# Patient Record
Sex: FEMALE | Race: WHITE | NOT HISPANIC OR LATINO | Employment: UNEMPLOYED | URBAN - METROPOLITAN AREA
[De-identification: names, ages, dates, MRNs, and addresses within clinical notes are randomized per-mention and may not be internally consistent; named-entity substitution may affect disease eponyms.]

---

## 2020-12-22 ENCOUNTER — APPOINTMENT (OUTPATIENT)
Dept: GENERAL RADIOLOGY | Facility: HOSPITAL | Age: 45
End: 2020-12-22

## 2020-12-22 ENCOUNTER — HOSPITAL ENCOUNTER (INPATIENT)
Facility: HOSPITAL | Age: 45
LOS: 7 days | Discharge: HOME | End: 2020-12-29
Attending: EMERGENCY MEDICINE | Admitting: HOSPITALIST

## 2020-12-22 DIAGNOSIS — F19.10 IV DRUG ABUSE (HCC): ICD-10-CM

## 2020-12-22 DIAGNOSIS — A41.9 SEVERE SEPSIS (HCC): Primary | ICD-10-CM

## 2020-12-22 DIAGNOSIS — R65.20 SEVERE SEPSIS (HCC): Primary | ICD-10-CM

## 2020-12-22 DIAGNOSIS — N39.0 URINARY TRACT INFECTION WITHOUT HEMATURIA, SITE UNSPECIFIED: ICD-10-CM

## 2020-12-22 DIAGNOSIS — E87.1 HYPONATREMIA: ICD-10-CM

## 2020-12-22 DIAGNOSIS — M00.9 SEPTIC ARTHRITIS OF AC JOINT (HCC): ICD-10-CM

## 2020-12-22 DIAGNOSIS — R73.9 HYPERGLYCEMIA: ICD-10-CM

## 2020-12-22 PROBLEM — F32.A DEPRESSION: Status: ACTIVE | Noted: 2020-12-22

## 2020-12-22 PROBLEM — M54.12 CERVICAL RADICULOPATHY: Status: ACTIVE | Noted: 2020-12-22

## 2020-12-22 PROBLEM — E11.9 TYPE 2 DIABETES MELLITUS WITHOUT COMPLICATIONS (HCC): Chronic | Status: ACTIVE | Noted: 2020-12-22

## 2020-12-22 PROBLEM — F41.9 ANXIETY: Status: ACTIVE | Noted: 2020-12-22

## 2020-12-22 PROBLEM — I10 HYPERTENSION: Status: ACTIVE | Noted: 2020-12-22

## 2020-12-22 PROBLEM — E11.9 TYPE 2 DIABETES MELLITUS WITHOUT COMPLICATIONS (HCC): Status: ACTIVE | Noted: 2020-12-22

## 2020-12-22 LAB
ALBUMIN SERPL-MCNC: 3.4 G/DL (ref 3.5–5.2)
ALBUMIN/GLOB SERPL: 0.8 G/DL
ALP SERPL-CCNC: 118 U/L (ref 39–117)
ALT SERPL W P-5'-P-CCNC: 21 U/L (ref 1–33)
AMPHET+METHAMPHET UR QL: POSITIVE
ANION GAP SERPL CALCULATED.3IONS-SCNC: 12 MMOL/L (ref 5–15)
AST SERPL-CCNC: 18 U/L (ref 1–32)
BACTERIA UR QL AUTO: ABNORMAL /HPF
BARBITURATES UR QL SCN: NEGATIVE
BASOPHILS # BLD AUTO: 0 10*3/MM3 (ref 0–0.2)
BASOPHILS NFR BLD AUTO: 0.2 % (ref 0–1.5)
BENZODIAZ UR QL SCN: NEGATIVE
BILIRUB SERPL-MCNC: 0.9 MG/DL (ref 0–1.2)
BILIRUB UR QL STRIP: NEGATIVE
BUN SERPL-MCNC: 17 MG/DL (ref 6–20)
BUN/CREAT SERPL: 18.7 (ref 7–25)
CALCIUM SPEC-SCNC: 9.1 MG/DL (ref 8.6–10.5)
CANNABINOIDS SERPL QL: NEGATIVE
CHLORIDE SERPL-SCNC: 89 MMOL/L (ref 98–107)
CLARITY UR: CLEAR
CO2 SERPL-SCNC: 23 MMOL/L (ref 22–29)
COCAINE UR QL: NEGATIVE
COLOR UR: YELLOW
CREAT SERPL-MCNC: 0.91 MG/DL (ref 0.57–1)
CRP SERPL-MCNC: 21.24 MG/DL (ref 0–0.5)
D-LACTATE SERPL-SCNC: 2.4 MMOL/L (ref 0.5–2)
D-LACTATE SERPL-SCNC: 2.5 MMOL/L (ref 0.5–2)
DEPRECATED RDW RBC AUTO: 55.6 FL (ref 37–54)
EOSINOPHIL # BLD AUTO: 0 10*3/MM3 (ref 0–0.4)
EOSINOPHIL NFR BLD AUTO: 0 % (ref 0.3–6.2)
ERYTHROCYTE [DISTWIDTH] IN BLOOD BY AUTOMATED COUNT: 17 % (ref 12.3–15.4)
ERYTHROCYTE [SEDIMENTATION RATE] IN BLOOD: 59 MM/HR (ref 0–20)
GFR SERPL CREATININE-BSD FRML MDRD: 67 ML/MIN/1.73
GLOBULIN UR ELPH-MCNC: 4.1 GM/DL
GLUCOSE SERPL-MCNC: 314 MG/DL (ref 65–99)
GLUCOSE UR STRIP-MCNC: ABNORMAL MG/DL
HCT VFR BLD AUTO: 47.5 % (ref 34–46.6)
HGB BLD-MCNC: 15.9 G/DL (ref 12–15.9)
HGB UR QL STRIP.AUTO: ABNORMAL
HOLD SPECIMEN: NORMAL
HOLD SPECIMEN: NORMAL
HYALINE CASTS UR QL AUTO: ABNORMAL /LPF
KETONES UR QL STRIP: NEGATIVE
LACTATE HOLD SPECIMEN: NORMAL
LEUKOCYTE ESTERASE UR QL STRIP.AUTO: NEGATIVE
LYMPHOCYTES # BLD AUTO: 1 10*3/MM3 (ref 0.7–3.1)
LYMPHOCYTES NFR BLD AUTO: 7.9 % (ref 19.6–45.3)
MCH RBC QN AUTO: 31.5 PG (ref 26.6–33)
MCHC RBC AUTO-ENTMCNC: 33.4 G/DL (ref 31.5–35.7)
MCV RBC AUTO: 94.3 FL (ref 79–97)
METHADONE UR QL SCN: NEGATIVE
MONOCYTES # BLD AUTO: 0.4 10*3/MM3 (ref 0.1–0.9)
MONOCYTES NFR BLD AUTO: 3.4 % (ref 5–12)
NEUTROPHILS NFR BLD AUTO: 11 10*3/MM3 (ref 1.7–7)
NEUTROPHILS NFR BLD AUTO: 88.5 % (ref 42.7–76)
NITRITE UR QL STRIP: NEGATIVE
NRBC BLD AUTO-RTO: 0.1 /100 WBC (ref 0–0.2)
OPIATES UR QL: POSITIVE
OXYCODONE UR QL SCN: NEGATIVE
PH UR STRIP.AUTO: 6 [PH] (ref 5–8)
PLATELET # BLD AUTO: 187 10*3/MM3 (ref 140–450)
PMV BLD AUTO: 7.5 FL (ref 6–12)
POTASSIUM SERPL-SCNC: 3.9 MMOL/L (ref 3.5–5.2)
PROCALCITONIN SERPL-MCNC: 2.81 NG/ML (ref 0–0.25)
PROT SERPL-MCNC: 7.5 G/DL (ref 6–8.5)
PROT UR QL STRIP: ABNORMAL
RBC # BLD AUTO: 5.03 10*6/MM3 (ref 3.77–5.28)
RBC # UR: ABNORMAL /HPF
REF LAB TEST METHOD: ABNORMAL
SARS-COV-2 RNA PNL SPEC NAA+PROBE: NORMAL
SODIUM SERPL-SCNC: 124 MMOL/L (ref 136–145)
SP GR UR STRIP: 1.03 (ref 1–1.03)
SQUAMOUS #/AREA URNS HPF: ABNORMAL /HPF
UROBILINOGEN UR QL STRIP: ABNORMAL
WBC # BLD AUTO: 12.4 10*3/MM3 (ref 3.4–10.8)
WBC UR QL AUTO: ABNORMAL /HPF
WHOLE BLOOD HOLD SPECIMEN: NORMAL
WHOLE BLOOD HOLD SPECIMEN: NORMAL

## 2020-12-22 PROCEDURE — 87147 CULTURE TYPE IMMUNOLOGIC: CPT | Performed by: EMERGENCY MEDICINE

## 2020-12-22 PROCEDURE — 80307 DRUG TEST PRSMV CHEM ANLYZR: CPT | Performed by: EMERGENCY MEDICINE

## 2020-12-22 PROCEDURE — 83036 HEMOGLOBIN GLYCOSYLATED A1C: CPT | Performed by: EMERGENCY MEDICINE

## 2020-12-22 PROCEDURE — 87086 URINE CULTURE/COLONY COUNT: CPT | Performed by: EMERGENCY MEDICINE

## 2020-12-22 PROCEDURE — 93005 ELECTROCARDIOGRAM TRACING: CPT | Performed by: EMERGENCY MEDICINE

## 2020-12-22 PROCEDURE — 83605 ASSAY OF LACTIC ACID: CPT

## 2020-12-22 PROCEDURE — 71045 X-RAY EXAM CHEST 1 VIEW: CPT

## 2020-12-22 PROCEDURE — 80053 COMPREHEN METABOLIC PANEL: CPT | Performed by: EMERGENCY MEDICINE

## 2020-12-22 PROCEDURE — 25010000002 CEFTRIAXONE PER 250 MG: Performed by: EMERGENCY MEDICINE

## 2020-12-22 PROCEDURE — 25010000002 KETOROLAC TROMETHAMINE PER 15 MG: Performed by: EMERGENCY MEDICINE

## 2020-12-22 PROCEDURE — 87150 DNA/RNA AMPLIFIED PROBE: CPT | Performed by: EMERGENCY MEDICINE

## 2020-12-22 PROCEDURE — 84145 PROCALCITONIN (PCT): CPT | Performed by: EMERGENCY MEDICINE

## 2020-12-22 PROCEDURE — 85651 RBC SED RATE NONAUTOMATED: CPT | Performed by: EMERGENCY MEDICINE

## 2020-12-22 PROCEDURE — 87040 BLOOD CULTURE FOR BACTERIA: CPT | Performed by: EMERGENCY MEDICINE

## 2020-12-22 PROCEDURE — 87186 SC STD MICRODIL/AGAR DIL: CPT | Performed by: EMERGENCY MEDICINE

## 2020-12-22 PROCEDURE — 99222 1ST HOSP IP/OBS MODERATE 55: CPT | Performed by: STUDENT IN AN ORGANIZED HEALTH CARE EDUCATION/TRAINING PROGRAM

## 2020-12-22 PROCEDURE — 99284 EMERGENCY DEPT VISIT MOD MDM: CPT

## 2020-12-22 PROCEDURE — 25010000002 ENOXAPARIN PER 10 MG: Performed by: STUDENT IN AN ORGANIZED HEALTH CARE EDUCATION/TRAINING PROGRAM

## 2020-12-22 PROCEDURE — 73030 X-RAY EXAM OF SHOULDER: CPT

## 2020-12-22 PROCEDURE — 86140 C-REACTIVE PROTEIN: CPT | Performed by: EMERGENCY MEDICINE

## 2020-12-22 PROCEDURE — 25010000002 VANCOMYCIN 10 G RECONSTITUTED SOLUTION: Performed by: EMERGENCY MEDICINE

## 2020-12-22 PROCEDURE — 85025 COMPLETE CBC W/AUTO DIFF WBC: CPT | Performed by: EMERGENCY MEDICINE

## 2020-12-22 PROCEDURE — 81001 URINALYSIS AUTO W/SCOPE: CPT | Performed by: EMERGENCY MEDICINE

## 2020-12-22 PROCEDURE — 87635 SARS-COV-2 COVID-19 AMP PRB: CPT | Performed by: EMERGENCY MEDICINE

## 2020-12-22 RX ORDER — SODIUM CHLORIDE 0.9 % (FLUSH) 0.9 %
10 SYRINGE (ML) INJECTION EVERY 12 HOURS SCHEDULED
Status: DISCONTINUED | OUTPATIENT
Start: 2020-12-22 | End: 2020-12-29 | Stop reason: HOSPADM

## 2020-12-22 RX ORDER — POTASSIUM CHLORIDE 1.5 G/1.77G
40 POWDER, FOR SOLUTION ORAL AS NEEDED
Status: DISCONTINUED | OUTPATIENT
Start: 2020-12-22 | End: 2020-12-29 | Stop reason: HOSPADM

## 2020-12-22 RX ORDER — NICOTINE POLACRILEX 4 MG
15 LOZENGE BUCCAL
Status: DISCONTINUED | OUTPATIENT
Start: 2020-12-22 | End: 2020-12-29 | Stop reason: HOSPADM

## 2020-12-22 RX ORDER — NITROGLYCERIN 0.4 MG/1
0.4 TABLET SUBLINGUAL
Status: DISCONTINUED | OUTPATIENT
Start: 2020-12-22 | End: 2020-12-29 | Stop reason: HOSPADM

## 2020-12-22 RX ORDER — SODIUM CHLORIDE, SODIUM LACTATE, POTASSIUM CHLORIDE, CALCIUM CHLORIDE 600; 310; 30; 20 MG/100ML; MG/100ML; MG/100ML; MG/100ML
125 INJECTION, SOLUTION INTRAVENOUS CONTINUOUS
Status: DISCONTINUED | OUTPATIENT
Start: 2020-12-22 | End: 2020-12-22

## 2020-12-22 RX ORDER — INSULIN LISPRO 100 [IU]/ML
0-9 INJECTION, SOLUTION INTRAVENOUS; SUBCUTANEOUS AS NEEDED
Status: DISCONTINUED | OUTPATIENT
Start: 2020-12-22 | End: 2020-12-29 | Stop reason: HOSPADM

## 2020-12-22 RX ORDER — ACETAMINOPHEN 325 MG/1
650 TABLET ORAL EVERY 4 HOURS PRN
Status: DISCONTINUED | OUTPATIENT
Start: 2020-12-22 | End: 2020-12-29 | Stop reason: HOSPADM

## 2020-12-22 RX ORDER — VANCOMYCIN 1.75 GRAM/500 ML IN 0.9 % SODIUM CHLORIDE INTRAVENOUS
20 ONCE
Status: COMPLETED | OUTPATIENT
Start: 2020-12-22 | End: 2020-12-22

## 2020-12-22 RX ORDER — INSULIN LISPRO 100 [IU]/ML
0-9 INJECTION, SOLUTION INTRAVENOUS; SUBCUTANEOUS
Status: DISCONTINUED | OUTPATIENT
Start: 2020-12-23 | End: 2020-12-29 | Stop reason: HOSPADM

## 2020-12-22 RX ORDER — ONDANSETRON 2 MG/ML
4 INJECTION INTRAMUSCULAR; INTRAVENOUS EVERY 6 HOURS PRN
Status: DISCONTINUED | OUTPATIENT
Start: 2020-12-22 | End: 2020-12-29 | Stop reason: HOSPADM

## 2020-12-22 RX ORDER — KETOROLAC TROMETHAMINE 15 MG/ML
15 INJECTION, SOLUTION INTRAMUSCULAR; INTRAVENOUS ONCE
Status: COMPLETED | OUTPATIENT
Start: 2020-12-22 | End: 2020-12-22

## 2020-12-22 RX ORDER — ACETAMINOPHEN 160 MG/5ML
650 SOLUTION ORAL EVERY 4 HOURS PRN
Status: DISCONTINUED | OUTPATIENT
Start: 2020-12-22 | End: 2020-12-29 | Stop reason: HOSPADM

## 2020-12-22 RX ORDER — POTASSIUM CHLORIDE 7.45 MG/ML
10 INJECTION INTRAVENOUS
Status: DISCONTINUED | OUTPATIENT
Start: 2020-12-22 | End: 2020-12-29 | Stop reason: HOSPADM

## 2020-12-22 RX ORDER — ACETAMINOPHEN 650 MG/1
650 SUPPOSITORY RECTAL EVERY 4 HOURS PRN
Status: DISCONTINUED | OUTPATIENT
Start: 2020-12-22 | End: 2020-12-29 | Stop reason: HOSPADM

## 2020-12-22 RX ORDER — POTASSIUM CHLORIDE 20 MEQ/1
40 TABLET, EXTENDED RELEASE ORAL AS NEEDED
Status: DISCONTINUED | OUTPATIENT
Start: 2020-12-22 | End: 2020-12-29 | Stop reason: HOSPADM

## 2020-12-22 RX ORDER — MAGNESIUM SULFATE HEPTAHYDRATE 40 MG/ML
2 INJECTION, SOLUTION INTRAVENOUS AS NEEDED
Status: DISCONTINUED | OUTPATIENT
Start: 2020-12-22 | End: 2020-12-29 | Stop reason: HOSPADM

## 2020-12-22 RX ORDER — SODIUM CHLORIDE 0.9 % (FLUSH) 0.9 %
10 SYRINGE (ML) INJECTION AS NEEDED
Status: DISCONTINUED | OUTPATIENT
Start: 2020-12-22 | End: 2020-12-29 | Stop reason: HOSPADM

## 2020-12-22 RX ORDER — DEXTROSE MONOHYDRATE 25 G/50ML
25 INJECTION, SOLUTION INTRAVENOUS
Status: DISCONTINUED | OUTPATIENT
Start: 2020-12-22 | End: 2020-12-29 | Stop reason: HOSPADM

## 2020-12-22 RX ORDER — MAGNESIUM SULFATE 1 G/100ML
1 INJECTION INTRAVENOUS AS NEEDED
Status: DISCONTINUED | OUTPATIENT
Start: 2020-12-22 | End: 2020-12-29 | Stop reason: HOSPADM

## 2020-12-22 RX ORDER — ONDANSETRON 4 MG/1
4 TABLET, FILM COATED ORAL EVERY 6 HOURS PRN
Status: DISCONTINUED | OUTPATIENT
Start: 2020-12-22 | End: 2020-12-29 | Stop reason: HOSPADM

## 2020-12-22 RX ORDER — SODIUM CHLORIDE 9 MG/ML
75 INJECTION, SOLUTION INTRAVENOUS CONTINUOUS
Status: DISCONTINUED | OUTPATIENT
Start: 2020-12-22 | End: 2020-12-27

## 2020-12-22 RX ORDER — CHOLECALCIFEROL (VITAMIN D3) 125 MCG
5 CAPSULE ORAL NIGHTLY PRN
Status: DISCONTINUED | OUTPATIENT
Start: 2020-12-22 | End: 2020-12-29 | Stop reason: HOSPADM

## 2020-12-22 RX ADMIN — VANCOMYCIN HYDROCHLORIDE 1750 MG: 10 INJECTION, POWDER, LYOPHILIZED, FOR SOLUTION INTRAVENOUS at 20:27

## 2020-12-22 RX ADMIN — KETOROLAC TROMETHAMINE 15 MG: 15 INJECTION, SOLUTION INTRAMUSCULAR; INTRAVENOUS at 19:34

## 2020-12-22 RX ADMIN — SODIUM CHLORIDE, SODIUM LACTATE, POTASSIUM CHLORIDE, AND CALCIUM CHLORIDE 1000 ML: .6; .31; .03; .02 INJECTION, SOLUTION INTRAVENOUS at 20:20

## 2020-12-22 RX ADMIN — SODIUM CHLORIDE 100 ML/HR: 9 INJECTION, SOLUTION INTRAVENOUS at 20:26

## 2020-12-22 RX ADMIN — ACETAMINOPHEN 650 MG: 325 TABLET, FILM COATED ORAL at 23:27

## 2020-12-22 RX ADMIN — Medication 10 ML: at 23:26

## 2020-12-22 RX ADMIN — CEFTRIAXONE SODIUM 1 G: 10 INJECTION, POWDER, FOR SOLUTION INTRAVENOUS at 19:45

## 2020-12-22 RX ADMIN — SODIUM CHLORIDE, POTASSIUM CHLORIDE, SODIUM LACTATE AND CALCIUM CHLORIDE 125 ML/HR: 600; 310; 30; 20 INJECTION, SOLUTION INTRAVENOUS at 19:34

## 2020-12-22 RX ADMIN — ENOXAPARIN SODIUM 40 MG: 40 INJECTION SUBCUTANEOUS at 23:27

## 2020-12-23 ENCOUNTER — APPOINTMENT (OUTPATIENT)
Dept: CARDIOLOGY | Facility: HOSPITAL | Age: 45
End: 2020-12-23

## 2020-12-23 LAB
ALBUMIN SERPL-MCNC: 2.9 G/DL (ref 3.5–5.2)
ALBUMIN/GLOB SERPL: 0.9 G/DL
ALP SERPL-CCNC: 100 U/L (ref 39–117)
ALT SERPL W P-5'-P-CCNC: 18 U/L (ref 1–33)
ANION GAP SERPL CALCULATED.3IONS-SCNC: 10 MMOL/L (ref 5–15)
AST SERPL-CCNC: 23 U/L (ref 1–32)
B PARAPERT DNA SPEC QL NAA+PROBE: NOT DETECTED
B PERT DNA SPEC QL NAA+PROBE: NOT DETECTED
BACTERIA BLD CULT: ABNORMAL
BASOPHILS # BLD AUTO: 0 10*3/MM3 (ref 0–0.2)
BASOPHILS NFR BLD AUTO: 0.3 % (ref 0–1.5)
BH CV ECHO MEAS - ACS: 1.8 CM
BH CV ECHO MEAS - AO MAX PG (FULL): 1.2 MMHG
BH CV ECHO MEAS - AO MAX PG: 4.1 MMHG
BH CV ECHO MEAS - AO MEAN PG (FULL): 1 MMHG
BH CV ECHO MEAS - AO MEAN PG: 3.1 MMHG
BH CV ECHO MEAS - AO ROOT AREA (BSA CORRECTED): 1.3
BH CV ECHO MEAS - AO ROOT AREA: 4.9 CM^2
BH CV ECHO MEAS - AO ROOT DIAM: 2.5 CM
BH CV ECHO MEAS - AO V2 MAX: 101.5 CM/SEC
BH CV ECHO MEAS - AO V2 MEAN: 86.3 CM/SEC
BH CV ECHO MEAS - AO V2 VTI: 16.4 CM
BH CV ECHO MEAS - AORTIC HR: 122.2 BPM
BH CV ECHO MEAS - AORTIC R-R: 0.49 SEC
BH CV ECHO MEAS - ASC AORTA: 2.6 CM
BH CV ECHO MEAS - AVA(I,A): 3.5 CM^2
BH CV ECHO MEAS - AVA(I,D): 3.5 CM^2
BH CV ECHO MEAS - AVA(V,A): 3.4 CM^2
BH CV ECHO MEAS - AVA(V,D): 3.4 CM^2
BH CV ECHO MEAS - BSA(HAYCOCK): 2 M^2
BH CV ECHO MEAS - BSA: 1.9 M^2
BH CV ECHO MEAS - BZI_BMI: 33.8 KILOGRAMS/M^2
BH CV ECHO MEAS - BZI_METRIC_HEIGHT: 162.6 CM
BH CV ECHO MEAS - BZI_METRIC_WEIGHT: 89.4 KG
BH CV ECHO MEAS - CI(AO): 5 L/MIN/M^2
BH CV ECHO MEAS - CI(LVOT): 3.6 L/MIN/M^2
BH CV ECHO MEAS - CO(AO): 9.8 L/MIN
BH CV ECHO MEAS - CO(LVOT): 7 L/MIN
BH CV ECHO MEAS - EDV(CUBED): 22.8 ML
BH CV ECHO MEAS - EDV(MOD-SP2): 35.5 ML
BH CV ECHO MEAS - EDV(MOD-SP4): 24.7 ML
BH CV ECHO MEAS - EDV(TEICH): 30.5 ML
BH CV ECHO MEAS - EF(CUBED): 51.1 %
BH CV ECHO MEAS - EF(MOD-BP): 63 %
BH CV ECHO MEAS - EF(MOD-SP2): 65.5 %
BH CV ECHO MEAS - EF(MOD-SP4): 62 %
BH CV ECHO MEAS - EF(TEICH): 44.8 %
BH CV ECHO MEAS - ESV(CUBED): 11.1 ML
BH CV ECHO MEAS - ESV(MOD-SP2): 12.2 ML
BH CV ECHO MEAS - ESV(MOD-SP4): 9.4 ML
BH CV ECHO MEAS - ESV(TEICH): 16.8 ML
BH CV ECHO MEAS - FS: 21.2 %
BH CV ECHO MEAS - IVS/LVPW: 1.1
BH CV ECHO MEAS - IVSD: 1.3 CM
BH CV ECHO MEAS - LA DIMENSION(2D): 2.9 CM
BH CV ECHO MEAS - LV DIASTOLIC VOL/BSA (35-75): 12.7 ML/M^2
BH CV ECHO MEAS - LV MASS(C)D: 103.9 GRAMS
BH CV ECHO MEAS - LV MASS(C)DI: 53.5 GRAMS/M^2
BH CV ECHO MEAS - LV MAX PG: 2.9 MMHG
BH CV ECHO MEAS - LV MEAN PG: 2.1 MMHG
BH CV ECHO MEAS - LV SYSTOLIC VOL/BSA (12-30): 4.8 ML/M^2
BH CV ECHO MEAS - LV V1 MAX: 85.8 CM/SEC
BH CV ECHO MEAS - LV V1 MEAN: 70.3 CM/SEC
BH CV ECHO MEAS - LV V1 VTI: 14.5 CM
BH CV ECHO MEAS - LVIDD: 2.8 CM
BH CV ECHO MEAS - LVIDS: 2.2 CM
BH CV ECHO MEAS - LVOT AREA: 4 CM^2
BH CV ECHO MEAS - LVOT DIAM: 2.3 CM
BH CV ECHO MEAS - LVPWD: 1.2 CM
BH CV ECHO MEAS - MR MAX PG: 63.9 MMHG
BH CV ECHO MEAS - MR MAX VEL: 399.6 CM/SEC
BH CV ECHO MEAS - MV A MAX VEL: 78.5 CM/SEC
BH CV ECHO MEAS - MV DEC SLOPE: 310.5 CM/SEC^2
BH CV ECHO MEAS - MV DEC TIME: 0.24 SEC
BH CV ECHO MEAS - MV E MAX VEL: 73.8 CM/SEC
BH CV ECHO MEAS - MV E/A: 0.94
BH CV ECHO MEAS - MV MAX PG: 2.6 MMHG
BH CV ECHO MEAS - MV MEAN PG: 1.4 MMHG
BH CV ECHO MEAS - MV V2 MAX: 80.4 CM/SEC
BH CV ECHO MEAS - MV V2 MEAN: 57.5 CM/SEC
BH CV ECHO MEAS - MV V2 VTI: 14 CM
BH CV ECHO MEAS - MVA(VTI): 4.1 CM^2
BH CV ECHO MEAS - PA ACC TIME: 0.09 SEC
BH CV ECHO MEAS - PA MAX PG (FULL): 2.7 MMHG
BH CV ECHO MEAS - PA MAX PG: 3.9 MMHG
BH CV ECHO MEAS - PA MEAN PG (FULL): 1.7 MMHG
BH CV ECHO MEAS - PA MEAN PG: 2.4 MMHG
BH CV ECHO MEAS - PA PR(ACCEL): 37.9 MMHG
BH CV ECHO MEAS - PA V2 MAX: 98.5 CM/SEC
BH CV ECHO MEAS - PA V2 MEAN: 76.3 CM/SEC
BH CV ECHO MEAS - PA V2 VTI: 13.4 CM
BH CV ECHO MEAS - PI END-D VEL: 139.1 CM/SEC
BH CV ECHO MEAS - PI MAX PG: 20 MMHG
BH CV ECHO MEAS - PI MAX VEL: 223.7 CM/SEC
BH CV ECHO MEAS - PVA(I,A): 5.7 CM^2
BH CV ECHO MEAS - PVA(I,D): 5.7 CM^2
BH CV ECHO MEAS - PVA(V,A): 4.7 CM^2
BH CV ECHO MEAS - PVA(V,D): 4.7 CM^2
BH CV ECHO MEAS - QP/QS: 1.3
BH CV ECHO MEAS - RAP SYSTOLE: 3 MMHG
BH CV ECHO MEAS - RV MAX PG: 1.2 MMHG
BH CV ECHO MEAS - RV MEAN PG: 0.74 MMHG
BH CV ECHO MEAS - RV V1 MAX: 54.3 CM/SEC
BH CV ECHO MEAS - RV V1 MEAN: 41.2 CM/SEC
BH CV ECHO MEAS - RV V1 VTI: 8.9 CM
BH CV ECHO MEAS - RVDD: 3.6 CM
BH CV ECHO MEAS - RVOT AREA: 8.5 CM^2
BH CV ECHO MEAS - RVOT DIAM: 3.3 CM
BH CV ECHO MEAS - RVSP: 49.1 MMHG
BH CV ECHO MEAS - SI(AO): 41.2 ML/M^2
BH CV ECHO MEAS - SI(CUBED): 6 ML/M^2
BH CV ECHO MEAS - SI(LVOT): 29.7 ML/M^2
BH CV ECHO MEAS - SI(MOD-SP2): 12 ML/M^2
BH CV ECHO MEAS - SI(MOD-SP4): 7.9 ML/M^2
BH CV ECHO MEAS - SI(TEICH): 7 ML/M^2
BH CV ECHO MEAS - SV(AO): 80 ML
BH CV ECHO MEAS - SV(CUBED): 11.6 ML
BH CV ECHO MEAS - SV(LVOT): 57.6 ML
BH CV ECHO MEAS - SV(MOD-SP2): 23.2 ML
BH CV ECHO MEAS - SV(MOD-SP4): 15.3 ML
BH CV ECHO MEAS - SV(RVOT): 75.8 ML
BH CV ECHO MEAS - SV(TEICH): 13.6 ML
BH CV ECHO MEAS - TR MAX VEL: 339 CM/SEC
BILIRUB SERPL-MCNC: 0.5 MG/DL (ref 0–1.2)
BOTTLE TYPE: ABNORMAL
BUN SERPL-MCNC: 17 MG/DL (ref 6–20)
BUN/CREAT SERPL: 21.3 (ref 7–25)
C PNEUM DNA NPH QL NAA+NON-PROBE: NOT DETECTED
CALCIUM SPEC-SCNC: 8.2 MG/DL (ref 8.6–10.5)
CHLORIDE SERPL-SCNC: 96 MMOL/L (ref 98–107)
CO2 SERPL-SCNC: 24 MMOL/L (ref 22–29)
CREAT SERPL-MCNC: 0.8 MG/DL (ref 0.57–1)
DEPRECATED RDW RBC AUTO: 55.6 FL (ref 37–54)
EOSINOPHIL # BLD AUTO: 0 10*3/MM3 (ref 0–0.4)
EOSINOPHIL NFR BLD AUTO: 0.1 % (ref 0.3–6.2)
ERYTHROCYTE [DISTWIDTH] IN BLOOD BY AUTOMATED COUNT: 16.8 % (ref 12.3–15.4)
FLUAV SUBTYP SPEC NAA+PROBE: NOT DETECTED
FLUBV RNA ISLT QL NAA+PROBE: NOT DETECTED
GFR SERPL CREATININE-BSD FRML MDRD: 78 ML/MIN/1.73
GLOBULIN UR ELPH-MCNC: 3.3 GM/DL
GLUCOSE BLDC GLUCOMTR-MCNC: 222 MG/DL (ref 70–105)
GLUCOSE BLDC GLUCOMTR-MCNC: 254 MG/DL (ref 70–105)
GLUCOSE BLDC GLUCOMTR-MCNC: 282 MG/DL (ref 70–105)
GLUCOSE BLDC GLUCOMTR-MCNC: 375 MG/DL (ref 70–105)
GLUCOSE SERPL-MCNC: 255 MG/DL (ref 65–99)
HADV DNA SPEC NAA+PROBE: NOT DETECTED
HAV IGM SERPL QL IA: NORMAL
HBA1C MFR BLD: 6.4 % (ref 3.5–5.6)
HBV CORE IGM SERPL QL IA: NORMAL
HBV SURFACE AG SERPL QL IA: NORMAL
HCOV 229E RNA SPEC QL NAA+PROBE: NOT DETECTED
HCOV HKU1 RNA SPEC QL NAA+PROBE: NOT DETECTED
HCOV NL63 RNA SPEC QL NAA+PROBE: NOT DETECTED
HCOV OC43 RNA SPEC QL NAA+PROBE: NOT DETECTED
HCT VFR BLD AUTO: 43.1 % (ref 34–46.6)
HCV AB SER DONR QL: NORMAL
HGB BLD-MCNC: 14.1 G/DL (ref 12–15.9)
HIV1+2 AB SER QL: NORMAL
HMPV RNA NPH QL NAA+NON-PROBE: NOT DETECTED
HPIV1 RNA SPEC QL NAA+PROBE: NOT DETECTED
HPIV2 RNA SPEC QL NAA+PROBE: NOT DETECTED
HPIV3 RNA NPH QL NAA+PROBE: NOT DETECTED
HPIV4 P GENE NPH QL NAA+PROBE: NOT DETECTED
LV EF 2D ECHO EST: 65 %
LYMPHOCYTES # BLD AUTO: 2 10*3/MM3 (ref 0.7–3.1)
LYMPHOCYTES NFR BLD AUTO: 19.5 % (ref 19.6–45.3)
M PNEUMO IGG SER IA-ACNC: NOT DETECTED
MAGNESIUM SERPL-MCNC: 2.1 MG/DL (ref 1.6–2.6)
MCH RBC QN AUTO: 31 PG (ref 26.6–33)
MCHC RBC AUTO-ENTMCNC: 32.7 G/DL (ref 31.5–35.7)
MCV RBC AUTO: 94.7 FL (ref 79–97)
MONOCYTES # BLD AUTO: 1 10*3/MM3 (ref 0.1–0.9)
MONOCYTES NFR BLD AUTO: 10.2 % (ref 5–12)
NEUTROPHILS NFR BLD AUTO: 69.9 % (ref 42.7–76)
NEUTROPHILS NFR BLD AUTO: 7.1 10*3/MM3 (ref 1.7–7)
NRBC BLD AUTO-RTO: 0.1 /100 WBC (ref 0–0.2)
PLATELET # BLD AUTO: 148 10*3/MM3 (ref 140–450)
PMV BLD AUTO: 7.1 FL (ref 6–12)
POTASSIUM SERPL-SCNC: 4.1 MMOL/L (ref 3.5–5.2)
PROT SERPL-MCNC: 6.2 G/DL (ref 6–8.5)
QT INTERVAL: 309 MS
RBC # BLD AUTO: 4.55 10*6/MM3 (ref 3.77–5.28)
RHINOVIRUS RNA SPEC NAA+PROBE: NOT DETECTED
RSV RNA NPH QL NAA+NON-PROBE: NOT DETECTED
SODIUM SERPL-SCNC: 130 MMOL/L (ref 136–145)
TSH SERPL DL<=0.05 MIU/L-ACNC: 3.73 UIU/ML (ref 0.27–4.2)
WBC # BLD AUTO: 10.2 10*3/MM3 (ref 3.4–10.8)

## 2020-12-23 PROCEDURE — 63710000001 INSULIN LISPRO (HUMAN) PER 5 UNITS: Performed by: STUDENT IN AN ORGANIZED HEALTH CARE EDUCATION/TRAINING PROGRAM

## 2020-12-23 PROCEDURE — 99222 1ST HOSP IP/OBS MODERATE 55: CPT | Performed by: INTERNAL MEDICINE

## 2020-12-23 PROCEDURE — 99232 SBSQ HOSP IP/OBS MODERATE 35: CPT | Performed by: HOSPITALIST

## 2020-12-23 PROCEDURE — 85025 COMPLETE CBC W/AUTO DIFF WBC: CPT | Performed by: STUDENT IN AN ORGANIZED HEALTH CARE EDUCATION/TRAINING PROGRAM

## 2020-12-23 PROCEDURE — 87040 BLOOD CULTURE FOR BACTERIA: CPT | Performed by: INTERNAL MEDICINE

## 2020-12-23 PROCEDURE — 25010000002 VANCOMYCIN 10 G RECONSTITUTED SOLUTION: Performed by: STUDENT IN AN ORGANIZED HEALTH CARE EDUCATION/TRAINING PROGRAM

## 2020-12-23 PROCEDURE — 83735 ASSAY OF MAGNESIUM: CPT | Performed by: STUDENT IN AN ORGANIZED HEALTH CARE EDUCATION/TRAINING PROGRAM

## 2020-12-23 PROCEDURE — 0100U HC BIOFIRE FILMARRAY RESP PANEL 2: CPT | Performed by: INTERNAL MEDICINE

## 2020-12-23 PROCEDURE — 93306 TTE W/DOPPLER COMPLETE: CPT | Performed by: INTERNAL MEDICINE

## 2020-12-23 PROCEDURE — G0432 EIA HIV-1/HIV-2 SCREEN: HCPCS | Performed by: INTERNAL MEDICINE

## 2020-12-23 PROCEDURE — 93306 TTE W/DOPPLER COMPLETE: CPT

## 2020-12-23 PROCEDURE — 80053 COMPREHEN METABOLIC PANEL: CPT | Performed by: STUDENT IN AN ORGANIZED HEALTH CARE EDUCATION/TRAINING PROGRAM

## 2020-12-23 PROCEDURE — 84443 ASSAY THYROID STIM HORMONE: CPT | Performed by: STUDENT IN AN ORGANIZED HEALTH CARE EDUCATION/TRAINING PROGRAM

## 2020-12-23 PROCEDURE — 82962 GLUCOSE BLOOD TEST: CPT

## 2020-12-23 PROCEDURE — 25010000002 CEFTRIAXONE PER 250 MG: Performed by: INTERNAL MEDICINE

## 2020-12-23 PROCEDURE — 25010000002 ENOXAPARIN PER 10 MG: Performed by: STUDENT IN AN ORGANIZED HEALTH CARE EDUCATION/TRAINING PROGRAM

## 2020-12-23 PROCEDURE — 80074 ACUTE HEPATITIS PANEL: CPT | Performed by: INTERNAL MEDICINE

## 2020-12-23 RX ORDER — OXYCODONE HYDROCHLORIDE 5 MG/1
5 TABLET ORAL EVERY 4 HOURS PRN
Status: DISCONTINUED | OUTPATIENT
Start: 2020-12-23 | End: 2020-12-29 | Stop reason: HOSPADM

## 2020-12-23 RX ADMIN — SODIUM CHLORIDE 1250 MG: 9 INJECTION, SOLUTION INTRAVENOUS at 20:17

## 2020-12-23 RX ADMIN — KETAMINE HYDROCHLORIDE 27 MG: 50 INJECTION, SOLUTION INTRAMUSCULAR; INTRAVENOUS at 23:19

## 2020-12-23 RX ADMIN — CEFTRIAXONE SODIUM 2 G: 2 INJECTION, POWDER, FOR SOLUTION INTRAMUSCULAR; INTRAVENOUS at 17:08

## 2020-12-23 RX ADMIN — OXYCODONE 5 MG: 5 TABLET ORAL at 17:09

## 2020-12-23 RX ADMIN — Medication 10 ML: at 09:25

## 2020-12-23 RX ADMIN — SODIUM CHLORIDE 1250 MG: 9 INJECTION, SOLUTION INTRAVENOUS at 09:25

## 2020-12-23 RX ADMIN — INSULIN LISPRO 6 UNITS: 100 INJECTION, SOLUTION INTRAVENOUS; SUBCUTANEOUS at 17:12

## 2020-12-23 RX ADMIN — ENOXAPARIN SODIUM 40 MG: 40 INJECTION SUBCUTANEOUS at 17:08

## 2020-12-23 RX ADMIN — INSULIN LISPRO 5 UNITS: 100 INJECTION, SOLUTION INTRAVENOUS; SUBCUTANEOUS at 09:25

## 2020-12-23 RX ADMIN — Medication 10 ML: at 20:17

## 2020-12-23 RX ADMIN — KETAMINE HYDROCHLORIDE 27 MG: 50 INJECTION, SOLUTION INTRAMUSCULAR; INTRAVENOUS at 17:29

## 2020-12-23 RX ADMIN — INSULIN LISPRO 7 UNITS: 100 INJECTION, SOLUTION INTRAVENOUS; SUBCUTANEOUS at 11:21

## 2020-12-23 RX ADMIN — ACETAMINOPHEN 650 MG: 325 TABLET, FILM COATED ORAL at 09:26

## 2020-12-24 ENCOUNTER — ANESTHESIA EVENT (OUTPATIENT)
Dept: PERIOP | Facility: HOSPITAL | Age: 45
End: 2020-12-24

## 2020-12-24 ENCOUNTER — APPOINTMENT (OUTPATIENT)
Dept: INTERVENTIONAL RADIOLOGY/VASCULAR | Facility: HOSPITAL | Age: 45
End: 2020-12-24

## 2020-12-24 ENCOUNTER — ANESTHESIA (OUTPATIENT)
Dept: PERIOP | Facility: HOSPITAL | Age: 45
End: 2020-12-24

## 2020-12-24 ENCOUNTER — APPOINTMENT (OUTPATIENT)
Dept: MRI IMAGING | Facility: HOSPITAL | Age: 45
End: 2020-12-24

## 2020-12-24 PROBLEM — M00.9: Status: ACTIVE | Noted: 2020-12-22

## 2020-12-24 LAB
ALBUMIN SERPL-MCNC: 2.7 G/DL (ref 3.5–5.2)
ALBUMIN/GLOB SERPL: 0.9 G/DL
ALP SERPL-CCNC: 117 U/L (ref 39–117)
ALT SERPL W P-5'-P-CCNC: 53 U/L (ref 1–33)
ANION GAP SERPL CALCULATED.3IONS-SCNC: 7 MMOL/L (ref 5–15)
AST SERPL-CCNC: 83 U/L (ref 1–32)
B-HCG UR QL: NEGATIVE
BACTERIA SPEC AEROBE CULT: ABNORMAL
BASOPHILS # BLD AUTO: 0 10*3/MM3 (ref 0–0.2)
BASOPHILS NFR BLD AUTO: 0.5 % (ref 0–1.5)
BILIRUB SERPL-MCNC: 0.6 MG/DL (ref 0–1.2)
BUN SERPL-MCNC: 14 MG/DL (ref 6–20)
BUN/CREAT SERPL: 19.7 (ref 7–25)
CALCIUM SPEC-SCNC: 8.2 MG/DL (ref 8.6–10.5)
CHLORIDE SERPL-SCNC: 94 MMOL/L (ref 98–107)
CK SERPL-CCNC: 106 U/L (ref 20–180)
CO2 SERPL-SCNC: 22 MMOL/L (ref 22–29)
CREAT SERPL-MCNC: 0.71 MG/DL (ref 0.57–1)
CRYSTALS FLD MICRO: NORMAL
DEPRECATED RDW RBC AUTO: 56 FL (ref 37–54)
EOSINOPHIL # BLD AUTO: 0 10*3/MM3 (ref 0–0.4)
EOSINOPHIL NFR BLD AUTO: 0 % (ref 0.3–6.2)
ERYTHROCYTE [DISTWIDTH] IN BLOOD BY AUTOMATED COUNT: 16.9 % (ref 12.3–15.4)
ERYTHROCYTE [SEDIMENTATION RATE] IN BLOOD: 40 MM/HR (ref 0–20)
GFR SERPL CREATININE-BSD FRML MDRD: 89 ML/MIN/1.73
GLOBULIN UR ELPH-MCNC: 3.1 GM/DL
GLUCOSE BLDC GLUCOMTR-MCNC: 199 MG/DL (ref 70–105)
GLUCOSE BLDC GLUCOMTR-MCNC: 217 MG/DL (ref 70–105)
GLUCOSE BLDC GLUCOMTR-MCNC: 240 MG/DL (ref 70–105)
GLUCOSE BLDC GLUCOMTR-MCNC: 274 MG/DL (ref 70–105)
GLUCOSE SERPL-MCNC: 200 MG/DL (ref 65–99)
HCT VFR BLD AUTO: 36.3 % (ref 34–46.6)
HGB BLD-MCNC: 12 G/DL (ref 12–15.9)
LYMPHOCYTES # BLD AUTO: 1.8 10*3/MM3 (ref 0.7–3.1)
LYMPHOCYTES NFR BLD AUTO: 21.8 % (ref 19.6–45.3)
MAGNESIUM SERPL-MCNC: 2 MG/DL (ref 1.6–2.6)
MCH RBC QN AUTO: 31.2 PG (ref 26.6–33)
MCHC RBC AUTO-ENTMCNC: 33 G/DL (ref 31.5–35.7)
MCV RBC AUTO: 94.5 FL (ref 79–97)
MONOCYTES # BLD AUTO: 0.6 10*3/MM3 (ref 0.1–0.9)
MONOCYTES NFR BLD AUTO: 7 % (ref 5–12)
NEUTROPHILS NFR BLD AUTO: 5.9 10*3/MM3 (ref 1.7–7)
NEUTROPHILS NFR BLD AUTO: 70.7 % (ref 42.7–76)
NRBC BLD AUTO-RTO: 0.2 /100 WBC (ref 0–0.2)
OSMOLALITY UR: 651 MOSM/KG (ref 300–800)
PLATELET # BLD AUTO: 137 10*3/MM3 (ref 140–450)
PMV BLD AUTO: 8.5 FL (ref 6–12)
POTASSIUM SERPL-SCNC: 4 MMOL/L (ref 3.5–5.2)
PROT SERPL-MCNC: 5.8 G/DL (ref 6–8.5)
RBC # BLD AUTO: 3.84 10*6/MM3 (ref 3.77–5.28)
SODIUM SERPL-SCNC: 123 MMOL/L (ref 136–145)
SODIUM UR-SCNC: <20 MMOL/L
VANCOMYCIN PEAK SERPL-MCNC: 16.1 MCG/ML (ref 20–40)
VANCOMYCIN TROUGH SERPL-MCNC: 7.5 MCG/ML (ref 5–20)
WBC # BLD AUTO: 8.4 10*3/MM3 (ref 3.4–10.8)

## 2020-12-24 PROCEDURE — 25010000002 PROPOFOL 10 MG/ML EMULSION: Performed by: ANESTHESIOLOGY

## 2020-12-24 PROCEDURE — 81025 URINE PREGNANCY TEST: CPT | Performed by: INTERNAL MEDICINE

## 2020-12-24 PROCEDURE — 63710000001 INSULIN LISPRO (HUMAN) PER 5 UNITS: Performed by: STUDENT IN AN ORGANIZED HEALTH CARE EDUCATION/TRAINING PROGRAM

## 2020-12-24 PROCEDURE — 73223 MRI JOINT UPR EXTR W/O&W/DYE: CPT

## 2020-12-24 PROCEDURE — 83935 ASSAY OF URINE OSMOLALITY: CPT | Performed by: HOSPITALIST

## 2020-12-24 PROCEDURE — 87147 CULTURE TYPE IMMUNOLOGIC: CPT | Performed by: STUDENT IN AN ORGANIZED HEALTH CARE EDUCATION/TRAINING PROGRAM

## 2020-12-24 PROCEDURE — 87070 CULTURE OTHR SPECIMN AEROBIC: CPT | Performed by: STUDENT IN AN ORGANIZED HEALTH CARE EDUCATION/TRAINING PROGRAM

## 2020-12-24 PROCEDURE — 99232 SBSQ HOSP IP/OBS MODERATE 35: CPT | Performed by: HOSPITALIST

## 2020-12-24 PROCEDURE — 25010000002 METOCLOPRAMIDE PER 10 MG: Performed by: ANESTHESIOLOGY

## 2020-12-24 PROCEDURE — 25010000002 GADOTERIDOL PER 1 ML: Performed by: HOSPITALIST

## 2020-12-24 PROCEDURE — A9579 GAD-BASE MR CONTRAST NOS,1ML: HCPCS | Performed by: HOSPITALIST

## 2020-12-24 PROCEDURE — 80202 ASSAY OF VANCOMYCIN: CPT | Performed by: STUDENT IN AN ORGANIZED HEALTH CARE EDUCATION/TRAINING PROGRAM

## 2020-12-24 PROCEDURE — 0R9K30Z DRAINAGE OF LEFT SHOULDER JOINT WITH DRAINAGE DEVICE, PERCUTANEOUS APPROACH: ICD-10-PCS | Performed by: STUDENT IN AN ORGANIZED HEALTH CARE EDUCATION/TRAINING PROGRAM

## 2020-12-24 PROCEDURE — 25010000002 ONDANSETRON PER 1 MG: Performed by: ANESTHESIOLOGY

## 2020-12-24 PROCEDURE — 25010000002 HYDROMORPHONE PER 4 MG: Performed by: ANESTHESIOLOGY

## 2020-12-24 PROCEDURE — 85025 COMPLETE CBC W/AUTO DIFF WBC: CPT | Performed by: STUDENT IN AN ORGANIZED HEALTH CARE EDUCATION/TRAINING PROGRAM

## 2020-12-24 PROCEDURE — 25010000002 MIDAZOLAM PER 1 MG: Performed by: ANESTHESIOLOGY

## 2020-12-24 PROCEDURE — 99233 SBSQ HOSP IP/OBS HIGH 50: CPT | Performed by: THORACIC SURGERY (CARDIOTHORACIC VASCULAR SURGERY)

## 2020-12-24 PROCEDURE — 89060 EXAM SYNOVIAL FLUID CRYSTALS: CPT | Performed by: STUDENT IN AN ORGANIZED HEALTH CARE EDUCATION/TRAINING PROGRAM

## 2020-12-24 PROCEDURE — 80053 COMPREHEN METABOLIC PANEL: CPT | Performed by: STUDENT IN AN ORGANIZED HEALTH CARE EDUCATION/TRAINING PROGRAM

## 2020-12-24 PROCEDURE — 85651 RBC SED RATE NONAUTOMATED: CPT | Performed by: INTERNAL MEDICINE

## 2020-12-24 PROCEDURE — 25010000002 VANCOMYCIN 10 G RECONSTITUTED SOLUTION: Performed by: HOSPITALIST

## 2020-12-24 PROCEDURE — 82550 ASSAY OF CK (CPK): CPT | Performed by: HOSPITALIST

## 2020-12-24 PROCEDURE — 25010000002 FENTANYL CITRATE (PF) 100 MCG/2ML SOLUTION: Performed by: ANESTHESIOLOGY

## 2020-12-24 PROCEDURE — 87176 TISSUE HOMOGENIZATION CULTR: CPT | Performed by: STUDENT IN AN ORGANIZED HEALTH CARE EDUCATION/TRAINING PROGRAM

## 2020-12-24 PROCEDURE — 87186 SC STD MICRODIL/AGAR DIL: CPT | Performed by: STUDENT IN AN ORGANIZED HEALTH CARE EDUCATION/TRAINING PROGRAM

## 2020-12-24 PROCEDURE — 84300 ASSAY OF URINE SODIUM: CPT | Performed by: HOSPITALIST

## 2020-12-24 PROCEDURE — 0KB60ZZ EXCISION OF LEFT SHOULDER MUSCLE, OPEN APPROACH: ICD-10-PCS | Performed by: STUDENT IN AN ORGANIZED HEALTH CARE EDUCATION/TRAINING PROGRAM

## 2020-12-24 PROCEDURE — 87075 CULTR BACTERIA EXCEPT BLOOD: CPT | Performed by: HOSPITALIST

## 2020-12-24 PROCEDURE — 83735 ASSAY OF MAGNESIUM: CPT | Performed by: STUDENT IN AN ORGANIZED HEALTH CARE EDUCATION/TRAINING PROGRAM

## 2020-12-24 PROCEDURE — 82962 GLUCOSE BLOOD TEST: CPT

## 2020-12-24 PROCEDURE — 25010000003 LIDOCAINE 1 % SOLUTION: Performed by: RADIOLOGY

## 2020-12-24 PROCEDURE — 0R9K3ZX DRAINAGE OF LEFT SHOULDER JOINT, PERCUTANEOUS APPROACH, DIAGNOSTIC: ICD-10-PCS | Performed by: RADIOLOGY

## 2020-12-24 PROCEDURE — 99233 SBSQ HOSP IP/OBS HIGH 50: CPT | Performed by: INTERNAL MEDICINE

## 2020-12-24 PROCEDURE — 87205 SMEAR GRAM STAIN: CPT | Performed by: STUDENT IN AN ORGANIZED HEALTH CARE EDUCATION/TRAINING PROGRAM

## 2020-12-24 PROCEDURE — 77002 NEEDLE LOCALIZATION BY XRAY: CPT

## 2020-12-24 RX ORDER — LIDOCAINE HYDROCHLORIDE 10 MG/ML
INJECTION, SOLUTION INFILTRATION; PERINEURAL
Status: COMPLETED | OUTPATIENT
Start: 2020-12-24 | End: 2020-12-24

## 2020-12-24 RX ORDER — PROPOFOL 10 MG/ML
VIAL (ML) INTRAVENOUS AS NEEDED
Status: DISCONTINUED | OUTPATIENT
Start: 2020-12-24 | End: 2020-12-24 | Stop reason: SURG

## 2020-12-24 RX ORDER — ACETAMINOPHEN 650 MG/1
650 SUPPOSITORY RECTAL ONCE AS NEEDED
Status: DISCONTINUED | OUTPATIENT
Start: 2020-12-24 | End: 2020-12-25

## 2020-12-24 RX ORDER — FENTANYL CITRATE 50 UG/ML
INJECTION, SOLUTION INTRAMUSCULAR; INTRAVENOUS AS NEEDED
Status: DISCONTINUED | OUTPATIENT
Start: 2020-12-24 | End: 2020-12-24 | Stop reason: SURG

## 2020-12-24 RX ORDER — LIDOCAINE HYDROCHLORIDE AND EPINEPHRINE 5; 5 MG/ML; UG/ML
INJECTION, SOLUTION INFILTRATION; PERINEURAL AS NEEDED
Status: DISCONTINUED | OUTPATIENT
Start: 2020-12-24 | End: 2020-12-24 | Stop reason: HOSPADM

## 2020-12-24 RX ORDER — HYDROMORPHONE HCL 110MG/55ML
PATIENT CONTROLLED ANALGESIA SYRINGE INTRAVENOUS AS NEEDED
Status: DISCONTINUED | OUTPATIENT
Start: 2020-12-24 | End: 2020-12-24 | Stop reason: SURG

## 2020-12-24 RX ORDER — ONDANSETRON 2 MG/ML
4 INJECTION INTRAMUSCULAR; INTRAVENOUS ONCE AS NEEDED
Status: DISCONTINUED | OUTPATIENT
Start: 2020-12-24 | End: 2020-12-25

## 2020-12-24 RX ORDER — KETAMINE HYDROCHLORIDE 10 MG/ML
INJECTION INTRAMUSCULAR; INTRAVENOUS AS NEEDED
Status: DISCONTINUED | OUTPATIENT
Start: 2020-12-24 | End: 2020-12-24 | Stop reason: SURG

## 2020-12-24 RX ORDER — HYDROMORPHONE HCL 110MG/55ML
0.5 PATIENT CONTROLLED ANALGESIA SYRINGE INTRAVENOUS
Status: DISCONTINUED | OUTPATIENT
Start: 2020-12-24 | End: 2020-12-25

## 2020-12-24 RX ORDER — ROCURONIUM BROMIDE 10 MG/ML
INJECTION, SOLUTION INTRAVENOUS AS NEEDED
Status: DISCONTINUED | OUTPATIENT
Start: 2020-12-24 | End: 2020-12-24 | Stop reason: SURG

## 2020-12-24 RX ORDER — ACETAMINOPHEN 325 MG/1
650 TABLET ORAL ONCE AS NEEDED
Status: DISCONTINUED | OUTPATIENT
Start: 2020-12-24 | End: 2020-12-25

## 2020-12-24 RX ORDER — IPRATROPIUM BROMIDE AND ALBUTEROL SULFATE 2.5; .5 MG/3ML; MG/3ML
3 SOLUTION RESPIRATORY (INHALATION) ONCE AS NEEDED
Status: DISCONTINUED | OUTPATIENT
Start: 2020-12-24 | End: 2020-12-25

## 2020-12-24 RX ORDER — METOCLOPRAMIDE HYDROCHLORIDE 5 MG/ML
INJECTION INTRAMUSCULAR; INTRAVENOUS AS NEEDED
Status: DISCONTINUED | OUTPATIENT
Start: 2020-12-24 | End: 2020-12-24 | Stop reason: SURG

## 2020-12-24 RX ORDER — ONDANSETRON 2 MG/ML
INJECTION INTRAMUSCULAR; INTRAVENOUS AS NEEDED
Status: DISCONTINUED | OUTPATIENT
Start: 2020-12-24 | End: 2020-12-24 | Stop reason: SURG

## 2020-12-24 RX ORDER — GLYCOPYRROLATE 1 MG/5 ML
SYRINGE (ML) INTRAVENOUS AS NEEDED
Status: DISCONTINUED | OUTPATIENT
Start: 2020-12-24 | End: 2020-12-24 | Stop reason: SURG

## 2020-12-24 RX ORDER — PHENYLEPHRINE HCL IN 0.9% NACL 0.5 MG/5ML
SYRINGE (ML) INTRAVENOUS AS NEEDED
Status: DISCONTINUED | OUTPATIENT
Start: 2020-12-24 | End: 2020-12-24 | Stop reason: SURG

## 2020-12-24 RX ORDER — MORPHINE SULFATE 4 MG/ML
2 INJECTION, SOLUTION INTRAMUSCULAR; INTRAVENOUS
Status: ACTIVE | OUTPATIENT
Start: 2020-12-24 | End: 2020-12-24

## 2020-12-24 RX ORDER — VANCOMYCIN 1.75 GRAM/500 ML IN 0.9 % SODIUM CHLORIDE INTRAVENOUS
1750 EVERY 12 HOURS
Status: DISCONTINUED | OUTPATIENT
Start: 2020-12-24 | End: 2020-12-25

## 2020-12-24 RX ORDER — NEOSTIGMINE METHYLSULFATE 5 MG/5 ML
SYRINGE (ML) INTRAVENOUS AS NEEDED
Status: DISCONTINUED | OUTPATIENT
Start: 2020-12-24 | End: 2020-12-24 | Stop reason: SURG

## 2020-12-24 RX ORDER — MIDAZOLAM HYDROCHLORIDE 1 MG/ML
INJECTION INTRAMUSCULAR; INTRAVENOUS AS NEEDED
Status: DISCONTINUED | OUTPATIENT
Start: 2020-12-24 | End: 2020-12-24 | Stop reason: SURG

## 2020-12-24 RX ADMIN — LIDOCAINE HYDROCHLORIDE 5 ML: 10 INJECTION, SOLUTION INFILTRATION; PERINEURAL at 09:09

## 2020-12-24 RX ADMIN — HYDROMORPHONE HYDROCHLORIDE 1 MG: 2 INJECTION INTRAMUSCULAR; INTRAVENOUS; SUBCUTANEOUS at 22:33

## 2020-12-24 RX ADMIN — ROCURONIUM BROMIDE 10 MG: 10 INJECTION, SOLUTION INTRAVENOUS at 21:35

## 2020-12-24 RX ADMIN — INSULIN LISPRO 4 UNITS: 100 INJECTION, SOLUTION INTRAVENOUS; SUBCUTANEOUS at 10:36

## 2020-12-24 RX ADMIN — INSULIN LISPRO 6 UNITS: 100 INJECTION, SOLUTION INTRAVENOUS; SUBCUTANEOUS at 22:54

## 2020-12-24 RX ADMIN — OXYCODONE 5 MG: 5 TABLET ORAL at 10:35

## 2020-12-24 RX ADMIN — Medication 0.8 MG: at 22:29

## 2020-12-24 RX ADMIN — Medication 1 MG: at 00:11

## 2020-12-24 RX ADMIN — ONDANSETRON 4 MG: 2 INJECTION INTRAMUSCULAR; INTRAVENOUS at 22:17

## 2020-12-24 RX ADMIN — GADOTERIDOL 20 ML: 279.3 INJECTION, SOLUTION INTRAVENOUS at 13:00

## 2020-12-24 RX ADMIN — ROCURONIUM BROMIDE 40 MG: 10 INJECTION, SOLUTION INTRAVENOUS at 20:42

## 2020-12-24 RX ADMIN — PHENYLEPHRINE HYDROCHLORIDE 100 MCG: 10 INJECTION INTRAVENOUS at 21:01

## 2020-12-24 RX ADMIN — MIDAZOLAM 2 MG: 1 INJECTION INTRAMUSCULAR; INTRAVENOUS at 20:42

## 2020-12-24 RX ADMIN — Medication 10 ML: at 10:36

## 2020-12-24 RX ADMIN — CEFAZOLIN SODIUM 2 G: 1 INJECTION, POWDER, FOR SOLUTION INTRAMUSCULAR; INTRAVENOUS at 21:04

## 2020-12-24 RX ADMIN — HYDROMORPHONE HYDROCHLORIDE 0.5 MG: 2 INJECTION INTRAMUSCULAR; INTRAVENOUS; SUBCUTANEOUS at 22:30

## 2020-12-24 RX ADMIN — KETAMINE HYDROCHLORIDE 50 MG: 10 INJECTION INTRAMUSCULAR; INTRAVENOUS at 20:42

## 2020-12-24 RX ADMIN — SODIUM CHLORIDE: 9 INJECTION, SOLUTION INTRAVENOUS at 22:13

## 2020-12-24 RX ADMIN — KETAMINE HYDROCHLORIDE 27 MG: 50 INJECTION, SOLUTION INTRAMUSCULAR; INTRAVENOUS at 10:31

## 2020-12-24 RX ADMIN — METOCLOPRAMIDE 10 MG: 5 INJECTION, SOLUTION INTRAMUSCULAR; INTRAVENOUS at 21:17

## 2020-12-24 RX ADMIN — FENTANYL CITRATE 100 MCG: 50 INJECTION, SOLUTION INTRAMUSCULAR; INTRAVENOUS at 20:42

## 2020-12-24 RX ADMIN — Medication 4 MG: at 22:29

## 2020-12-24 RX ADMIN — VANCOMYCIN HYDROCHLORIDE 1750 MG: 10 INJECTION, POWDER, LYOPHILIZED, FOR SOLUTION INTRAVENOUS at 10:38

## 2020-12-24 RX ADMIN — HYDROMORPHONE HYDROCHLORIDE 0.5 MG: 2 INJECTION INTRAMUSCULAR; INTRAVENOUS; SUBCUTANEOUS at 22:37

## 2020-12-24 RX ADMIN — PROPOFOL 120 MG: 10 INJECTION, EMULSION INTRAVENOUS at 20:42

## 2020-12-24 RX ADMIN — ONDANSETRON 4 MG: 2 INJECTION INTRAMUSCULAR; INTRAVENOUS at 21:06

## 2020-12-24 RX ADMIN — SODIUM CHLORIDE 100 ML/HR: 9 INJECTION, SOLUTION INTRAVENOUS at 06:37

## 2020-12-24 RX ADMIN — PHENYLEPHRINE HYDROCHLORIDE 100 MCG: 10 INJECTION INTRAVENOUS at 21:16

## 2020-12-24 RX ADMIN — PHENYLEPHRINE HYDROCHLORIDE 100 MCG: 10 INJECTION INTRAVENOUS at 20:59

## 2020-12-24 NOTE — ANESTHESIA PREPROCEDURE EVALUATION
Anesthesia Evaluation     Patient summary reviewed and Nursing notes reviewed   NPO Solid Status: > 8 hours  NPO Liquid Status: > 8 hours           Airway   Mallampati: II  TM distance: >3 FB  Neck ROM: full  No difficulty expected  Dental - normal exam   (+) edentulous    Pulmonary - normal exam   (+) a smoker Current Smoked day of surgery,   Cardiovascular - normal exam    Rhythm: regular  Rate: normal    (+) hypertension, hyperlipidemia,       Neuro/Psych  (+) psychiatric history Depression and Anxiety,     GI/Hepatic/Renal/Endo    (+)   diabetes mellitus type 2 poorly controlled,     Musculoskeletal     Abdominal  - normal exam    Bowel sounds: normal.   Substance History   (+) drug use     OB/GYN          Other   arthritis,                    Anesthesia Plan    ASA 3     general     intravenous induction     Anesthetic plan, all risks, benefits, and alternatives have been provided, discussed and informed consent has been obtained with: patient.

## 2020-12-25 ENCOUNTER — APPOINTMENT (OUTPATIENT)
Dept: GENERAL RADIOLOGY | Facility: HOSPITAL | Age: 45
End: 2020-12-25

## 2020-12-25 LAB
ALBUMIN SERPL-MCNC: 2.4 G/DL (ref 3.5–5.2)
ALBUMIN/GLOB SERPL: 0.8 G/DL
ALP SERPL-CCNC: 137 U/L (ref 39–117)
ALT SERPL W P-5'-P-CCNC: 60 U/L (ref 1–33)
ANION GAP SERPL CALCULATED.3IONS-SCNC: 12 MMOL/L (ref 5–15)
ANISOCYTOSIS BLD QL: NORMAL
AST SERPL-CCNC: 69 U/L (ref 1–32)
BACTERIA SPEC AEROBE CULT: ABNORMAL
BASOPHILS # BLD AUTO: 0 10*3/MM3 (ref 0–0.2)
BASOPHILS NFR BLD AUTO: 0.3 % (ref 0–1.5)
BILIRUB SERPL-MCNC: 0.5 MG/DL (ref 0–1.2)
BUN SERPL-MCNC: 23 MG/DL (ref 6–20)
BUN/CREAT SERPL: 27.4 (ref 7–25)
CALCIUM SPEC-SCNC: 7.6 MG/DL (ref 8.6–10.5)
CHLORIDE SERPL-SCNC: 96 MMOL/L (ref 98–107)
CK SERPL-CCNC: 297 U/L (ref 20–180)
CO2 SERPL-SCNC: 17 MMOL/L (ref 22–29)
CREAT SERPL-MCNC: 0.84 MG/DL (ref 0.57–1)
CRP SERPL-MCNC: 16.69 MG/DL (ref 0–0.5)
D-LACTATE SERPL-SCNC: 3.1 MMOL/L (ref 0.5–2)
DEPRECATED RDW RBC AUTO: 56 FL (ref 37–54)
EOSINOPHIL # BLD AUTO: 0 10*3/MM3 (ref 0–0.4)
EOSINOPHIL NFR BLD AUTO: 0.3 % (ref 0.3–6.2)
ERYTHROCYTE [DISTWIDTH] IN BLOOD BY AUTOMATED COUNT: 17 % (ref 12.3–15.4)
ERYTHROCYTE [SEDIMENTATION RATE] IN BLOOD: 73 MM/HR (ref 0–20)
GFR SERPL CREATININE-BSD FRML MDRD: 74 ML/MIN/1.73
GLOBULIN UR ELPH-MCNC: 3.1 GM/DL
GLUCOSE BLDC GLUCOMTR-MCNC: 176 MG/DL (ref 70–105)
GLUCOSE BLDC GLUCOMTR-MCNC: 186 MG/DL (ref 70–105)
GLUCOSE BLDC GLUCOMTR-MCNC: 286 MG/DL (ref 70–105)
GLUCOSE BLDC GLUCOMTR-MCNC: 301 MG/DL (ref 70–105)
GLUCOSE SERPL-MCNC: 322 MG/DL (ref 65–99)
GRAM STN SPEC: ABNORMAL
GRAM STN SPEC: ABNORMAL
HCT VFR BLD AUTO: 35 % (ref 34–46.6)
HGB BLD-MCNC: 11.4 G/DL (ref 12–15.9)
ISOLATED FROM: ABNORMAL
LARGE PLATELETS: NORMAL
LYMPHOCYTES # BLD AUTO: 2 10*3/MM3 (ref 0.7–3.1)
LYMPHOCYTES NFR BLD AUTO: 17.1 % (ref 19.6–45.3)
MAGNESIUM SERPL-MCNC: 2.1 MG/DL (ref 1.6–2.6)
MCH RBC QN AUTO: 31 PG (ref 26.6–33)
MCHC RBC AUTO-ENTMCNC: 32.5 G/DL (ref 31.5–35.7)
MCV RBC AUTO: 95.4 FL (ref 79–97)
MONOCYTES # BLD AUTO: 1 10*3/MM3 (ref 0.1–0.9)
MONOCYTES NFR BLD AUTO: 8.6 % (ref 5–12)
NEUTROPHILS NFR BLD AUTO: 73.7 % (ref 42.7–76)
NEUTROPHILS NFR BLD AUTO: 8.9 10*3/MM3 (ref 1.7–7)
NRBC BLD AUTO-RTO: 0.2 /100 WBC (ref 0–0.2)
PLATELET # BLD AUTO: 149 10*3/MM3 (ref 140–450)
PMV BLD AUTO: 9.8 FL (ref 6–12)
POTASSIUM SERPL-SCNC: 4.2 MMOL/L (ref 3.5–5.2)
PROCALCITONIN SERPL-MCNC: 1.04 NG/ML (ref 0–0.25)
PROT SERPL-MCNC: 5.5 G/DL (ref 6–8.5)
RBC # BLD AUTO: 3.67 10*6/MM3 (ref 3.77–5.28)
SMALL PLATELETS BLD QL SMEAR: NORMAL
SODIUM SERPL-SCNC: 125 MMOL/L (ref 136–145)
TSH SERPL DL<=0.05 MIU/L-ACNC: 3.94 UIU/ML (ref 0.27–4.2)
URATE SERPL-MCNC: 4.9 MG/DL (ref 2.4–5.7)
WBC # BLD AUTO: 12 10*3/MM3 (ref 3.4–10.8)
WBC MORPH BLD: NORMAL

## 2020-12-25 PROCEDURE — 84550 ASSAY OF BLOOD/URIC ACID: CPT | Performed by: STUDENT IN AN ORGANIZED HEALTH CARE EDUCATION/TRAINING PROGRAM

## 2020-12-25 PROCEDURE — 84443 ASSAY THYROID STIM HORMONE: CPT | Performed by: STUDENT IN AN ORGANIZED HEALTH CARE EDUCATION/TRAINING PROGRAM

## 2020-12-25 PROCEDURE — 80053 COMPREHEN METABOLIC PANEL: CPT | Performed by: STUDENT IN AN ORGANIZED HEALTH CARE EDUCATION/TRAINING PROGRAM

## 2020-12-25 PROCEDURE — 63710000001 INSULIN LISPRO (HUMAN) PER 5 UNITS: Performed by: STUDENT IN AN ORGANIZED HEALTH CARE EDUCATION/TRAINING PROGRAM

## 2020-12-25 PROCEDURE — 25010000002 CEFTRIAXONE PER 250 MG: Performed by: INTERNAL MEDICINE

## 2020-12-25 PROCEDURE — 84145 PROCALCITONIN (PCT): CPT | Performed by: STUDENT IN AN ORGANIZED HEALTH CARE EDUCATION/TRAINING PROGRAM

## 2020-12-25 PROCEDURE — 85025 COMPLETE CBC W/AUTO DIFF WBC: CPT | Performed by: STUDENT IN AN ORGANIZED HEALTH CARE EDUCATION/TRAINING PROGRAM

## 2020-12-25 PROCEDURE — 86140 C-REACTIVE PROTEIN: CPT | Performed by: STUDENT IN AN ORGANIZED HEALTH CARE EDUCATION/TRAINING PROGRAM

## 2020-12-25 PROCEDURE — 82962 GLUCOSE BLOOD TEST: CPT

## 2020-12-25 PROCEDURE — 63710000001 INSULIN GLARGINE PER 5 UNITS: Performed by: HOSPITALIST

## 2020-12-25 PROCEDURE — 83735 ASSAY OF MAGNESIUM: CPT | Performed by: STUDENT IN AN ORGANIZED HEALTH CARE EDUCATION/TRAINING PROGRAM

## 2020-12-25 PROCEDURE — 85007 BL SMEAR W/DIFF WBC COUNT: CPT | Performed by: STUDENT IN AN ORGANIZED HEALTH CARE EDUCATION/TRAINING PROGRAM

## 2020-12-25 PROCEDURE — 85651 RBC SED RATE NONAUTOMATED: CPT | Performed by: STUDENT IN AN ORGANIZED HEALTH CARE EDUCATION/TRAINING PROGRAM

## 2020-12-25 PROCEDURE — 25010000002 VANCOMYCIN 10 G RECONSTITUTED SOLUTION: Performed by: STUDENT IN AN ORGANIZED HEALTH CARE EDUCATION/TRAINING PROGRAM

## 2020-12-25 PROCEDURE — 99232 SBSQ HOSP IP/OBS MODERATE 35: CPT | Performed by: HOSPITALIST

## 2020-12-25 PROCEDURE — 82550 ASSAY OF CK (CPK): CPT | Performed by: STUDENT IN AN ORGANIZED HEALTH CARE EDUCATION/TRAINING PROGRAM

## 2020-12-25 PROCEDURE — 99232 SBSQ HOSP IP/OBS MODERATE 35: CPT | Performed by: INTERNAL MEDICINE

## 2020-12-25 PROCEDURE — 25010000002 ENOXAPARIN PER 10 MG: Performed by: STUDENT IN AN ORGANIZED HEALTH CARE EDUCATION/TRAINING PROGRAM

## 2020-12-25 PROCEDURE — 83605 ASSAY OF LACTIC ACID: CPT | Performed by: STUDENT IN AN ORGANIZED HEALTH CARE EDUCATION/TRAINING PROGRAM

## 2020-12-25 RX ORDER — INSULIN GLARGINE 100 [IU]/ML
5 INJECTION, SOLUTION SUBCUTANEOUS NIGHTLY
Status: DISCONTINUED | OUTPATIENT
Start: 2020-12-25 | End: 2020-12-29 | Stop reason: HOSPADM

## 2020-12-25 RX ADMIN — OXYCODONE 5 MG: 5 TABLET ORAL at 17:47

## 2020-12-25 RX ADMIN — METOPROLOL TARTRATE 12.5 MG: 25 TABLET, FILM COATED ORAL at 00:27

## 2020-12-25 RX ADMIN — OXYCODONE 5 MG: 5 TABLET ORAL at 01:31

## 2020-12-25 RX ADMIN — INSULIN LISPRO 2 UNITS: 100 INJECTION, SOLUTION INTRAVENOUS; SUBCUTANEOUS at 08:51

## 2020-12-25 RX ADMIN — VANCOMYCIN HYDROCHLORIDE 1750 MG: 10 INJECTION, POWDER, LYOPHILIZED, FOR SOLUTION INTRAVENOUS at 00:27

## 2020-12-25 RX ADMIN — INSULIN LISPRO 8 UNITS: 100 INJECTION, SOLUTION INTRAVENOUS; SUBCUTANEOUS at 12:14

## 2020-12-25 RX ADMIN — CEFTRIAXONE SODIUM 2 G: 2 INJECTION, POWDER, FOR SOLUTION INTRAMUSCULAR; INTRAVENOUS at 17:47

## 2020-12-25 RX ADMIN — OXYCODONE 5 MG: 5 TABLET ORAL at 08:51

## 2020-12-25 RX ADMIN — METOPROLOL TARTRATE 12.5 MG: 25 TABLET, FILM COATED ORAL at 21:29

## 2020-12-25 RX ADMIN — SODIUM CHLORIDE 125 ML/HR: 9 INJECTION, SOLUTION INTRAVENOUS at 21:12

## 2020-12-25 RX ADMIN — Medication 10 ML: at 08:51

## 2020-12-25 RX ADMIN — ENOXAPARIN SODIUM 40 MG: 40 INJECTION SUBCUTANEOUS at 17:46

## 2020-12-25 RX ADMIN — Medication 10 ML: at 21:34

## 2020-12-25 RX ADMIN — INSULIN GLARGINE 5 UNITS: 100 INJECTION, SOLUTION SUBCUTANEOUS at 21:35

## 2020-12-25 RX ADMIN — SODIUM CHLORIDE 100 ML/HR: 9 INJECTION, SOLUTION INTRAVENOUS at 02:44

## 2020-12-25 NOTE — ANESTHESIA POSTPROCEDURE EVALUATION
Patient: Radha Curtis    Procedure Summary     Date: 12/24/20 Room / Location: [facility] OR 09 / [facility] MAIN OR    Anesthesia Start: 2038 Anesthesia Stop: 2247    Procedures:       SHOULDER ACROMIOCLAVICULAR JOINT RESECTION (Left Shoulder)      Arthrotomy of left shoulder with irrigation and decompression of trapezial abscess with irrigation and debridement (Left Arm Upper) Diagnosis:       Septic arthritis of AC joint (CMS/HCC)      (Septic arthritis of AC joint (CMS/HCC) [M00.9])    Surgeon: Clayton Bergeron MD Provider: Rafy Evans MD    Anesthesia Type: general ASA Status: 3          Anesthesia Type: general    Vitals  Vitals Value Taken Time   /69 12/24/20 2336   Temp 97.5 °F (36.4 °C) 12/24/20 2330   Pulse 100 12/24/20 2337   Resp 18 12/24/20 2330   SpO2 93 % 12/24/20 2337   Vitals shown include unvalidated device data.        Post Anesthesia Care and Evaluation    Patient location during evaluation: PACU  Patient participation: complete - patient participated  Level of consciousness: awake  Pain scale: See nurse's notes for pain score.  Pain management: adequate  Airway patency: patent  Anesthetic complications: No anesthetic complications  PONV Status: none  Cardiovascular status: acceptable  Respiratory status: acceptable  Hydration status: acceptable    Comments: Patient seen and examined postoperatively; vital signs stable; SpO2 greater than or equal to 90%; cardiopulmonary status stable; nausea/vomiting adequately controlled; pain adequately controlled; no apparent anesthesia complications; patient discharged from anesthesia care when discharge criteria were met

## 2020-12-25 NOTE — ANESTHESIA PROCEDURE NOTES
Airway  Urgency: elective    Date/Time: 12/24/2020 8:43 PM  Airway not difficult    General Information and Staff    Patient location during procedure: OR  Anesthesiologist: Rafy Evans MD    Indications and Patient Condition  Indications for airway management: airway protection    Preoxygenated: yes  Mask difficulty assessment: 0 - not attempted    Final Airway Details  Final airway type: endotracheal airway      Successful airway: ETT  Cuffed: yes   Successful intubation technique: direct laryngoscopy  Facilitating devices/methods: intubating stylet  Endotracheal tube insertion site: oral  Blade: Fer  Blade size: 3  ETT size (mm): 7.0  Cormack-Lehane Classification: grade IIa - partial view of glottis  Placement verified by: chest auscultation and capnometry   Measured from: gums  ETT/EBT to gums (cm): 20  Number of attempts at approach: 1  Assessment: lips, teeth, and gum same as pre-op and atraumatic intubation

## 2020-12-26 LAB
ALBUMIN SERPL-MCNC: 2.3 G/DL (ref 3.5–5.2)
ALBUMIN/GLOB SERPL: 0.8 G/DL
ALP SERPL-CCNC: 149 U/L (ref 39–117)
ALT SERPL W P-5'-P-CCNC: 67 U/L (ref 1–33)
ANION GAP SERPL CALCULATED.3IONS-SCNC: 9 MMOL/L (ref 5–15)
AST SERPL-CCNC: 80 U/L (ref 1–32)
BASOPHILS # BLD AUTO: 0.1 10*3/MM3 (ref 0–0.2)
BASOPHILS NFR BLD AUTO: 0.6 % (ref 0–1.5)
BILIRUB SERPL-MCNC: 0.5 MG/DL (ref 0–1.2)
BUN SERPL-MCNC: 17 MG/DL (ref 6–20)
BUN/CREAT SERPL: 24.6 (ref 7–25)
CALCIUM SPEC-SCNC: 7.9 MG/DL (ref 8.6–10.5)
CHLORIDE SERPL-SCNC: 98 MMOL/L (ref 98–107)
CO2 SERPL-SCNC: 20 MMOL/L (ref 22–29)
CREAT SERPL-MCNC: 0.69 MG/DL (ref 0.57–1)
DEPRECATED RDW RBC AUTO: 55.6 FL (ref 37–54)
EOSINOPHIL # BLD AUTO: 0.1 10*3/MM3 (ref 0–0.4)
EOSINOPHIL NFR BLD AUTO: 1 % (ref 0.3–6.2)
ERYTHROCYTE [DISTWIDTH] IN BLOOD BY AUTOMATED COUNT: 16.9 % (ref 12.3–15.4)
GFR SERPL CREATININE-BSD FRML MDRD: 92 ML/MIN/1.73
GLOBULIN UR ELPH-MCNC: 3 GM/DL
GLUCOSE BLDC GLUCOMTR-MCNC: 133 MG/DL (ref 70–105)
GLUCOSE BLDC GLUCOMTR-MCNC: 134 MG/DL (ref 70–105)
GLUCOSE BLDC GLUCOMTR-MCNC: 255 MG/DL (ref 70–105)
GLUCOSE BLDC GLUCOMTR-MCNC: 99 MG/DL (ref 70–105)
GLUCOSE SERPL-MCNC: 180 MG/DL (ref 65–99)
HCT VFR BLD AUTO: 34 % (ref 34–46.6)
HGB BLD-MCNC: 11.2 G/DL (ref 12–15.9)
LYMPHOCYTES # BLD AUTO: 2.1 10*3/MM3 (ref 0.7–3.1)
LYMPHOCYTES NFR BLD AUTO: 20.5 % (ref 19.6–45.3)
MAGNESIUM SERPL-MCNC: 2.1 MG/DL (ref 1.6–2.6)
MCH RBC QN AUTO: 31.1 PG (ref 26.6–33)
MCHC RBC AUTO-ENTMCNC: 33 G/DL (ref 31.5–35.7)
MCV RBC AUTO: 94.2 FL (ref 79–97)
MONOCYTES # BLD AUTO: 0.9 10*3/MM3 (ref 0.1–0.9)
MONOCYTES NFR BLD AUTO: 9.2 % (ref 5–12)
NEUTROPHILS NFR BLD AUTO: 68.7 % (ref 42.7–76)
NEUTROPHILS NFR BLD AUTO: 7 10*3/MM3 (ref 1.7–7)
NRBC BLD AUTO-RTO: 0.2 /100 WBC (ref 0–0.2)
PLATELET # BLD AUTO: 166 10*3/MM3 (ref 140–450)
PMV BLD AUTO: 9.2 FL (ref 6–12)
POTASSIUM SERPL-SCNC: 3.5 MMOL/L (ref 3.5–5.2)
PROT SERPL-MCNC: 5.3 G/DL (ref 6–8.5)
RBC # BLD AUTO: 3.61 10*6/MM3 (ref 3.77–5.28)
SODIUM SERPL-SCNC: 127 MMOL/L (ref 136–145)
WBC # BLD AUTO: 10.1 10*3/MM3 (ref 3.4–10.8)

## 2020-12-26 PROCEDURE — 25010000002 CEFTRIAXONE PER 250 MG: Performed by: INTERNAL MEDICINE

## 2020-12-26 PROCEDURE — 80053 COMPREHEN METABOLIC PANEL: CPT | Performed by: STUDENT IN AN ORGANIZED HEALTH CARE EDUCATION/TRAINING PROGRAM

## 2020-12-26 PROCEDURE — 99232 SBSQ HOSP IP/OBS MODERATE 35: CPT | Performed by: INTERNAL MEDICINE

## 2020-12-26 PROCEDURE — 85025 COMPLETE CBC W/AUTO DIFF WBC: CPT | Performed by: INTERNAL MEDICINE

## 2020-12-26 PROCEDURE — 83735 ASSAY OF MAGNESIUM: CPT | Performed by: STUDENT IN AN ORGANIZED HEALTH CARE EDUCATION/TRAINING PROGRAM

## 2020-12-26 PROCEDURE — 99232 SBSQ HOSP IP/OBS MODERATE 35: CPT | Performed by: HOSPITALIST

## 2020-12-26 PROCEDURE — 63710000001 INSULIN GLARGINE PER 5 UNITS: Performed by: HOSPITALIST

## 2020-12-26 PROCEDURE — 25010000002 ENOXAPARIN PER 10 MG: Performed by: STUDENT IN AN ORGANIZED HEALTH CARE EDUCATION/TRAINING PROGRAM

## 2020-12-26 PROCEDURE — 63710000001 INSULIN LISPRO (HUMAN) PER 5 UNITS: Performed by: STUDENT IN AN ORGANIZED HEALTH CARE EDUCATION/TRAINING PROGRAM

## 2020-12-26 PROCEDURE — 82962 GLUCOSE BLOOD TEST: CPT

## 2020-12-26 RX ORDER — POTASSIUM CHLORIDE 20 MEQ/1
40 TABLET, EXTENDED RELEASE ORAL ONCE
Status: DISCONTINUED | OUTPATIENT
Start: 2020-12-26 | End: 2020-12-29 | Stop reason: HOSPADM

## 2020-12-26 RX ADMIN — OXYCODONE 5 MG: 5 TABLET ORAL at 07:49

## 2020-12-26 RX ADMIN — METOPROLOL TARTRATE 12.5 MG: 25 TABLET, FILM COATED ORAL at 21:33

## 2020-12-26 RX ADMIN — SODIUM CHLORIDE 125 ML/HR: 9 INJECTION, SOLUTION INTRAVENOUS at 18:16

## 2020-12-26 RX ADMIN — INSULIN GLARGINE 5 UNITS: 100 INJECTION, SOLUTION SUBCUTANEOUS at 22:00

## 2020-12-26 RX ADMIN — OXYCODONE 5 MG: 5 TABLET ORAL at 01:17

## 2020-12-26 RX ADMIN — OXYCODONE 5 MG: 5 TABLET ORAL at 21:33

## 2020-12-26 RX ADMIN — Medication 10 ML: at 09:01

## 2020-12-26 RX ADMIN — SODIUM CHLORIDE 125 ML/HR: 9 INJECTION, SOLUTION INTRAVENOUS at 05:57

## 2020-12-26 RX ADMIN — CEFTRIAXONE SODIUM 2 G: 2 INJECTION, POWDER, FOR SOLUTION INTRAMUSCULAR; INTRAVENOUS at 18:13

## 2020-12-26 RX ADMIN — INSULIN LISPRO 6 UNITS: 100 INJECTION, SOLUTION INTRAVENOUS; SUBCUTANEOUS at 11:53

## 2020-12-26 RX ADMIN — ENOXAPARIN SODIUM 40 MG: 40 INJECTION SUBCUTANEOUS at 16:10

## 2020-12-26 RX ADMIN — OXYCODONE 5 MG: 5 TABLET ORAL at 15:11

## 2020-12-26 RX ADMIN — METOPROLOL TARTRATE 12.5 MG: 25 TABLET, FILM COATED ORAL at 09:01

## 2020-12-26 RX ADMIN — Medication 10 ML: at 21:34

## 2020-12-27 LAB
ALBUMIN SERPL-MCNC: 2.3 G/DL (ref 3.5–5.2)
ALBUMIN/GLOB SERPL: 0.8 G/DL
ALP SERPL-CCNC: 166 U/L (ref 39–117)
ALT SERPL W P-5'-P-CCNC: 64 U/L (ref 1–33)
ANION GAP SERPL CALCULATED.3IONS-SCNC: 8 MMOL/L (ref 5–15)
AST SERPL-CCNC: 58 U/L (ref 1–32)
BACTERIA SPEC AEROBE CULT: ABNORMAL
BASOPHILS # BLD AUTO: 0 10*3/MM3 (ref 0–0.2)
BASOPHILS NFR BLD AUTO: 0.4 % (ref 0–1.5)
BILIRUB SERPL-MCNC: 0.4 MG/DL (ref 0–1.2)
BUN SERPL-MCNC: 13 MG/DL (ref 6–20)
BUN/CREAT SERPL: 18.6 (ref 7–25)
CALCIUM SPEC-SCNC: 7.7 MG/DL (ref 8.6–10.5)
CHLORIDE SERPL-SCNC: 101 MMOL/L (ref 98–107)
CO2 SERPL-SCNC: 24 MMOL/L (ref 22–29)
CREAT SERPL-MCNC: 0.7 MG/DL (ref 0.57–1)
DEPRECATED RDW RBC AUTO: 56.9 FL (ref 37–54)
EOSINOPHIL # BLD AUTO: 0.2 10*3/MM3 (ref 0–0.4)
EOSINOPHIL NFR BLD AUTO: 1.7 % (ref 0.3–6.2)
ERYTHROCYTE [DISTWIDTH] IN BLOOD BY AUTOMATED COUNT: 17.2 % (ref 12.3–15.4)
GFR SERPL CREATININE-BSD FRML MDRD: 91 ML/MIN/1.73
GLOBULIN UR ELPH-MCNC: 3 GM/DL
GLUCOSE BLDC GLUCOMTR-MCNC: 138 MG/DL (ref 70–105)
GLUCOSE BLDC GLUCOMTR-MCNC: 141 MG/DL (ref 70–105)
GLUCOSE BLDC GLUCOMTR-MCNC: 186 MG/DL (ref 70–105)
GLUCOSE BLDC GLUCOMTR-MCNC: 211 MG/DL (ref 70–105)
GLUCOSE SERPL-MCNC: 141 MG/DL (ref 65–99)
GRAM STN SPEC: ABNORMAL
HCT VFR BLD AUTO: 34.5 % (ref 34–46.6)
HGB BLD-MCNC: 11.3 G/DL (ref 12–15.9)
LYMPHOCYTES # BLD AUTO: 1.9 10*3/MM3 (ref 0.7–3.1)
LYMPHOCYTES NFR BLD AUTO: 21.2 % (ref 19.6–45.3)
MAGNESIUM SERPL-MCNC: 2 MG/DL (ref 1.6–2.6)
MCH RBC QN AUTO: 31.2 PG (ref 26.6–33)
MCHC RBC AUTO-ENTMCNC: 32.9 G/DL (ref 31.5–35.7)
MCV RBC AUTO: 94.9 FL (ref 79–97)
MONOCYTES # BLD AUTO: 0.8 10*3/MM3 (ref 0.1–0.9)
MONOCYTES NFR BLD AUTO: 8.6 % (ref 5–12)
NEUTROPHILS NFR BLD AUTO: 6 10*3/MM3 (ref 1.7–7)
NEUTROPHILS NFR BLD AUTO: 68.1 % (ref 42.7–76)
NRBC BLD AUTO-RTO: 0.7 /100 WBC (ref 0–0.2)
PLATELET # BLD AUTO: 224 10*3/MM3 (ref 140–450)
PMV BLD AUTO: 9.3 FL (ref 6–12)
POTASSIUM SERPL-SCNC: 3 MMOL/L (ref 3.5–5.2)
PROT SERPL-MCNC: 5.3 G/DL (ref 6–8.5)
RBC # BLD AUTO: 3.63 10*6/MM3 (ref 3.77–5.28)
SODIUM SERPL-SCNC: 133 MMOL/L (ref 136–145)
WBC # BLD AUTO: 8.8 10*3/MM3 (ref 3.4–10.8)

## 2020-12-27 PROCEDURE — 83735 ASSAY OF MAGNESIUM: CPT | Performed by: STUDENT IN AN ORGANIZED HEALTH CARE EDUCATION/TRAINING PROGRAM

## 2020-12-27 PROCEDURE — 25010000002 ENOXAPARIN PER 10 MG: Performed by: STUDENT IN AN ORGANIZED HEALTH CARE EDUCATION/TRAINING PROGRAM

## 2020-12-27 PROCEDURE — 25010000002 CEFTRIAXONE PER 250 MG: Performed by: INTERNAL MEDICINE

## 2020-12-27 PROCEDURE — 99232 SBSQ HOSP IP/OBS MODERATE 35: CPT | Performed by: HOSPITALIST

## 2020-12-27 PROCEDURE — 82962 GLUCOSE BLOOD TEST: CPT

## 2020-12-27 PROCEDURE — 80053 COMPREHEN METABOLIC PANEL: CPT | Performed by: STUDENT IN AN ORGANIZED HEALTH CARE EDUCATION/TRAINING PROGRAM

## 2020-12-27 PROCEDURE — 63710000001 INSULIN GLARGINE PER 5 UNITS: Performed by: HOSPITALIST

## 2020-12-27 PROCEDURE — 63710000001 INSULIN LISPRO (HUMAN) PER 5 UNITS: Performed by: STUDENT IN AN ORGANIZED HEALTH CARE EDUCATION/TRAINING PROGRAM

## 2020-12-27 PROCEDURE — 99232 SBSQ HOSP IP/OBS MODERATE 35: CPT | Performed by: INTERNAL MEDICINE

## 2020-12-27 PROCEDURE — 85025 COMPLETE CBC W/AUTO DIFF WBC: CPT | Performed by: STUDENT IN AN ORGANIZED HEALTH CARE EDUCATION/TRAINING PROGRAM

## 2020-12-27 RX ORDER — NICOTINE 21 MG/24HR
1 PATCH, TRANSDERMAL 24 HOURS TRANSDERMAL
Status: DISCONTINUED | OUTPATIENT
Start: 2020-12-27 | End: 2020-12-29 | Stop reason: HOSPADM

## 2020-12-27 RX ADMIN — ENOXAPARIN SODIUM 40 MG: 40 INJECTION SUBCUTANEOUS at 15:56

## 2020-12-27 RX ADMIN — SODIUM CHLORIDE 125 ML/HR: 9 INJECTION, SOLUTION INTRAVENOUS at 04:02

## 2020-12-27 RX ADMIN — INSULIN LISPRO 2 UNITS: 100 INJECTION, SOLUTION INTRAVENOUS; SUBCUTANEOUS at 17:18

## 2020-12-27 RX ADMIN — Medication 10 ML: at 09:14

## 2020-12-27 RX ADMIN — SODIUM CHLORIDE 75 ML/HR: 9 INJECTION, SOLUTION INTRAVENOUS at 12:05

## 2020-12-27 RX ADMIN — CEFTRIAXONE SODIUM 2 G: 2 INJECTION, POWDER, FOR SOLUTION INTRAMUSCULAR; INTRAVENOUS at 17:17

## 2020-12-27 RX ADMIN — OXYCODONE 5 MG: 5 TABLET ORAL at 02:59

## 2020-12-27 RX ADMIN — METOPROLOL TARTRATE 12.5 MG: 25 TABLET, FILM COATED ORAL at 09:14

## 2020-12-27 RX ADMIN — INSULIN LISPRO 4 UNITS: 100 INJECTION, SOLUTION INTRAVENOUS; SUBCUTANEOUS at 12:04

## 2020-12-27 RX ADMIN — OXYCODONE 5 MG: 5 TABLET ORAL at 21:36

## 2020-12-27 RX ADMIN — METOPROLOL TARTRATE 12.5 MG: 25 TABLET, FILM COATED ORAL at 21:36

## 2020-12-27 RX ADMIN — Medication 1 PATCH: at 15:56

## 2020-12-27 RX ADMIN — Medication 5 MG: at 21:36

## 2020-12-27 RX ADMIN — INSULIN GLARGINE 5 UNITS: 100 INJECTION, SOLUTION SUBCUTANEOUS at 22:12

## 2020-12-27 RX ADMIN — OXYCODONE 5 MG: 5 TABLET ORAL at 09:14

## 2020-12-27 RX ADMIN — POTASSIUM CHLORIDE 40 MEQ: 1.5 POWDER, FOR SOLUTION ORAL at 21:36

## 2020-12-27 RX ADMIN — OXYCODONE 5 MG: 5 TABLET ORAL at 15:57

## 2020-12-27 RX ADMIN — Medication 10 ML: at 21:37

## 2020-12-27 RX ADMIN — POTASSIUM CHLORIDE 40 MEQ: 1500 TABLET, EXTENDED RELEASE ORAL at 09:14

## 2020-12-28 PROBLEM — R78.81 MRSA BACTEREMIA: Status: ACTIVE | Noted: 2020-12-28

## 2020-12-28 PROBLEM — B95.62 MRSA BACTEREMIA: Status: ACTIVE | Noted: 2020-12-28

## 2020-12-28 LAB
ALBUMIN SERPL-MCNC: 2 G/DL (ref 3.5–5.2)
ALBUMIN/GLOB SERPL: 0.6 G/DL
ALP SERPL-CCNC: 184 U/L (ref 39–117)
ALT SERPL W P-5'-P-CCNC: 51 U/L (ref 1–33)
ANION GAP SERPL CALCULATED.3IONS-SCNC: 8 MMOL/L (ref 5–15)
AST SERPL-CCNC: 49 U/L (ref 1–32)
BACTERIA SPEC AEROBE CULT: NORMAL
BASOPHILS # BLD AUTO: 0.1 10*3/MM3 (ref 0–0.2)
BASOPHILS NFR BLD AUTO: 0.5 % (ref 0–1.5)
BILIRUB SERPL-MCNC: 0.4 MG/DL (ref 0–1.2)
BUN SERPL-MCNC: 17 MG/DL (ref 6–20)
BUN/CREAT SERPL: 23 (ref 7–25)
CALCIUM SPEC-SCNC: 8.2 MG/DL (ref 8.6–10.5)
CHLORIDE SERPL-SCNC: 100 MMOL/L (ref 98–107)
CO2 SERPL-SCNC: 21 MMOL/L (ref 22–29)
CREAT SERPL-MCNC: 0.74 MG/DL (ref 0.57–1)
CRP SERPL-MCNC: 10.55 MG/DL (ref 0–0.5)
DEPRECATED RDW RBC AUTO: 55.1 FL (ref 37–54)
EOSINOPHIL # BLD AUTO: 0.1 10*3/MM3 (ref 0–0.4)
EOSINOPHIL NFR BLD AUTO: 1.1 % (ref 0.3–6.2)
ERYTHROCYTE [DISTWIDTH] IN BLOOD BY AUTOMATED COUNT: 16.9 % (ref 12.3–15.4)
ERYTHROCYTE [SEDIMENTATION RATE] IN BLOOD: 80 MM/HR (ref 0–20)
GFR SERPL CREATININE-BSD FRML MDRD: 85 ML/MIN/1.73
GLOBULIN UR ELPH-MCNC: 3.5 GM/DL
GLUCOSE BLDC GLUCOMTR-MCNC: 116 MG/DL (ref 70–105)
GLUCOSE BLDC GLUCOMTR-MCNC: 130 MG/DL (ref 70–105)
GLUCOSE BLDC GLUCOMTR-MCNC: 137 MG/DL (ref 70–105)
GLUCOSE BLDC GLUCOMTR-MCNC: 155 MG/DL (ref 70–105)
GLUCOSE SERPL-MCNC: 124 MG/DL (ref 65–99)
HCT VFR BLD AUTO: 32.2 % (ref 34–46.6)
HGB BLD-MCNC: 10.5 G/DL (ref 12–15.9)
LYMPHOCYTES # BLD AUTO: 2.3 10*3/MM3 (ref 0.7–3.1)
LYMPHOCYTES NFR BLD AUTO: 21.2 % (ref 19.6–45.3)
MAGNESIUM SERPL-MCNC: 2.2 MG/DL (ref 1.6–2.6)
MCH RBC QN AUTO: 30.7 PG (ref 26.6–33)
MCHC RBC AUTO-ENTMCNC: 32.5 G/DL (ref 31.5–35.7)
MCV RBC AUTO: 94.6 FL (ref 79–97)
MONOCYTES # BLD AUTO: 1 10*3/MM3 (ref 0.1–0.9)
MONOCYTES NFR BLD AUTO: 9.3 % (ref 5–12)
NEUTROPHILS NFR BLD AUTO: 67.9 % (ref 42.7–76)
NEUTROPHILS NFR BLD AUTO: 7.5 10*3/MM3 (ref 1.7–7)
NRBC BLD AUTO-RTO: 0.4 /100 WBC (ref 0–0.2)
PLATELET # BLD AUTO: 237 10*3/MM3 (ref 140–450)
PMV BLD AUTO: 8.9 FL (ref 6–12)
POTASSIUM SERPL-SCNC: 3.9 MMOL/L (ref 3.5–5.2)
PROT SERPL-MCNC: 5.5 G/DL (ref 6–8.5)
RBC # BLD AUTO: 3.4 10*6/MM3 (ref 3.77–5.28)
SODIUM SERPL-SCNC: 129 MMOL/L (ref 136–145)
WBC # BLD AUTO: 11 10*3/MM3 (ref 3.4–10.8)

## 2020-12-28 PROCEDURE — 86140 C-REACTIVE PROTEIN: CPT | Performed by: STUDENT IN AN ORGANIZED HEALTH CARE EDUCATION/TRAINING PROGRAM

## 2020-12-28 PROCEDURE — 82962 GLUCOSE BLOOD TEST: CPT

## 2020-12-28 PROCEDURE — 83735 ASSAY OF MAGNESIUM: CPT | Performed by: STUDENT IN AN ORGANIZED HEALTH CARE EDUCATION/TRAINING PROGRAM

## 2020-12-28 PROCEDURE — 97166 OT EVAL MOD COMPLEX 45 MIN: CPT

## 2020-12-28 PROCEDURE — 25010000002 CEFTRIAXONE PER 250 MG: Performed by: INTERNAL MEDICINE

## 2020-12-28 PROCEDURE — 97162 PT EVAL MOD COMPLEX 30 MIN: CPT

## 2020-12-28 PROCEDURE — 85025 COMPLETE CBC W/AUTO DIFF WBC: CPT | Performed by: INTERNAL MEDICINE

## 2020-12-28 PROCEDURE — 99231 SBSQ HOSP IP/OBS SF/LOW 25: CPT | Performed by: INTERNAL MEDICINE

## 2020-12-28 PROCEDURE — 25010000002 ENOXAPARIN PER 10 MG: Performed by: STUDENT IN AN ORGANIZED HEALTH CARE EDUCATION/TRAINING PROGRAM

## 2020-12-28 PROCEDURE — 99231 SBSQ HOSP IP/OBS SF/LOW 25: CPT | Performed by: HOSPITALIST

## 2020-12-28 PROCEDURE — 80053 COMPREHEN METABOLIC PANEL: CPT | Performed by: STUDENT IN AN ORGANIZED HEALTH CARE EDUCATION/TRAINING PROGRAM

## 2020-12-28 PROCEDURE — 85651 RBC SED RATE NONAUTOMATED: CPT | Performed by: STUDENT IN AN ORGANIZED HEALTH CARE EDUCATION/TRAINING PROGRAM

## 2020-12-28 PROCEDURE — 97110 THERAPEUTIC EXERCISES: CPT

## 2020-12-28 PROCEDURE — 99231 SBSQ HOSP IP/OBS SF/LOW 25: CPT | Performed by: THORACIC SURGERY (CARDIOTHORACIC VASCULAR SURGERY)

## 2020-12-28 RX ORDER — SODIUM CHLORIDE 9 MG/ML
250 INJECTION, SOLUTION INTRAVENOUS ONCE
Status: CANCELLED | OUTPATIENT
Start: 2020-12-29

## 2020-12-28 RX ADMIN — Medication 1 PATCH: at 08:13

## 2020-12-28 RX ADMIN — OXYCODONE 5 MG: 5 TABLET ORAL at 20:29

## 2020-12-28 RX ADMIN — ENOXAPARIN SODIUM 40 MG: 40 INJECTION SUBCUTANEOUS at 16:48

## 2020-12-28 RX ADMIN — Medication 10 ML: at 08:12

## 2020-12-28 RX ADMIN — CEFTRIAXONE SODIUM 2 G: 2 INJECTION, POWDER, FOR SOLUTION INTRAMUSCULAR; INTRAVENOUS at 17:26

## 2020-12-28 RX ADMIN — METOPROLOL TARTRATE 12.5 MG: 25 TABLET, FILM COATED ORAL at 20:29

## 2020-12-28 RX ADMIN — OXYCODONE 5 MG: 5 TABLET ORAL at 04:22

## 2020-12-28 RX ADMIN — MICONAZOLE NITRATE 1 APPLICATOR: 20 CREAM VAGINAL at 05:37

## 2020-12-28 RX ADMIN — OXYCODONE 5 MG: 5 TABLET ORAL at 16:48

## 2020-12-28 RX ADMIN — METOPROLOL TARTRATE 12.5 MG: 25 TABLET, FILM COATED ORAL at 08:12

## 2020-12-28 RX ADMIN — Medication 10 ML: at 20:30

## 2020-12-28 RX ADMIN — OXYCODONE 5 MG: 5 TABLET ORAL at 10:48

## 2020-12-29 VITALS
HEART RATE: 96 BPM | HEIGHT: 64 IN | TEMPERATURE: 98.2 F | BODY MASS INDEX: 37.56 KG/M2 | DIASTOLIC BLOOD PRESSURE: 96 MMHG | SYSTOLIC BLOOD PRESSURE: 153 MMHG | RESPIRATION RATE: 17 BRPM | OXYGEN SATURATION: 94 % | WEIGHT: 220.02 LBS

## 2020-12-29 PROBLEM — Z98.890 S/P DEBRIDEMENT: Status: ACTIVE | Noted: 2020-12-29

## 2020-12-29 PROBLEM — B95.61 MSSA BACTEREMIA: Status: ACTIVE | Noted: 2020-12-29

## 2020-12-29 PROBLEM — E87.1 HYPONATREMIA: Status: RESOLVED | Noted: 2020-12-22 | Resolved: 2020-12-29

## 2020-12-29 PROBLEM — R78.81 MSSA BACTEREMIA: Status: ACTIVE | Noted: 2020-12-29

## 2020-12-29 LAB
ALBUMIN SERPL-MCNC: 2.2 G/DL (ref 3.5–5.2)
ALBUMIN/GLOB SERPL: 0.7 G/DL
ALP SERPL-CCNC: 163 U/L (ref 39–117)
ALT SERPL W P-5'-P-CCNC: 37 U/L (ref 1–33)
ANION GAP SERPL CALCULATED.3IONS-SCNC: 9 MMOL/L (ref 5–15)
ANISOCYTOSIS BLD QL: ABNORMAL
AST SERPL-CCNC: 27 U/L (ref 1–32)
BACTERIA FLD CULT: NORMAL
BILIRUB SERPL-MCNC: 0.4 MG/DL (ref 0–1.2)
BUN SERPL-MCNC: 17 MG/DL (ref 6–20)
BUN/CREAT SERPL: 21.5 (ref 7–25)
CALCIUM SPEC-SCNC: 8 MG/DL (ref 8.6–10.5)
CHLORIDE SERPL-SCNC: 100 MMOL/L (ref 98–107)
CO2 SERPL-SCNC: 23 MMOL/L (ref 22–29)
CREAT SERPL-MCNC: 0.79 MG/DL (ref 0.57–1)
DEPRECATED RDW RBC AUTO: 56.9 FL (ref 37–54)
ERYTHROCYTE [DISTWIDTH] IN BLOOD BY AUTOMATED COUNT: 17.3 % (ref 12.3–15.4)
GFR SERPL CREATININE-BSD FRML MDRD: 79 ML/MIN/1.73
GLOBULIN UR ELPH-MCNC: 3 GM/DL
GLUCOSE BLDC GLUCOMTR-MCNC: 125 MG/DL (ref 70–105)
GLUCOSE SERPL-MCNC: 136 MG/DL (ref 65–99)
GRAM STN SPEC: NORMAL
GRAM STN SPEC: NORMAL
HCT VFR BLD AUTO: 32.4 % (ref 34–46.6)
HGB BLD-MCNC: 10.7 G/DL (ref 12–15.9)
LYMPHOCYTES # BLD MANUAL: 1.18 10*3/MM3 (ref 0.7–3.1)
LYMPHOCYTES NFR BLD MANUAL: 12 % (ref 19.6–45.3)
LYMPHOCYTES NFR BLD MANUAL: 9 % (ref 5–12)
MAGNESIUM SERPL-MCNC: 2 MG/DL (ref 1.6–2.6)
MCH RBC QN AUTO: 31.2 PG (ref 26.6–33)
MCHC RBC AUTO-ENTMCNC: 33.1 G/DL (ref 31.5–35.7)
MCV RBC AUTO: 94.1 FL (ref 79–97)
METAMYELOCYTES NFR BLD MANUAL: 1 % (ref 0–0)
MONOCYTES # BLD AUTO: 0.88 10*3/MM3 (ref 0.1–0.9)
NEUTROPHILS # BLD AUTO: 7.64 10*3/MM3 (ref 1.7–7)
NEUTROPHILS NFR BLD MANUAL: 75 % (ref 42.7–76)
NEUTS BAND NFR BLD MANUAL: 3 % (ref 0–5)
NRBC SPEC MANUAL: 1 /100 WBC (ref 0–0.2)
PLAT MORPH BLD: NORMAL
PLATELET # BLD AUTO: 267 10*3/MM3 (ref 140–450)
PMV BLD AUTO: 8.6 FL (ref 6–12)
POTASSIUM SERPL-SCNC: 3.6 MMOL/L (ref 3.5–5.2)
PROT SERPL-MCNC: 5.2 G/DL (ref 6–8.5)
RBC # BLD AUTO: 3.44 10*6/MM3 (ref 3.77–5.28)
SCAN SLIDE: NORMAL
SODIUM SERPL-SCNC: 132 MMOL/L (ref 136–145)
WBC # BLD AUTO: 9.8 10*3/MM3 (ref 3.4–10.8)
WBC MORPH BLD: NORMAL

## 2020-12-29 PROCEDURE — 82962 GLUCOSE BLOOD TEST: CPT

## 2020-12-29 PROCEDURE — 85025 COMPLETE CBC W/AUTO DIFF WBC: CPT | Performed by: STUDENT IN AN ORGANIZED HEALTH CARE EDUCATION/TRAINING PROGRAM

## 2020-12-29 PROCEDURE — 99238 HOSP IP/OBS DSCHRG MGMT 30/<: CPT | Performed by: HOSPITALIST

## 2020-12-29 PROCEDURE — 83735 ASSAY OF MAGNESIUM: CPT | Performed by: STUDENT IN AN ORGANIZED HEALTH CARE EDUCATION/TRAINING PROGRAM

## 2020-12-29 PROCEDURE — 97535 SELF CARE MNGMENT TRAINING: CPT

## 2020-12-29 PROCEDURE — 80053 COMPREHEN METABOLIC PANEL: CPT | Performed by: STUDENT IN AN ORGANIZED HEALTH CARE EDUCATION/TRAINING PROGRAM

## 2020-12-29 PROCEDURE — 25010000002 CEFTRIAXONE PER 250 MG: Performed by: INTERNAL MEDICINE

## 2020-12-29 PROCEDURE — 85007 BL SMEAR W/DIFF WBC COUNT: CPT | Performed by: STUDENT IN AN ORGANIZED HEALTH CARE EDUCATION/TRAINING PROGRAM

## 2020-12-29 PROCEDURE — 63710000001 DIPHENHYDRAMINE PER 50 MG: Performed by: INTERNAL MEDICINE

## 2020-12-29 RX ORDER — DIPHENHYDRAMINE HCL 25 MG
25 CAPSULE ORAL EVERY 6 HOURS PRN
Status: DISCONTINUED | OUTPATIENT
Start: 2020-12-29 | End: 2020-12-29 | Stop reason: HOSPADM

## 2020-12-29 RX ADMIN — OXYCODONE 5 MG: 5 TABLET ORAL at 08:14

## 2020-12-29 RX ADMIN — OXYCODONE 5 MG: 5 TABLET ORAL at 03:41

## 2020-12-29 RX ADMIN — Medication 10 ML: at 08:14

## 2020-12-29 RX ADMIN — CEFTRIAXONE SODIUM 2 G: 2 INJECTION, POWDER, FOR SOLUTION INTRAMUSCULAR; INTRAVENOUS at 10:32

## 2020-12-29 RX ADMIN — Medication 1 PATCH: at 08:15

## 2020-12-29 RX ADMIN — METOPROLOL TARTRATE 12.5 MG: 25 TABLET, FILM COATED ORAL at 08:14

## 2020-12-29 RX ADMIN — DIPHENHYDRAMINE HYDROCHLORIDE 25 MG: 25 CAPSULE ORAL at 05:23

## 2020-12-30 ENCOUNTER — NURSE TRIAGE (OUTPATIENT)
Dept: CALL CENTER | Facility: HOSPITAL | Age: 45
End: 2020-12-30

## 2020-12-30 ENCOUNTER — READMISSION MANAGEMENT (OUTPATIENT)
Dept: CALL CENTER | Facility: HOSPITAL | Age: 45
End: 2020-12-30

## 2020-12-30 ENCOUNTER — HOSPITAL ENCOUNTER (OUTPATIENT)
Dept: INFUSION THERAPY | Facility: HOSPITAL | Age: 45
Setting detail: INFUSION SERIES
Discharge: HOME | End: 2020-12-30

## 2020-12-30 VITALS
HEART RATE: 112 BPM | SYSTOLIC BLOOD PRESSURE: 143 MMHG | DIASTOLIC BLOOD PRESSURE: 94 MMHG | RESPIRATION RATE: 16 BRPM | TEMPERATURE: 97.1 F

## 2020-12-30 DIAGNOSIS — M00.9 SEPTIC ARTHRITIS OF AC JOINT (HCC): ICD-10-CM

## 2020-12-30 DIAGNOSIS — R78.81 MRSA BACTEREMIA: Primary | ICD-10-CM

## 2020-12-30 DIAGNOSIS — B95.62 MRSA BACTEREMIA: Primary | ICD-10-CM

## 2020-12-30 LAB
BACTERIA FLD CULT: NORMAL
BACTERIA SPEC ANAEROBE CULT: NORMAL
GRAM STN SPEC: NORMAL

## 2020-12-30 PROCEDURE — 36415 COLL VENOUS BLD VENIPUNCTURE: CPT

## 2020-12-30 PROCEDURE — 25010000002 CEFTRIAXONE PER 250 MG: Performed by: NURSE PRACTITIONER

## 2020-12-30 PROCEDURE — 96365 THER/PROPH/DIAG IV INF INIT: CPT

## 2020-12-30 RX ORDER — SODIUM CHLORIDE 9 MG/ML
250 INJECTION, SOLUTION INTRAVENOUS ONCE
Status: CANCELLED | OUTPATIENT
Start: 2020-12-30

## 2020-12-30 RX ADMIN — CEFTRIAXONE SODIUM 2 G: 2 INJECTION, POWDER, FOR SOLUTION INTRAMUSCULAR; INTRAVENOUS at 15:02

## 2020-12-30 NOTE — OUTREACH NOTE
Prep Survey      Responses   [facility]?  [facility]   Is LACE score < 7 ?  No   Emergency Room discharge w/ pulse ox?  No   Eligibility  Readm Mgmt   Discharge diagnosis  Severe sepsis, UTI   Does the patient have one of the following disease processes/diagnoses(primary or secondary)?  Sepsis   Does the patient have Home health ordered?  No   Is there a DME ordered?  No   Comments regarding appointments  Needs follow up scheduled   Prep survey completed?  Yes          Angela Cueva RN

## 2020-12-30 NOTE — TELEPHONE ENCOUNTER
"Patient was dc 12/29 , recent surgery on 12/24  On shoulder.  Was sent home without pain meds.  says been hurting all night. Given the number for the surgeon and if unable to  Contact will be going to ED    Reason for Disposition  • [1] SEVERE post-op pain (e.g., excruciating, pain scale 8-10) AND [2] not controlled with pain medications    Additional Information  • Negative: Sounds like a life-threatening emergency to the triager  • Negative: Chest pain  • Negative: Difficulty breathing  • Negative: Acting confused (e.g., disoriented, slurred speech) or excessively sleepy  • Negative: Surgical incision symptoms and questions  • Negative: [1] Discomfort (pain, burning or stinging) when passing urine AND [2] male  • Negative: [1] Discomfort (pain, burning or stinging) when passing urine AND [2] female  • Negative: Constipation  • Negative: New or worsening leg (calf, thigh) pain  • Negative: New or worsening leg swelling  • Negative: Dizziness is severe, or persists > 24 hours after surgery  • Negative: Pain, redness, swelling, or pus at IV Site  • Negative: Symptoms arising from use of a urinary catheter (Guerrero or Coude)  • Negative: Cast problems or questions  • Negative: Medication question  • Negative: [1] Widespread rash AND [2] bright red, sunburn-like  • Negative: [1] SEVERE headache AND [2] after spinal (epidural) anesthesia  • Negative: [1] Vomiting AND [2] persists > 4 hours  • Negative: [1] Vomiting AND [2] abdomen looks much more swollen than usual  • Negative: [1] Drinking very little AND [2] dehydration suspected (e.g., no urine > 12 hours, very dry mouth, very lightheaded)  • Negative: Patient sounds very sick or weak to the triager  • Negative: Sounds like a serious complication to the triager  • Negative: Fever > 100.4 F (38.0 C)    Answer Assessment - Initial Assessment Questions  1. SYMPTOM: \"What's the main symptom you're concerned about?\" (e.g., pain, fever, vomiting)      pain  2. ONSET: " "\"When did 12/24  start?\"      12/24  3. SURGERY: \"What surgery was performed?\"      shoulder  4. DATE of SURGERY: \"When was surgery performed?\"       12/24  5. ANESTHESIA: \" What type of anesthesia did you have?\" (e.g., general, spinal, epidural, local)      general  6. PAIN: \"Is there any pain?\" If so, ask: \"How bad is it?\"  (Scale 1-10; or mild, moderate, severe)      yes  7. FEVER: \"Do you have a fever?\" If so, ask: \"What is your temperature, how was it measured, and when did it start?\"      no  8. VOMITING: \"Is there any vomiting?\" If yes, ask: \"How many times?\"      no  9. BLEEDING: \"Is there any bleeding?\" If so, ask: \"How much?\" and \"Where?\"      no  10. OTHER SYMPTOMS: \"Do you have any other symptoms?\" (e.g., drainage from wound, painful urination, constipation)        no    Protocols used: POST-OP SYMPTOMS AND QUESTIONS-ADULT-AH      "

## 2020-12-31 ENCOUNTER — APPOINTMENT (OUTPATIENT)
Dept: GENERAL RADIOLOGY | Facility: HOSPITAL | Age: 45
End: 2020-12-31

## 2020-12-31 ENCOUNTER — APPOINTMENT (OUTPATIENT)
Dept: CT IMAGING | Facility: HOSPITAL | Age: 45
End: 2020-12-31

## 2020-12-31 ENCOUNTER — HOSPITAL ENCOUNTER (INPATIENT)
Facility: HOSPITAL | Age: 45
LOS: 6 days | Discharge: HOME | End: 2021-01-06
Attending: INTERNAL MEDICINE | Admitting: HOSPITALIST

## 2020-12-31 ENCOUNTER — HOSPITAL ENCOUNTER (OUTPATIENT)
Dept: INFUSION THERAPY | Facility: HOSPITAL | Age: 45
Setting detail: INFUSION SERIES
Discharge: HOME | End: 2020-12-31

## 2020-12-31 VITALS
DIASTOLIC BLOOD PRESSURE: 94 MMHG | TEMPERATURE: 97.8 F | RESPIRATION RATE: 16 BRPM | SYSTOLIC BLOOD PRESSURE: 140 MMHG | HEART RATE: 111 BPM

## 2020-12-31 DIAGNOSIS — B95.62 MRSA BACTEREMIA: Primary | ICD-10-CM

## 2020-12-31 DIAGNOSIS — R78.81 MRSA BACTEREMIA: Primary | ICD-10-CM

## 2020-12-31 DIAGNOSIS — R60.9 EDEMA, UNSPECIFIED TYPE: ICD-10-CM

## 2020-12-31 DIAGNOSIS — M00.9 SEPTIC ARTHRITIS OF AC JOINT (HCC): ICD-10-CM

## 2020-12-31 DIAGNOSIS — I50.9 CONGESTIVE HEART FAILURE, UNSPECIFIED HF CHRONICITY, UNSPECIFIED HEART FAILURE TYPE (HCC): Primary | ICD-10-CM

## 2020-12-31 DIAGNOSIS — J18.9 PNEUMONIA DUE TO INFECTIOUS ORGANISM, UNSPECIFIED LATERALITY, UNSPECIFIED PART OF LUNG: ICD-10-CM

## 2020-12-31 LAB
ALBUMIN SERPL-MCNC: 3 G/DL (ref 3.5–5.2)
ALBUMIN/GLOB SERPL: 0.9 G/DL
ALP SERPL-CCNC: 136 U/L (ref 39–117)
ALT SERPL W P-5'-P-CCNC: 26 U/L (ref 1–33)
ANION GAP SERPL CALCULATED.3IONS-SCNC: 9 MMOL/L (ref 5–15)
AST SERPL-CCNC: 25 U/L (ref 1–32)
BASOPHILS # BLD AUTO: 0.1 10*3/MM3 (ref 0–0.2)
BASOPHILS NFR BLD AUTO: 0.7 % (ref 0–1.5)
BILIRUB SERPL-MCNC: 0.5 MG/DL (ref 0–1.2)
BUN SERPL-MCNC: 16 MG/DL (ref 6–20)
BUN/CREAT SERPL: 11.4 (ref 7–25)
CALCIUM SPEC-SCNC: 8.5 MG/DL (ref 8.6–10.5)
CHLORIDE SERPL-SCNC: 101 MMOL/L (ref 98–107)
CO2 SERPL-SCNC: 25 MMOL/L (ref 22–29)
CREAT SERPL-MCNC: 1.4 MG/DL (ref 0.57–1)
DEPRECATED RDW RBC AUTO: 57.3 FL (ref 37–54)
EOSINOPHIL # BLD AUTO: 0.1 10*3/MM3 (ref 0–0.4)
EOSINOPHIL NFR BLD AUTO: 1.7 % (ref 0.3–6.2)
ERYTHROCYTE [DISTWIDTH] IN BLOOD BY AUTOMATED COUNT: 17.2 % (ref 12.3–15.4)
GFR SERPL CREATININE-BSD FRML MDRD: 41 ML/MIN/1.73
GLOBULIN UR ELPH-MCNC: 3.3 GM/DL
GLUCOSE SERPL-MCNC: 104 MG/DL (ref 65–99)
HCT VFR BLD AUTO: 34.2 % (ref 34–46.6)
HGB BLD-MCNC: 11.2 G/DL (ref 12–15.9)
LYMPHOCYTES # BLD AUTO: 1.5 10*3/MM3 (ref 0.7–3.1)
LYMPHOCYTES NFR BLD AUTO: 18.9 % (ref 19.6–45.3)
MCH RBC QN AUTO: 30.7 PG (ref 26.6–33)
MCHC RBC AUTO-ENTMCNC: 32.7 G/DL (ref 31.5–35.7)
MCV RBC AUTO: 93.7 FL (ref 79–97)
MONOCYTES # BLD AUTO: 0.7 10*3/MM3 (ref 0.1–0.9)
MONOCYTES NFR BLD AUTO: 8.9 % (ref 5–12)
NEUTROPHILS NFR BLD AUTO: 5.6 10*3/MM3 (ref 1.7–7)
NEUTROPHILS NFR BLD AUTO: 69.8 % (ref 42.7–76)
NRBC BLD AUTO-RTO: 0 /100 WBC (ref 0–0.2)
NT-PROBNP SERPL-MCNC: 8937 PG/ML (ref 0–450)
PLATELET # BLD AUTO: 337 10*3/MM3 (ref 140–450)
PMV BLD AUTO: 7.8 FL (ref 6–12)
POTASSIUM SERPL-SCNC: 4 MMOL/L (ref 3.5–5.2)
PROT SERPL-MCNC: 6.3 G/DL (ref 6–8.5)
RBC # BLD AUTO: 3.65 10*6/MM3 (ref 3.77–5.28)
SODIUM SERPL-SCNC: 135 MMOL/L (ref 136–145)
TROPONIN T SERPL-MCNC: <0.01 NG/ML (ref 0–0.03)
WBC # BLD AUTO: 8 10*3/MM3 (ref 3.4–10.8)

## 2020-12-31 PROCEDURE — 96365 THER/PROPH/DIAG IV INF INIT: CPT

## 2020-12-31 PROCEDURE — 84145 PROCALCITONIN (PCT): CPT | Performed by: NURSE PRACTITIONER

## 2020-12-31 PROCEDURE — 99284 EMERGENCY DEPT VISIT MOD MDM: CPT

## 2020-12-31 PROCEDURE — 36415 COLL VENOUS BLD VENIPUNCTURE: CPT

## 2020-12-31 PROCEDURE — 84484 ASSAY OF TROPONIN QUANT: CPT | Performed by: NURSE PRACTITIONER

## 2020-12-31 PROCEDURE — 87040 BLOOD CULTURE FOR BACTERIA: CPT | Performed by: NURSE PRACTITIONER

## 2020-12-31 PROCEDURE — 71045 X-RAY EXAM CHEST 1 VIEW: CPT

## 2020-12-31 PROCEDURE — 93005 ELECTROCARDIOGRAM TRACING: CPT | Performed by: NURSE PRACTITIONER

## 2020-12-31 PROCEDURE — 83880 ASSAY OF NATRIURETIC PEPTIDE: CPT | Performed by: NURSE PRACTITIONER

## 2020-12-31 PROCEDURE — 85025 COMPLETE CBC W/AUTO DIFF WBC: CPT | Performed by: NURSE PRACTITIONER

## 2020-12-31 PROCEDURE — 25010000002 CEFTRIAXONE PER 250 MG: Performed by: NURSE PRACTITIONER

## 2020-12-31 PROCEDURE — 0 IOPAMIDOL PER 1 ML: Performed by: NURSE PRACTITIONER

## 2020-12-31 PROCEDURE — 71275 CT ANGIOGRAPHY CHEST: CPT

## 2020-12-31 PROCEDURE — 80053 COMPREHEN METABOLIC PANEL: CPT | Performed by: NURSE PRACTITIONER

## 2020-12-31 PROCEDURE — 99222 1ST HOSP IP/OBS MODERATE 55: CPT | Performed by: NURSE PRACTITIONER

## 2020-12-31 RX ORDER — SODIUM CHLORIDE 0.9 % (FLUSH) 0.9 %
10 SYRINGE (ML) INJECTION EVERY 12 HOURS SCHEDULED
Status: DISCONTINUED | OUTPATIENT
Start: 2020-12-31 | End: 2021-01-06 | Stop reason: HOSPADM

## 2020-12-31 RX ORDER — ACETAMINOPHEN 650 MG/1
650 SUPPOSITORY RECTAL EVERY 4 HOURS PRN
Status: DISCONTINUED | OUTPATIENT
Start: 2020-12-31 | End: 2021-01-06 | Stop reason: HOSPADM

## 2020-12-31 RX ORDER — SODIUM CHLORIDE 0.9 % (FLUSH) 0.9 %
10 SYRINGE (ML) INJECTION AS NEEDED
Status: DISCONTINUED | OUTPATIENT
Start: 2020-12-31 | End: 2021-01-06 | Stop reason: HOSPADM

## 2020-12-31 RX ORDER — ONDANSETRON 4 MG/1
4 TABLET, FILM COATED ORAL EVERY 6 HOURS PRN
Status: DISCONTINUED | OUTPATIENT
Start: 2020-12-31 | End: 2021-01-06 | Stop reason: HOSPADM

## 2020-12-31 RX ORDER — ONDANSETRON 2 MG/ML
4 INJECTION INTRAMUSCULAR; INTRAVENOUS EVERY 6 HOURS PRN
Status: DISCONTINUED | OUTPATIENT
Start: 2020-12-31 | End: 2021-01-06 | Stop reason: HOSPADM

## 2020-12-31 RX ORDER — FUROSEMIDE 10 MG/ML
40 INJECTION INTRAMUSCULAR; INTRAVENOUS ONCE
Status: COMPLETED | OUTPATIENT
Start: 2020-12-31 | End: 2021-01-01

## 2020-12-31 RX ORDER — SODIUM CHLORIDE 9 MG/ML
250 INJECTION, SOLUTION INTRAVENOUS ONCE
Status: CANCELLED | OUTPATIENT
Start: 2020-12-31

## 2020-12-31 RX ORDER — ACETAMINOPHEN 160 MG/5ML
650 SOLUTION ORAL EVERY 4 HOURS PRN
Status: DISCONTINUED | OUTPATIENT
Start: 2020-12-31 | End: 2021-01-06 | Stop reason: HOSPADM

## 2020-12-31 RX ORDER — ACETAMINOPHEN 325 MG/1
650 TABLET ORAL EVERY 4 HOURS PRN
Status: DISCONTINUED | OUTPATIENT
Start: 2020-12-31 | End: 2021-01-06 | Stop reason: HOSPADM

## 2020-12-31 RX ADMIN — IOPAMIDOL 69 ML: 755 INJECTION, SOLUTION INTRAVENOUS at 22:34

## 2020-12-31 RX ADMIN — CEFTRIAXONE SODIUM 2 G: 2 INJECTION, POWDER, FOR SOLUTION INTRAMUSCULAR; INTRAVENOUS at 14:38

## 2021-01-01 ENCOUNTER — APPOINTMENT (OUTPATIENT)
Dept: CARDIOLOGY | Facility: HOSPITAL | Age: 46
End: 2021-01-01

## 2021-01-01 ENCOUNTER — READMISSION MANAGEMENT (OUTPATIENT)
Dept: CALL CENTER | Facility: HOSPITAL | Age: 46
End: 2021-01-01

## 2021-01-01 ENCOUNTER — TELEPHONE (OUTPATIENT)
Dept: INFUSION THERAPY | Facility: HOSPITAL | Age: 46
End: 2021-01-01

## 2021-01-01 ENCOUNTER — APPOINTMENT (OUTPATIENT)
Dept: INFUSION THERAPY | Facility: HOSPITAL | Age: 46
End: 2021-01-01

## 2021-01-01 PROBLEM — I50.9 CONGESTIVE HEART FAILURE: Status: ACTIVE | Noted: 2021-01-01

## 2021-01-01 LAB
ANION GAP SERPL CALCULATED.3IONS-SCNC: 9 MMOL/L (ref 5–15)
B PARAPERT DNA SPEC QL NAA+PROBE: NOT DETECTED
B PERT DNA SPEC QL NAA+PROBE: NOT DETECTED
BACTERIA SPEC AEROBE CULT: ABNORMAL
BACTERIA UR QL AUTO: ABNORMAL /HPF
BASOPHILS # BLD AUTO: 0 10*3/MM3 (ref 0–0.2)
BASOPHILS NFR BLD AUTO: 0.5 % (ref 0–1.5)
BH CV UPPER VENOUS LEFT BASILIC FOREARM COLOR: 1
BH CV UPPER VENOUS LEFT BASILIC FOREARM COMPRESS: NORMAL
BH CV UPPER VENOUS LEFT BASILIC FOREARM THROMBUS: NORMAL
BH CV UPPER VENOUS LEFT BASILIC UPPER COLOR: 1
BH CV UPPER VENOUS LEFT BASILIC UPPER COMPRESS: NORMAL
BH CV UPPER VENOUS LEFT BASILIC UPPER THROMBUS: NORMAL
BH CV UPPER VENOUS LEFT BRACHIAL COMPRESS: NORMAL
BH CV UPPER VENOUS LEFT CEPHALIC FOREARM COLOR: 1
BH CV UPPER VENOUS LEFT CEPHALIC FOREARM COMPRESS: NORMAL
BH CV UPPER VENOUS LEFT CEPHALIC FOREARM THROMBUS: NORMAL
BH CV UPPER VENOUS LEFT CEPHALIC UPPER COLOR: 1
BH CV UPPER VENOUS LEFT CEPHALIC UPPER COMPRESS: NORMAL
BH CV UPPER VENOUS LEFT CEPHALIC UPPER THROMBUS: NORMAL
BH CV UPPER VENOUS LEFT INTERNAL JUGULAR AUGMENT: NORMAL
BH CV UPPER VENOUS LEFT INTERNAL JUGULAR COMPETENT: NORMAL
BH CV UPPER VENOUS LEFT INTERNAL JUGULAR COMPRESS: NORMAL
BH CV UPPER VENOUS LEFT INTERNAL JUGULAR PHASIC: NORMAL
BH CV UPPER VENOUS LEFT INTERNAL JUGULAR SPONT: NORMAL
BH CV UPPER VENOUS LEFT MED CUBITAL COLOR: 1
BH CV UPPER VENOUS LEFT MED CUBITAL COMPRESS: NORMAL
BH CV UPPER VENOUS LEFT MED CUBITAL THROMBUS: NORMAL
BH CV UPPER VENOUS LEFT RADIAL COMPRESS: NORMAL
BH CV UPPER VENOUS LEFT SUBCLAVIAN AUGMENT: NORMAL
BH CV UPPER VENOUS LEFT SUBCLAVIAN COMPETENT: NORMAL
BH CV UPPER VENOUS LEFT SUBCLAVIAN PHASIC: NORMAL
BH CV UPPER VENOUS LEFT SUBCLAVIAN SPONT: NORMAL
BH CV UPPER VENOUS LEFT ULNAR COMPRESS: NORMAL
BH CV UPPER VENOUS RIGHT INTERNAL JUGULAR AUGMENT: NORMAL
BH CV UPPER VENOUS RIGHT INTERNAL JUGULAR COMPETENT: NORMAL
BH CV UPPER VENOUS RIGHT INTERNAL JUGULAR COMPRESS: NORMAL
BH CV UPPER VENOUS RIGHT INTERNAL JUGULAR PHASIC: NORMAL
BH CV UPPER VENOUS RIGHT INTERNAL JUGULAR SPONT: NORMAL
BH CV UPPER VENOUS RIGHT SUBCLAVIAN AUGMENT: NORMAL
BH CV UPPER VENOUS RIGHT SUBCLAVIAN COMPETENT: NORMAL
BH CV UPPER VENOUS RIGHT SUBCLAVIAN COMPRESS: NORMAL
BH CV UPPER VENOUS RIGHT SUBCLAVIAN PHASIC: NORMAL
BH CV UPPER VENOUS RIGHT SUBCLAVIAN SPONT: NORMAL
BILIRUB UR QL STRIP: NEGATIVE
BUN SERPL-MCNC: 15 MG/DL (ref 6–20)
BUN/CREAT SERPL: 10.6 (ref 7–25)
C PNEUM DNA NPH QL NAA+NON-PROBE: NOT DETECTED
CALCIUM SPEC-SCNC: 8.1 MG/DL (ref 8.6–10.5)
CHLORIDE SERPL-SCNC: 101 MMOL/L (ref 98–107)
CK SERPL-CCNC: 100 U/L (ref 20–180)
CLARITY UR: ABNORMAL
CO2 SERPL-SCNC: 25 MMOL/L (ref 22–29)
COLOR UR: YELLOW
CREAT SERPL-MCNC: 1.42 MG/DL (ref 0.57–1)
CRP SERPL-MCNC: 7.75 MG/DL (ref 0–0.5)
D-LACTATE SERPL-SCNC: 0.9 MMOL/L (ref 0.5–2)
DEPRECATED RDW RBC AUTO: 56.9 FL (ref 37–54)
EOSINOPHIL # BLD AUTO: 0.1 10*3/MM3 (ref 0–0.4)
EOSINOPHIL NFR BLD AUTO: 1.6 % (ref 0.3–6.2)
ERYTHROCYTE [DISTWIDTH] IN BLOOD BY AUTOMATED COUNT: 17.3 % (ref 12.3–15.4)
FLUAV SUBTYP SPEC NAA+PROBE: NOT DETECTED
FLUBV RNA ISLT QL NAA+PROBE: NOT DETECTED
GFR SERPL CREATININE-BSD FRML MDRD: 40 ML/MIN/1.73
GLUCOSE SERPL-MCNC: 102 MG/DL (ref 65–99)
GLUCOSE UR STRIP-MCNC: NEGATIVE MG/DL
GRAM STN SPEC: ABNORMAL
GRAM STN SPEC: ABNORMAL
GRAN CASTS URNS QL MICRO: ABNORMAL /LPF
HADV DNA SPEC NAA+PROBE: NOT DETECTED
HCG SERPL QL: NEGATIVE
HCOV 229E RNA SPEC QL NAA+PROBE: NOT DETECTED
HCOV HKU1 RNA SPEC QL NAA+PROBE: NOT DETECTED
HCOV NL63 RNA SPEC QL NAA+PROBE: NOT DETECTED
HCOV OC43 RNA SPEC QL NAA+PROBE: NOT DETECTED
HCT VFR BLD AUTO: 31 % (ref 34–46.6)
HGB BLD-MCNC: 10.2 G/DL (ref 12–15.9)
HGB UR QL STRIP.AUTO: ABNORMAL
HMPV RNA NPH QL NAA+NON-PROBE: NOT DETECTED
HPIV1 RNA SPEC QL NAA+PROBE: NOT DETECTED
HPIV2 RNA SPEC QL NAA+PROBE: NOT DETECTED
HPIV3 RNA NPH QL NAA+PROBE: NOT DETECTED
HPIV4 P GENE NPH QL NAA+PROBE: NOT DETECTED
HYALINE CASTS UR QL AUTO: ABNORMAL /LPF
ISOLATED FROM: ABNORMAL
KETONES UR QL STRIP: NEGATIVE
LEUKOCYTE ESTERASE UR QL STRIP.AUTO: ABNORMAL
LYMPHOCYTES # BLD AUTO: 1.1 10*3/MM3 (ref 0.7–3.1)
LYMPHOCYTES NFR BLD AUTO: 15.7 % (ref 19.6–45.3)
M PNEUMO IGG SER IA-ACNC: NOT DETECTED
MCH RBC QN AUTO: 30.6 PG (ref 26.6–33)
MCHC RBC AUTO-ENTMCNC: 32.8 G/DL (ref 31.5–35.7)
MCV RBC AUTO: 93.4 FL (ref 79–97)
MONOCYTES # BLD AUTO: 0.8 10*3/MM3 (ref 0.1–0.9)
MONOCYTES NFR BLD AUTO: 10.9 % (ref 5–12)
NEUTROPHILS NFR BLD AUTO: 5 10*3/MM3 (ref 1.7–7)
NEUTROPHILS NFR BLD AUTO: 71.3 % (ref 42.7–76)
NITRITE UR QL STRIP: NEGATIVE
NRBC BLD AUTO-RTO: 0 /100 WBC (ref 0–0.2)
NT-PROBNP SERPL-MCNC: 7980 PG/ML (ref 0–450)
PH UR STRIP.AUTO: <=5 [PH] (ref 5–8)
PLATELET # BLD AUTO: 299 10*3/MM3 (ref 140–450)
PMV BLD AUTO: 7.4 FL (ref 6–12)
POTASSIUM SERPL-SCNC: 4.1 MMOL/L (ref 3.5–5.2)
PROCALCITONIN SERPL-MCNC: 0.25 NG/ML (ref 0–0.25)
PROT UR QL STRIP: ABNORMAL
RBC # BLD AUTO: 3.32 10*6/MM3 (ref 3.77–5.28)
RBC # UR: ABNORMAL /HPF
REF LAB TEST METHOD: ABNORMAL
RHINOVIRUS RNA SPEC NAA+PROBE: NOT DETECTED
RSV RNA NPH QL NAA+NON-PROBE: NOT DETECTED
SARS-COV-2 RNA NPH QL NAA+NON-PROBE: NOT DETECTED
SODIUM SERPL-SCNC: 135 MMOL/L (ref 136–145)
SP GR UR STRIP: 1.02 (ref 1–1.03)
SQUAMOUS #/AREA URNS HPF: ABNORMAL /HPF
TROPONIN T SERPL-MCNC: <0.01 NG/ML (ref 0–0.03)
TSH SERPL DL<=0.05 MIU/L-ACNC: 1.9 UIU/ML (ref 0.27–4.2)
UROBILINOGEN UR QL STRIP: ABNORMAL
WBC # BLD AUTO: 7.1 10*3/MM3 (ref 3.4–10.8)
WBC UR QL AUTO: ABNORMAL /HPF

## 2021-01-01 PROCEDURE — 25010000002 VANCOMYCIN 10 G RECONSTITUTED SOLUTION: Performed by: NURSE PRACTITIONER

## 2021-01-01 PROCEDURE — 0202U NFCT DS 22 TRGT SARS-COV-2: CPT | Performed by: NURSE PRACTITIONER

## 2021-01-01 PROCEDURE — 87086 URINE CULTURE/COLONY COUNT: CPT | Performed by: INTERNAL MEDICINE

## 2021-01-01 PROCEDURE — 84484 ASSAY OF TROPONIN QUANT: CPT | Performed by: NURSE PRACTITIONER

## 2021-01-01 PROCEDURE — 83605 ASSAY OF LACTIC ACID: CPT

## 2021-01-01 PROCEDURE — 25010000002 LINEZOLID 600 MG/300ML SOLUTION: Performed by: NURSE PRACTITIONER

## 2021-01-01 PROCEDURE — 25010000002 ENOXAPARIN PER 10 MG: Performed by: NURSE PRACTITIONER

## 2021-01-01 PROCEDURE — 86140 C-REACTIVE PROTEIN: CPT | Performed by: INTERNAL MEDICINE

## 2021-01-01 PROCEDURE — 84443 ASSAY THYROID STIM HORMONE: CPT | Performed by: NURSE PRACTITIONER

## 2021-01-01 PROCEDURE — 80048 BASIC METABOLIC PNL TOTAL CA: CPT | Performed by: NURSE PRACTITIONER

## 2021-01-01 PROCEDURE — 83880 ASSAY OF NATRIURETIC PEPTIDE: CPT | Performed by: NURSE PRACTITIONER

## 2021-01-01 PROCEDURE — G0378 HOSPITAL OBSERVATION PER HR: HCPCS

## 2021-01-01 PROCEDURE — 84703 CHORIONIC GONADOTROPIN ASSAY: CPT | Performed by: INTERNAL MEDICINE

## 2021-01-01 PROCEDURE — 25010000002 CEFEPIME PER 500 MG: Performed by: NURSE PRACTITIONER

## 2021-01-01 PROCEDURE — 85025 COMPLETE CBC W/AUTO DIFF WBC: CPT | Performed by: NURSE PRACTITIONER

## 2021-01-01 PROCEDURE — 25010000002 FUROSEMIDE PER 20 MG: Performed by: NURSE PRACTITIONER

## 2021-01-01 PROCEDURE — 87040 BLOOD CULTURE FOR BACTERIA: CPT | Performed by: NURSE PRACTITIONER

## 2021-01-01 PROCEDURE — 25010000002 FUROSEMIDE PER 20 MG: Performed by: INTERNAL MEDICINE

## 2021-01-01 PROCEDURE — 25010000002 DAPTOMYCIN PER 1 MG: Performed by: NURSE PRACTITIONER

## 2021-01-01 PROCEDURE — 99233 SBSQ HOSP IP/OBS HIGH 50: CPT | Performed by: INTERNAL MEDICINE

## 2021-01-01 PROCEDURE — 93971 EXTREMITY STUDY: CPT

## 2021-01-01 PROCEDURE — 81001 URINALYSIS AUTO W/SCOPE: CPT | Performed by: INTERNAL MEDICINE

## 2021-01-01 PROCEDURE — 82550 ASSAY OF CK (CPK): CPT | Performed by: NURSE PRACTITIONER

## 2021-01-01 RX ORDER — VANCOMYCIN 1.75 GRAM/500 ML IN 0.9 % SODIUM CHLORIDE INTRAVENOUS
1750 ONCE
Status: COMPLETED | OUTPATIENT
Start: 2021-01-01 | End: 2021-01-01

## 2021-01-01 RX ORDER — FUROSEMIDE 10 MG/ML
20 INJECTION INTRAMUSCULAR; INTRAVENOUS
Status: DISCONTINUED | OUTPATIENT
Start: 2021-01-01 | End: 2021-01-04

## 2021-01-01 RX ORDER — DIPHENHYDRAMINE HYDROCHLORIDE 50 MG/ML
25 INJECTION INTRAMUSCULAR; INTRAVENOUS EVERY 6 HOURS PRN
Status: DISCONTINUED | OUTPATIENT
Start: 2021-01-01 | End: 2021-01-06 | Stop reason: HOSPADM

## 2021-01-01 RX ORDER — ASPIRIN 325 MG
325 TABLET, DELAYED RELEASE (ENTERIC COATED) ORAL DAILY
Status: DISCONTINUED | OUTPATIENT
Start: 2021-01-01 | End: 2021-01-06 | Stop reason: HOSPADM

## 2021-01-01 RX ORDER — OXYCODONE HYDROCHLORIDE 5 MG/1
5 TABLET ORAL EVERY 4 HOURS PRN
Status: DISCONTINUED | OUTPATIENT
Start: 2021-01-01 | End: 2021-01-06 | Stop reason: HOSPADM

## 2021-01-01 RX ORDER — DIPHENHYDRAMINE HYDROCHLORIDE, ZINC ACETATE 2; .1 G/100G; G/100G
CREAM TOPICAL 3 TIMES DAILY PRN
Status: DISCONTINUED | OUTPATIENT
Start: 2021-01-01 | End: 2021-01-06 | Stop reason: HOSPADM

## 2021-01-01 RX ORDER — LINEZOLID 2 MG/ML
600 INJECTION, SOLUTION INTRAVENOUS EVERY 12 HOURS
Status: DISCONTINUED | OUTPATIENT
Start: 2021-01-01 | End: 2021-01-06

## 2021-01-01 RX ADMIN — CEFEPIME HYDROCHLORIDE 2 G: 2 INJECTION, POWDER, FOR SOLUTION INTRAVENOUS at 03:19

## 2021-01-01 RX ADMIN — SODIUM CHLORIDE 1000 ML: 9 INJECTION, SOLUTION INTRAVENOUS at 01:40

## 2021-01-01 RX ADMIN — FUROSEMIDE 20 MG: 10 INJECTION, SOLUTION INTRAMUSCULAR; INTRAVENOUS at 10:35

## 2021-01-01 RX ADMIN — ENOXAPARIN SODIUM 100 MG: 100 INJECTION SUBCUTANEOUS at 03:16

## 2021-01-01 RX ADMIN — FUROSEMIDE 20 MG: 10 INJECTION, SOLUTION INTRAMUSCULAR; INTRAVENOUS at 17:14

## 2021-01-01 RX ADMIN — VANCOMYCIN HYDROCHLORIDE 1750 MG: 10 INJECTION, POWDER, LYOPHILIZED, FOR SOLUTION INTRAVENOUS at 04:20

## 2021-01-01 RX ADMIN — ASPIRIN 325 MG: 325 TABLET ORAL at 03:16

## 2021-01-01 RX ADMIN — OXYCODONE 5 MG: 5 TABLET ORAL at 11:54

## 2021-01-01 RX ADMIN — FUROSEMIDE 40 MG: 10 INJECTION, SOLUTION INTRAMUSCULAR; INTRAVENOUS at 00:17

## 2021-01-01 RX ADMIN — OXYCODONE 5 MG: 5 TABLET ORAL at 19:58

## 2021-01-01 RX ADMIN — LINEZOLID 600 MG: 600 INJECTION, SOLUTION INTRAVENOUS at 17:46

## 2021-01-01 RX ADMIN — Medication 10 ML: at 00:18

## 2021-01-01 RX ADMIN — OXYCODONE 5 MG: 5 TABLET ORAL at 15:51

## 2021-01-01 RX ADMIN — DAPTOMYCIN 450 MG: 500 INJECTION, POWDER, LYOPHILIZED, FOR SOLUTION INTRAVENOUS at 17:08

## 2021-01-01 RX ADMIN — Medication 10 ML: at 10:35

## 2021-01-01 RX ADMIN — Medication 10 ML: at 20:05

## 2021-01-01 NOTE — PROGRESS NOTES
"Pharmacy Antimicrobial Dosing Service    Subjective:  Radha Curtis is a 45 y.o.female admitted with cough and sputum production as well as swelling to left upper extremity and bilateral lower extremities. Pharmacy has been consulted to dose Vancomycin and Cefepime for possible pneumonia.    PMH: recent admission on vancomycin      Assessment/Plan    1. Day # 1 Vancomycin: Goal -600 mcg*h/mL and Goal trough 15-20 mcg/mL. 1750mg (~23mg/kg DBW) IV x1 dose followed by 1500mg (~20mg/kg DBW) IV q24h.  Pk level ordered for 1/3 at 0400.  Tr level ordered for 12/3 at 2300 prior to fourth total dose.    2. Day #1 Cefepime: 2gm IV q12h for estCrCl 30-59 mL/min.    Will continue to monitor drug levels, renal function, culture and sensitivities, and patient clinical status.       Objective:  Relevant clinical data and objective history reviewed:  165.1 cm (65\")   101 kg (222 lb 14.2 oz)   Ideal body weight: 57 kg (125 lb 10.6 oz)  Adjusted ideal body weight: 74.6 kg (164 lb 8.8 oz)  Body mass index is 37.09 kg/m².        Results from last 7 days   Lab Units 01/01/21  0212 12/31/20 2142 12/29/20  0437   CREATININE mg/dL 1.42* 1.40* 0.79     Estimated Creatinine Clearance: 59.5 mL/min (A) (by C-G formula based on SCr of 1.42 mg/dL (H)).  No intake/output data recorded.    Results from last 7 days   Lab Units 01/01/21  0212 12/31/20 2142 12/29/20  0437   WBC 10*3/mm3 7.10 8.00 9.80     Temperature    12/31/20 1712 12/31/20 1938   Temp: 97.8 °F (36.6 °C) 98.1 °F (36.7 °C)     Baseline culture/source/susceptibility:  Microbiology Results (last 10 days)       Procedure Component Value - Date/Time    Respiratory Panel PCR w/COVID-19(SARS-CoV-2) [facility] In-House, NP Swab in UTM/VTM, 3-4 HR TAT - Swab, Nasopharynx [[id number]]  (Normal) Collected: 01/01/21 0143    Lab Status: Final result Specimen: Swab from Nasopharynx Updated: 01/01/21 0244     ADENOVIRUS, PCR Not Detected     Coronavirus 229E Not " Detected     Coronavirus HKU1 Not Detected     Coronavirus NL63 Not Detected     Coronavirus OC43 Not Detected     COVID19 Not Detected     Human Metapneumovirus Not Detected     Human Rhinovirus/Enterovirus Not Detected     Influenza A PCR Not Detected     Influenza B PCR Not Detected     Parainfluenza Virus 1 Not Detected     Parainfluenza Virus 2 Not Detected     Parainfluenza Virus 3 Not Detected     Parainfluenza Virus 4 Not Detected     RSV, PCR Not Detected     Bordetella pertussis pcr Not Detected     Bordetella parapertussis PCR Not Detected     Chlamydophila pneumoniae PCR Not Detected     Mycoplasma pneumo by PCR Not Detected    Narrative:      Fact sheet for providers: https://docs.[employer]/wp-content/uploads/MLM5972-1616-DJ1.1-EUA-Provider-Fact-Sheet-3.pdf    Fact sheet for patients: https://docs.[employer]/wp-content/uploads/LBX5641-6082-ZN4.1-EUA-Patient-Fact-Sheet-1.pdf    Test performed by PCR.    Tissue / Bone Culture - Tissue, Shoulder, Left [[id number]]  (Abnormal)  (Susceptibility) Collected: 12/24/20 2156    Lab Status: Final result Specimen: Tissue from Shoulder, Left Updated: 12/27/20 08     Tissue Culture Light growth (2+) Staphylococcus aureus     Gram Stain Many (4+) Red blood cells      Few (2+) WBCs per low power field      Few (2+) Gram positive cocci, intracellular and extracellular    Susceptibility        Staphylococcus aureus     OLIVIA     Clindamycin Susceptible     Erythromycin Resistant     Inducible Clindamycin Resistance Negative     Oxacillin Susceptible     Penicillin G Resistant     Rifampin Susceptible     Tetracycline Susceptible     Trimethoprim + Sulfamethoxazole Susceptible     Vancomycin Susceptible                  Susceptibility Comments       Staphylococcus aureus    This isolate does not demonstrate inducible clindamycin resistance in vitro.                 Body Fluid Culture - Synovial Fluid, Shoulder, Left [[id number]] Collected: 12/24/20 2133    Lab  Status: Final result Specimen: Synovial Fluid from Shoulder, Left Updated: 12/30/20 0624     Body Fluid Culture No growth at 5 days     Gram Stain No organisms seen    Anaerobic Culture - Synovial Fluid, Shoulder, Left [[id number]] Collected: 12/24/20 2123    Lab Status: Final result Specimen: Synovial Fluid from Shoulder, Left Updated: 12/30/20 0609     Anaerobic Culture No anaerobes isolated at 5 days    Body Fluid Culture - Aspirate, Shoulder, Left [[id number]] Collected: 12/24/20 0911    Lab Status: Final result Specimen: Aspirate from Shoulder, Left Updated: 12/29/20 0612     Body Fluid Culture No growth at 5 days     Gram Stain Many (4+) WBCs per low power field      No organisms seen    Blood Culture - Blood, Arm, Left [[id number]] Collected: 12/23/20 1631    Lab Status: Final result Specimen: Blood from Arm, Left Updated: 12/28/20 1645     Blood Culture No growth at 5 days    Respiratory Panel, PCR (WITHOUT COVID) - Swab, Nasopharynx [[id number]]  (Normal) Collected: 12/23/20 0246    Lab Status: Final result Specimen: Swab from Nasopharynx Updated: 12/23/20 0400     ADENOVIRUS, PCR Not Detected     Coronavirus 229E Not Detected     Coronavirus HKU1 Not Detected     Coronavirus NL63 Not Detected     Coronavirus OC43 Not Detected     Human Metapneumovirus Not Detected     Human Rhinovirus/Enterovirus Not Detected     Influenza B PCR Not Detected     Parainfluenza Virus 1 Not Detected     Parainfluenza Virus 2 Not Detected     Parainfluenza Virus 3 Not Detected     Parainfluenza Virus 4 Not Detected     Bordetella pertussis pcr Not Detected     Chlamydophila pneumoniae PCR Not Detected     Mycoplasma pneumo by PCR Not Detected     Influenza A PCR Not Detected     RSV, PCR Not Detected     Bordetella parapertussis PCR Not Detected    Narrative:      The coronavirus on the RVP is NOT COVID-19 and is NOT indicative of infection with COVID-19.     Blood Culture - Blood, Arm, Right [[id number]]  (Abnormal)   (Susceptibility) Collected: 12/22/20 1926    Lab Status: Final result Specimen: Blood from Arm, Right Updated: 12/25/20 0635     Blood Culture Staphylococcus aureus     Comment: Infectious disease consultation is highly recommended to rule out distant foci of infection.        Isolated from Aerobic and Anaerobic Bottles     Gram Stain Aerobic Bottle Gram positive cocci in clusters      Anaerobic Bottle Gram positive cocci in clusters    Susceptibility        Staphylococcus aureus     OLIVIA     Gentamicin Susceptible     Oxacillin Susceptible     Rifampin Susceptible     Vancomycin Susceptible                  Susceptibility Comments       Staphylococcus aureus    This isolate does not demonstrate inducible clindamycin resistance in vitro.                 Blood Culture ID, PCR - Blood, Arm, Right [[id number]]  (Abnormal) Collected: 12/22/20 1926    Lab Status: Final result Specimen: Blood from Arm, Right Updated: 12/23/20 1206     BCID, PCR Staphylococcus aureus. Identification by BCID PCR.     BOTTLE TYPE Aerobic Bottle    Blood Culture - Blood, Arm, Right [[id number]]  (Abnormal) Collected: 12/22/20 1925    Lab Status: Final result Specimen: Blood from Arm, Right Updated: 12/25/20 0635     Blood Culture Staphylococcus aureus     Comment: Infectious disease consultation is highly recommended to rule out distant foci of infection.        Isolated from Aerobic and Anaerobic Bottles     Gram Stain Anaerobic Bottle Gram positive cocci in clusters      Aerobic Bottle Gram positive cocci in clusters    Narrative:      Refer to blood culture collected on 12/22/2020 1926 for MICs.    COVID-19, [facility] IN-HOUSE,NASAL Swab (NO TRANSPORT MEDIA) 2 HR TAT - Swab, Nasopharynx [[id number]]  (Normal) Collected: 12/22/20 1924    Lab Status: Final result Specimen: Swab from Nasopharynx Updated: 12/22/20 1956     COVID19 Presumptive Negative    Narrative:      Fact sheet for providers: https://www.fda.gov/media/896136/download     Fact  sheet for patients: https://www.fda.gov/media/685826/download    Test performed by PCR.  If inconsistent with clinical signs and symptoms patient should be tested with different authorized molecular test.    Urine Culture - Urine, Urine, Clean Catch [[id number]]  (Abnormal)  (Susceptibility) Collected: 12/22/20 1924    Lab Status: Final result Specimen: Urine, Clean Catch Updated: 12/24/20 1020     Urine Culture >100,000 CFU/mL Staphylococcus aureus     Comment: Staphylococcus aureus is not typically regarded as a uropathogen, except in cases with genitourinary instrumentation present.  It's presence suggests an alternate source (e.g. bloodstream, abscess, SSTI, etc.).  Please evaluate accordingly.       Susceptibility        Staphylococcus aureus     OLIVIA     Nitrofurantoin Susceptible     Oxacillin Susceptible     Tetracycline Susceptible     Trimethoprim + Sulfamethoxazole Susceptible     Vancomycin Susceptible                  Susceptibility Comments       Staphylococcus aureus    This isolate does not demonstrate inducible clindamycin resistance in vitro.                          Anti-Infectives (From admission, onward)      Ordered     Dose/Rate Route Frequency Start Stop    01/01/21 0229  !Vancomycin Level Draw Needed     Ordering Provider: Hillary Vale FNP     Does not apply Once 01/03/21 2300      01/01/21 0229  !Vancomycin Level Draw Needed     Ordering Provider: Hillary Vale FNP     Does not apply Once 01/03/21 0400      01/01/21 0229  vancomycin 1500 mg/500 mL 0.9% NS IVPB ([facility])     Ordering Provider: Hillary Vale FNP    1,500 mg Intravenous Every 24 Hours 01/02/21 0000 01/07/21 2359    01/01/21 0225  cefepime 2 gm IVPB in 100 ml NS ([facility])     Ordering Provider: Hillary Vale FNP    2 g  over 4 Hours Intravenous Every 12 Hours 01/01/21 1500 01/08/21 1459    01/01/21 0229  vancomycin IVPB 1750 mg in 0.9% Sodium Chloride (premix) 500 mL     Ordering Provider: Hillary Vale FNP    1,750 mg Intravenous Once  01/01/21 0400      01/01/21 0225  cefepime 2 gm IVPB in 100 ml NS ([facility])     Ordering Provider: Hillary Vale FNP    2 g  over 30 Minutes Intravenous Once 01/01/21 0330      01/01/21 0207  Pharmacy to Dose Cefepime     Ordering Provider: Hillary Vale FNP     Does not apply Continuous PRN 01/01/21 0207 01/08/21 0206    01/01/21 0207  Pharmacy to dose vancomycin     Ordering Provider: Hillary Vale FNP     Does not apply Continuous PRN 01/01/21 0207 01/08/21 0206            Westley Zhang  01/01/21 02:50 EST

## 2021-01-01 NOTE — PROGRESS NOTES
[facility] Medicine Services Daily Progress Note      Hospitalist Team  LOS 0 days      Patient Care Team:  Danie Dee MD as PCP - General    Patient Location: 4125/1      Subjective   Subjective     Chief Complaint / Subjective  Chief Complaint   Patient presents with   • Leg Swelling         Brief Synopsis of Hospital Course/HPI    45-year-old female presents to the ER with chief complaint of swelling to her left upper extremity and bilateral lower extremities as well as rash to her bilateral lower extremities which started today.  The patient has had cough without sputum production.  She denies subjective fever or chills.  The patient adamantly denies IV drug use although it is documented in review of records.  The patient states she does not know how that ended up in her records.  She reiterates that she has not ever, not even once, used IV drugs.       Recent hospital admission 12/22/2020 through 12/29/2020 for severe sepsis, UTI, hyponatremia.  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  Drain was left in and removed on 12/29/2020.  MSSA culture with recommendation for Rocephin x6 weeks.  Patient had MYLES 12/22/2020 showing LV function 65%, moderate TR, moderate pulmonary hypertension.  No vegetations seen.  Status post MRI of shoulder 12/24/2020 showing osteomyelitis.  Edema versus abscess.            Date::    1/1/21  Hurting and crying fromlt ue pain  Pending v doppler ule  Resumed diuretics  Add pain meds  reconsult id   Consult rnal foraki  Check ua  chek pulse ule    ROS  All other systems reviewed and negative    Objective   Objective      Vital Signs  Temp:  [97.8 °F (36.6 °C)-98.2 °F (36.8 °C)] 98.2 °F (36.8 °C)  Heart Rate:  [106-117] 112  Resp:  [16-20] 20  BP: (105-158)/() 158/94  Oxygen Therapy  SpO2: 92 %  Pulse Oximetry Type: Intermittent  Device (Oxygen Therapy): room air  Flowsheet Rows      First Filed Value   Admission Height   "165.1 cm (65\") Documented at 12/31/2020 1712   Admission Weight  101 kg (222 lb 14.2 oz) Documented at 12/31/2020 1712        Intake & Output (last 3 days)       12/29 0701 - 12/30 0700 12/30 0701 - 12/31 0700 12/31 0701 - 01/01 0700 01/01 0701 - 01/02 0700    IV Piggyback   1600     Total Intake(mL/kg)   1600 (15.8)     Net   +1600                 Lines, Drains & Airways    Active LDAs     Name:   Placement date:   Placement time:   Site:   Days:    Peripheral IV 12/31/20 2148 Anterior;Distal;Right;Upper Arm   12/31/20 2148    Arm   less than 1                  Physical Exam:    Physical Exam  Vitals signs and nursing note reviewed.   Constitutional:       General: She is not in acute distress.     Appearance: Normal appearance. She is well-developed. She is not ill-appearing, toxic-appearing or diaphoretic.   HENT:      Head: Normocephalic and atraumatic.      Right Ear: Ear canal and external ear normal.      Left Ear: Ear canal and external ear normal.      Nose: Nose normal. No congestion or rhinorrhea.      Mouth/Throat:      Mouth: Mucous membranes are moist.      Pharynx: No oropharyngeal exudate.   Eyes:      General: No scleral icterus.        Right eye: No discharge.         Left eye: No discharge.      Extraocular Movements: Extraocular movements intact.      Conjunctiva/sclera: Conjunctivae normal.      Pupils: Pupils are equal, round, and reactive to light.   Neck:      Musculoskeletal: Normal range of motion and neck supple. No neck rigidity or muscular tenderness.      Thyroid: No thyromegaly.      Vascular: No carotid bruit or JVD.      Trachea: No tracheal deviation.   Cardiovascular:      Rate and Rhythm: Normal rate and regular rhythm.      Pulses: Normal pulses.      Heart sounds: Normal heart sounds. No murmur. No friction rub. No gallop.    Pulmonary:      Effort: Pulmonary effort is normal. No respiratory distress.      Breath sounds: Normal breath sounds. No stridor. No wheezing, rhonchi " or rales.   Chest:      Chest wall: No tenderness.   Abdominal:      General: Bowel sounds are normal. There is no distension.      Palpations: Abdomen is soft. There is no mass.      Tenderness: There is no abdominal tenderness. There is no guarding or rebound.      Hernia: No hernia is present.   Musculoskeletal: Normal range of motion.         General: No swelling, tenderness, deformity or signs of injury.      Right lower leg: No edema.      Left lower leg: No edema.   Lymphadenopathy:      Cervical: No cervical adenopathy.   Skin:     General: Skin is warm and dry.      Coloration: Skin is not jaundiced or pale.      Findings: No bruising, erythema or rash.   Neurological:      General: No focal deficit present.      Mental Status: She is alert and oriented to person, place, and time. Mental status is at baseline.      Cranial Nerves: No cranial nerve deficit.      Sensory: No sensory deficit.      Motor: No weakness or abnormal muscle tone.      Coordination: Coordination normal.   Psychiatric:         Mood and Affect: Mood normal.         Behavior: Behavior normal.         Thought Content: Thought content normal.         Judgment: Judgment normal.     There is swelling to the left upper extremity and hand; there is no swelling noted in the lower extremities.  canot feel pulses   purpuric rash bilateral lower extremities       Wounds (last 24 hours)      LDA Wound     Row Name 01/01/21 0812 01/01/21 0807 01/01/21 0806       Wound 12/24/20 2219 Left anterior shoulder Incision    Wound - Properties Group Placement Date: 12/24/20  -PHILLIP Placement Time: 2219  -PHILLIP Present on Hospital Admission: Y  -PHILLIP Side: Left  -PHILLIP Orientation: anterior  -PHILLIP Location: shoulder  -PHILLIP Primary Wound Type: Incision  -PHILLIP    Dressing Appearance  intact;dry  -CS  --  --    Closure  Staples  -CS  --  --    Base  clean;dry  -CS  --  --    Periwound  intact  -CS  --  --    Periwound Temperature  warm  -CS  --  --    Drainage Amount  none  -CS   --  --    Staples Removed Intact  Yes  -CS  --  --    Retired Wound - Properties Group Date first assessed: 12/24/20  -PHILLIP Time first assessed: 2219  -PHILLIP Present on Hospital Admission: Y  -PHILLIP Side: Left  -PHILLIP Location: shoulder  -PHILLIP Primary Wound Type: Incision  -PHILLIP       Rash 12/31/20 0750 medial abdomen other (see comments)    Rash - Properties Group Placement Date: 12/31/20  -CS Placement Time: 0750  -CS Orientation: medial  -CS Location: abdomen  -CS Type: other (see comments)  -CS Additional Comments: red pinpoint  -CS    Wound Image  --  Images linked: 1  -CS  --    Retired Rash - Properties Group Date first assessed: 12/31/20  -CS Time first assessed: 0750  -CS Orientation: medial  -CS Location: abdomen  -CS Type: other (see comments)  -CS Additional Comments: red pinpoint  -CS       Rash 01/01/21 0751 Left calf patch    Rash - Properties Group Placement Date: 01/01/21  -CS Placement Time: 0751  -CS Side: Left  -CS Location: calf  -CS Type: patch  -CS    Wound Image  --  --  Images linked: 1  -CS    Retired Rash - Properties Group Date first assessed: 01/01/21  -CS Time first assessed: 0751  -CS Side: Left  -CS Location: calf  -CS Type: patch  -CS       Rash 01/01/21 0751 Right calf patch    Rash - Properties Group Placement Date: 01/01/21  -CS Placement Time: 0751  -CS Side: Right  -CS Location: calf  -CS Type: patch  -CS    Wound Image  --  Images linked: 1  -CS  --    Retired Rash - Properties Group Date first assessed: 01/01/21  -CS Time first assessed: 0751  -CS Side: Right  -CS Location: calf  -CS Type: patch  -CS      User Key  (r) = Recorded By, (t) = Taken By, (c) = Cosigned By    Initials Name Provider Type    Lucina Figueroa RN Registered Nurse    Leatha De Leon RN Registered Nurse          Procedures:              Results Review:     I reviewed the patient's new clinical results.      Lab Results (last 24 hours)     Procedure Component Value Units Date/Time    Respiratory Panel PCR  w/COVID-19(SARS-CoV-2) CARRIE/BARRETT/KANDY/PAD/COR/MAD/MAXIMILIANO In-House, NP Swab in UTM/VTM, 3-4 HR TAT - Swab, Nasopharynx [[id number]]  (Normal) Collected: 01/01/21 0143    Specimen: Swab from Nasopharynx Updated: 01/01/21 0244     ADENOVIRUS, PCR Not Detected     Coronavirus 229E Not Detected     Coronavirus HKU1 Not Detected     Coronavirus NL63 Not Detected     Coronavirus OC43 Not Detected     COVID19 Not Detected     Human Metapneumovirus Not Detected     Human Rhinovirus/Enterovirus Not Detected     Influenza A PCR Not Detected     Influenza B PCR Not Detected     Parainfluenza Virus 1 Not Detected     Parainfluenza Virus 2 Not Detected     Parainfluenza Virus 3 Not Detected     Parainfluenza Virus 4 Not Detected     RSV, PCR Not Detected     Bordetella pertussis pcr Not Detected     Bordetella parapertussis PCR Not Detected     Chlamydophila pneumoniae PCR Not Detected     Mycoplasma pneumo by PCR Not Detected    Narrative:      Fact sheet for providers: https://docs.[employer]/wp-content/uploads/ZIF7656-5427-WV7.1-EUA-Provider-Fact-Sheet-3.pdf    Fact sheet for patients: https://docs.[employer]/wp-content/uploads/NEO8075-4345-ZC4.1-EUA-Patient-Fact-Sheet-1.pdf    Test performed by PCR.    TSH [[id number]]  (Normal) Collected: 01/01/21 0212    Specimen: Blood Updated: 01/01/21 0242     TSH 1.900 uIU/mL     Troponin [[id number]]  (Normal) Collected: 01/01/21 0212    Specimen: Blood Updated: 01/01/21 0242     Troponin T <0.010 ng/mL     Narrative:      Troponin T Reference Range:  <= 0.03 ng/mL-   Negative for AMI  >0.03 ng/mL-     Abnormal for myocardial necrosis.  Clinicians would have to utilize clinical acumen, EKG, Troponin and serial changes to determine if it is an Acute Myocardial Infarction or myocardial injury due to an underlying chronic condition.       Results may be falsely decreased if patient taking Biotin.      BNP [[id number]]  (Abnormal) Collected: 01/01/21 0212    Specimen: Blood Updated: 01/01/21  0242     proBNP 7,980.0 pg/mL     Narrative:      Among patients with dyspnea, NT-proBNP is highly sensitive for the detection of acute congestive heart failure. In addition NT-proBNP of <300 pg/ml effectively rules out acute congestive heart failure with 99% negative predictive value.    Results may be falsely decreased if patient taking Biotin.      Basic Metabolic Panel [[id number]]  (Abnormal) Collected: 01/01/21 0212    Specimen: Blood Updated: 01/01/21 0236     Glucose 102 mg/dL      BUN 15 mg/dL      Creatinine 1.42 mg/dL      Sodium 135 mmol/L      Potassium 4.1 mmol/L      Chloride 101 mmol/L      CO2 25.0 mmol/L      Calcium 8.1 mg/dL      eGFR Non African Amer 40 mL/min/1.73      BUN/Creatinine Ratio 10.6     Anion Gap 9.0 mmol/L     Narrative:      GFR Normal >60  Chronic Kidney Disease <60  Kidney Failure <15      CBC Auto Differential [[id number]]  (Abnormal) Collected: 01/01/21 0212    Specimen: Blood Updated: 01/01/21 0216     WBC 7.10 10*3/mm3      RBC 3.32 10*6/mm3      Hemoglobin 10.2 g/dL      Hematocrit 31.0 %      MCV 93.4 fL      MCH 30.6 pg      MCHC 32.8 g/dL      RDW 17.3 %      RDW-SD 56.9 fl      MPV 7.4 fL      Platelets 299 10*3/mm3      Neutrophil % 71.3 %      Lymphocyte % 15.7 %      Monocyte % 10.9 %      Eosinophil % 1.6 %      Basophil % 0.5 %      Neutrophils, Absolute 5.00 10*3/mm3      Lymphocytes, Absolute 1.10 10*3/mm3      Monocytes, Absolute 0.80 10*3/mm3      Eosinophils, Absolute 0.10 10*3/mm3      Basophils, Absolute 0.00 10*3/mm3      nRBC 0.0 /100 WBC     POC Lactate [[id number]]  (Normal) Collected: 01/01/21 0146    Specimen: Blood Updated: 01/01/21 0147     Lactate 0.9 mmol/L      Comment: Serial Number: [id number]:  [id number]       Procalcitonin [[id number]]  (Normal) Collected: 12/31/20 2142    Specimen: Blood Updated: 01/01/21 0135     Procalcitonin 0.25 ng/mL     Narrative:      As a Marker for Sepsis (Non-Neonates):   1. <0.5 ng/mL represents a low risk of  "severe sepsis and/or septic shock.  1. >2 ng/mL represents a high risk of severe sepsis and/or septic shock.    As a Marker for Lower Respiratory Tract Infections that require antibiotic therapy:  PCT on Admission     Antibiotic Therapy             6-12 Hrs later  > 0.5                Strongly Recommended            >0.25 - <0.5         Recommended  0.1 - 0.25           Discouraged                   Remeasure/reassess PCT  <0.1                 Strongly Discouraged          Remeasure/reassess PCT      As 28 day mortality risk marker: \"Change in Procalcitonin Result\" (> 80 % or <=80 %) if Day 0 (or Day 1) and Day 4 values are available. Refer to http://[email or web address] Digitalpct-calculator.com/   Change in PCT <=80 %   A decrease of PCT levels below or equal to 80 % defines a positive change in PCT test result representing a higher risk for 28-day all-cause mortality of patients diagnosed with severe sepsis or septic shock.  Change in PCT > 80 %   A decrease of PCT levels of more than 80 % defines a negative change in PCT result representing a lower risk for 28-day all-cause mortality of patients diagnosed with severe sepsis or septic shock.                Results may be falsely decreased if patient taking Biotin.     Blood Culture - Blood, Arm, Right [[id number]] Collected: 01/01/21 0021    Specimen: Blood from Arm, Right Updated: 01/01/21 0025    Blood Culture - Blood, Arm, Right [[id number]] Collected: 12/31/20 2339    Specimen: Blood from Arm, Right Updated: 12/31/20 2342    Comprehensive Metabolic Panel [[id number]]  (Abnormal) Collected: 12/31/20 2142    Specimen: Blood Updated: 12/31/20 2204     Glucose 104 mg/dL      BUN 16 mg/dL      Creatinine 1.40 mg/dL      Sodium 135 mmol/L      Potassium 4.0 mmol/L      Chloride 101 mmol/L      CO2 25.0 mmol/L      Calcium 8.5 mg/dL      Total Protein 6.3 g/dL      Albumin 3.00 g/dL      ALT (SGPT) 26 U/L      AST (SGOT) 25 U/L      Alkaline Phosphatase 136 U/L      Total Bilirubin 0.5 " mg/dL      eGFR Non African Amer 41 mL/min/1.73      Globulin 3.3 gm/dL      A/G Ratio 0.9 g/dL      BUN/Creatinine Ratio 11.4     Anion Gap 9.0 mmol/L     Narrative:      GFR Normal >60  Chronic Kidney Disease <60  Kidney Failure <15      Troponin [[id number]]  (Normal) Collected: 12/31/20 2142    Specimen: Blood Updated: 12/31/20 2203     Troponin T <0.010 ng/mL     Narrative:      Troponin T Reference Range:  <= 0.03 ng/mL-   Negative for AMI  >0.03 ng/mL-     Abnormal for myocardial necrosis.  Clinicians would have to utilize clinical acumen, EKG, Troponin and serial changes to determine if it is an Acute Myocardial Infarction or myocardial injury due to an underlying chronic condition.       Results may be falsely decreased if patient taking Biotin.      BNP [[id number]]  (Abnormal) Collected: 12/31/20 2142    Specimen: Blood Updated: 12/31/20 2201     proBNP 8,937.0 pg/mL     Narrative:      Among patients with dyspnea, NT-proBNP is highly sensitive for the detection of acute congestive heart failure. In addition NT-proBNP of <300 pg/ml effectively rules out acute congestive heart failure with 99% negative predictive value.    Results may be falsely decreased if patient taking Biotin.      CBC & Differential [[id number]]  (Abnormal) Collected: 12/31/20 2142    Specimen: Blood Updated: 12/31/20 2149    Narrative:      The following orders were created for panel order CBC & Differential.  Procedure                               Abnormality         Status                     ---------                               -----------         ------                     CBC Auto Differential[[id number]]        Abnormal            Final result                 Please view results for these tests on the individual orders.    CBC Auto Differential [[id number]]  (Abnormal) Collected: 12/31/20 2142    Specimen: Blood Updated: 12/31/20 2149     WBC 8.00 10*3/mm3      RBC 3.65 10*6/mm3      Hemoglobin 11.2 g/dL      Hematocrit 34.2 %       MCV 93.7 fL      MCH 30.7 pg      MCHC 32.7 g/dL      RDW 17.2 %      RDW-SD 57.3 fl      MPV 7.8 fL      Platelets 337 10*3/mm3      Neutrophil % 69.8 %      Lymphocyte % 18.9 %      Monocyte % 8.9 %      Eosinophil % 1.7 %      Basophil % 0.7 %      Neutrophils, Absolute 5.60 10*3/mm3      Lymphocytes, Absolute 1.50 10*3/mm3      Monocytes, Absolute 0.70 10*3/mm3      Eosinophils, Absolute 0.10 10*3/mm3      Basophils, Absolute 0.10 10*3/mm3      nRBC 0.0 /100 WBC         No results found for: HGBA1C            No results found for: LIPASE  No results found for: CHOL, CHLPL, TRIG, HDL, LDL, LDLDIRECT    No results found for: INTRAOP, PREDX, FINALDX, COMDX    Microbiology Results (last 10 days)     Procedure Component Value - Date/Time    Respiratory Panel PCR w/COVID-19(SARS-CoV-2) CARRIE/BARRETT/KANDY/PAD/COR/MAD/MAXIMILIANO In-House, NP Swab in UTM/VTM, 3-4 HR TAT - Swab, Nasopharynx [[id number]]  (Normal) Collected: 01/01/21 0143    Lab Status: Final result Specimen: Swab from Nasopharynx Updated: 01/01/21 0244     ADENOVIRUS, PCR Not Detected     Coronavirus 229E Not Detected     Coronavirus HKU1 Not Detected     Coronavirus NL63 Not Detected     Coronavirus OC43 Not Detected     COVID19 Not Detected     Human Metapneumovirus Not Detected     Human Rhinovirus/Enterovirus Not Detected     Influenza A PCR Not Detected     Influenza B PCR Not Detected     Parainfluenza Virus 1 Not Detected     Parainfluenza Virus 2 Not Detected     Parainfluenza Virus 3 Not Detected     Parainfluenza Virus 4 Not Detected     RSV, PCR Not Detected     Bordetella pertussis pcr Not Detected     Bordetella parapertussis PCR Not Detected     Chlamydophila pneumoniae PCR Not Detected     Mycoplasma pneumo by PCR Not Detected    Narrative:      Fact sheet for providers: https://docs.[employer]/wp-content/uploads/CTW7835-7194-EZ2.1-EUA-Provider-Fact-Sheet-3.pdf    Fact sheet for patients:  https://docs.[employer]/wp-content/uploads/UGM9077-8980-BH9.1-EUA-Patient-Fact-Sheet-1.pdf    Test performed by PCR.    Tissue / Bone Culture - Tissue, Shoulder, Left [[id number]]  (Abnormal)  (Susceptibility) Collected: 12/24/20 2156    Lab Status: Final result Specimen: Tissue from Shoulder, Left Updated: 12/27/20 0852     Tissue Culture Light growth (2+) Staphylococcus aureus     Gram Stain Many (4+) Red blood cells      Few (2+) WBCs per low power field      Few (2+) Gram positive cocci, intracellular and extracellular    Susceptibility      Staphylococcus aureus     OLIVIA     Clindamycin Susceptible     Erythromycin Resistant     Inducible Clindamycin Resistance Negative     Oxacillin Susceptible     Penicillin G Resistant     Rifampin Susceptible     Tetracycline Susceptible     Trimethoprim + Sulfamethoxazole Susceptible     Vancomycin Susceptible                Susceptibility Comments     Staphylococcus aureus    This isolate does not demonstrate inducible clindamycin resistance in vitro.               Body Fluid Culture - Synovial Fluid, Shoulder, Left [[id number]] Collected: 12/24/20 2133    Lab Status: Final result Specimen: Synovial Fluid from Shoulder, Left Updated: 12/30/20 0624     Body Fluid Culture No growth at 5 days     Gram Stain No organisms seen    Anaerobic Culture - Synovial Fluid, Shoulder, Left [[id number]] Collected: 12/24/20 2123    Lab Status: Final result Specimen: Synovial Fluid from Shoulder, Left Updated: 12/30/20 0609     Anaerobic Culture No anaerobes isolated at 5 days    Body Fluid Culture - Aspirate, Shoulder, Left [[id number]] Collected: 12/24/20 0911    Lab Status: Final result Specimen: Aspirate from Shoulder, Left Updated: 12/29/20 0612     Body Fluid Culture No growth at 5 days     Gram Stain Many (4+) WBCs per low power field      No organisms seen    Blood Culture - Blood, Arm, Left [[id number]] Collected: 12/23/20 1631    Lab Status: Final result Specimen: Blood from Arm,  Left Updated: 12/28/20 1645     Blood Culture No growth at 5 days    Respiratory Panel, PCR (WITHOUT COVID) - Swab, Nasopharynx [[id number]]  (Normal) Collected: 12/23/20 0246    Lab Status: Final result Specimen: Swab from Nasopharynx Updated: 12/23/20 0400     ADENOVIRUS, PCR Not Detected     Coronavirus 229E Not Detected     Coronavirus HKU1 Not Detected     Coronavirus NL63 Not Detected     Coronavirus OC43 Not Detected     Human Metapneumovirus Not Detected     Human Rhinovirus/Enterovirus Not Detected     Influenza B PCR Not Detected     Parainfluenza Virus 1 Not Detected     Parainfluenza Virus 2 Not Detected     Parainfluenza Virus 3 Not Detected     Parainfluenza Virus 4 Not Detected     Bordetella pertussis pcr Not Detected     Chlamydophila pneumoniae PCR Not Detected     Mycoplasma pneumo by PCR Not Detected     Influenza A PCR Not Detected     RSV, PCR Not Detected     Bordetella parapertussis PCR Not Detected    Narrative:      The coronavirus on the RVP is NOT COVID-19 and is NOT indicative of infection with COVID-19.     Blood Culture - Blood, Arm, Right [[id number]]  (Abnormal)  (Susceptibility) Collected: 12/22/20 1926    Lab Status: Final result Specimen: Blood from Arm, Right Updated: 12/25/20 0635     Blood Culture Staphylococcus aureus     Comment: Infectious disease consultation is highly recommended to rule out distant foci of infection.        Isolated from Aerobic and Anaerobic Bottles     Gram Stain Aerobic Bottle Gram positive cocci in clusters      Anaerobic Bottle Gram positive cocci in clusters    Susceptibility      Staphylococcus aureus     OLIVIA     Gentamicin Susceptible     Oxacillin Susceptible     Rifampin Susceptible     Vancomycin Susceptible                Susceptibility Comments     Staphylococcus aureus    This isolate does not demonstrate inducible clindamycin resistance in vitro.               Blood Culture ID, PCR - Blood, Arm, Right [[id number]]  (Abnormal) Collected:  12/22/20 1926    Lab Status: Final result Specimen: Blood from Arm, Right Updated: 12/23/20 1206     BCID, PCR Staphylococcus aureus. Identification by BCID PCR.     BOTTLE TYPE Aerobic Bottle    Blood Culture - Blood, Arm, Right [[id number]]  (Abnormal) Collected: 12/22/20 1925    Lab Status: Final result Specimen: Blood from Arm, Right Updated: 12/25/20 0635     Blood Culture Staphylococcus aureus     Comment: Infectious disease consultation is highly recommended to rule out distant foci of infection.        Isolated from Aerobic and Anaerobic Bottles     Gram Stain Anaerobic Bottle Gram positive cocci in clusters      Aerobic Bottle Gram positive cocci in clusters    Narrative:      Refer to blood culture collected on 12/22/2020 1926 for MICs.    COVID-19, [facility] IN-HOUSE,NASAL Swab (NO TRANSPORT MEDIA) 2 HR TAT - Swab, Nasopharynx [[id number]]  (Normal) Collected: 12/22/20 1924    Lab Status: Final result Specimen: Swab from Nasopharynx Updated: 12/22/20 1956     COVID19 Presumptive Negative    Narrative:      Fact sheet for providers: https://www.fda.gov/media/364875/download     Fact sheet for patients: https://www.fda.gov/media/836114/download    Test performed by PCR.  If inconsistent with clinical signs and symptoms patient should be tested with different authorized molecular test.    Urine Culture - Urine, Urine, Clean Catch [[id number]]  (Abnormal)  (Susceptibility) Collected: 12/22/20 1924    Lab Status: Final result Specimen: Urine, Clean Catch Updated: 12/24/20 1020     Urine Culture >100,000 CFU/mL Staphylococcus aureus     Comment: Staphylococcus aureus is not typically regarded as a uropathogen, except in cases with genitourinary instrumentation present.  It's presence suggests an alternate source (e.g. bloodstream, abscess, SSTI, etc.).  Please evaluate accordingly.       Susceptibility      Staphylococcus aureus     OLIVIA     Nitrofurantoin Susceptible     Oxacillin Susceptible     Tetracycline  Susceptible     Trimethoprim + Sulfamethoxazole Susceptible     Vancomycin Susceptible                Susceptibility Comments     Staphylococcus aureus    This isolate does not demonstrate inducible clindamycin resistance in vitro.                     ECG/EMG Results (most recent)     Procedure Component Value Units Date/Time    ECG 12 Lead [[id number]] Collected: 12/31/20 2014     Updated: 12/31/20 2017     QT Interval 369 ms     Narrative:      HEART RATE= 106  bpm  RR Interval= 568  ms  IN Interval= 111  ms  P Horizontal Axis= -26  deg  P Front Axis= 53  deg  QRSD Interval= 73  ms  QT Interval= 369  ms  QRS Axis= 73  deg  T Wave Axis= 8  deg  - ABNORMAL ECG -  Sinus tachycardia  Nonspecific T abnormalities, diffuse leads  When compared with ECG of 22-Dec-2020 19:47:41,  No significant change  Electronically Signed By:   Date and Time of Study: 2020-12-31 20:14:42               Results for orders placed during the hospital encounter of 12/22/20   Adult Transthoracic Echo Complete W/ Cont if Necessary Per Protocol    Narrative · The left ventricular cavity is small in size.  · Left ventricular wall thickness is consistent with concentric   hypertrophy.  · Estimated left ventricular EF = 65% Left ventricular systolic function   is normal.  · The right ventricular cavity is mild to moderately dilated.  · The right atrial cavity is borderline dilated.  · Estimated right ventricular systolic pressure from tricuspid   regurgitation is moderately elevated (45-55 mmHg).  · Moderate pulmonary hypertension is present.          Xr Chest 1 View    Result Date: 12/31/2020   1. Cardiomegaly. 2. Interval development of right basilar consolidation felt to represent either pneumonia or atelectasis with a small pleural effusion.  Electronically Signed By-Raul Valdes MD On:12/31/2020 6:53 PM This report was finalized on [id number] by  Raul Valdes MD.    Ct Chest Pulmonary Embolism    Result Date: 12/31/2020  1. Large  consolidation in the right lower lobe. This likely represents a combination of atelectasis and consolidative right lower lobe pneumonia. There is an additional consolidation in the right middle lobe also likely representing atelectasis and consolidative pneumonia. Continuous radiographic follow-up to complete resolution is recommended. 2. There is a moderately sized right-sided pleural effusion. There is a trace left-sided pleural effusion. There is an element of early pulmonary edema. 3. Subtle reticulonodular type infiltrate in the anterior aspect of the right upper lobe likely representing mild infectious bronchopneumonia. 4. Hepatomegaly with hepatic steatosis. 5. Emphysema. 6. Body wall edema. 7. No definitive pulmonary embolus. Slot 63 Electronically signed by:  Marvin Martinez M.D.  12/31/2020 8:59 PM    Mri Shoulder Left With & Without Contrast    Result Date: 12/24/2020  1.Evidence for changes of septic arthropathy involving the left acromioclavicular joint. There is evidence for associated osteomyelitis involving the adjacent clavicle and acromion. 2.There is extensive edema and enhancement throughout the surrounding soft tissues overlying the region of the left clavicle and left shoulder. These findings indicate extensive soft tissue infection throughout this region. There is evidence for spread infection to involve the soft tissues at the base of the left neck as well as at the anterior left lateral aspect of the thoracic inlet. 3.Evidence for developing myonecrosis and possible deep muscular soft tissue abscess along the superior margin of the trapezius musculature. This area of myonecrosis and possible developing abscess measures approximately 10 cm x 1.5 cm in axial dimensions. Electronically Signed: Sohan Eng MD 12/24/2020 13:41 EST          Xrays, labs reviewed personally by physician.    Medication Review:   I have reviewed the patient's current medication list      Scheduled Meds  [START ON  1/3/2021] !Vancomycin Level Draw Needed, , Does not apply, Once  [START ON 1/3/2021] !Vancomycin Level Draw Needed, , Does not apply, Once  aspirin, 325 mg, Oral, Daily  cefepime, 2 g, Intravenous, Q12H  sodium chloride, 10 mL, Intravenous, Q12H  [START ON 1/2/2021] vancomycin, 1,500 mg, Intravenous, Q24H        Meds Infusions  Pharmacy to Dose Cefepime,   Pharmacy to dose vancomycin,         Meds PRN  acetaminophen **OR** acetaminophen **OR** acetaminophen  •  ondansetron **OR** ondansetron  •  Pharmacy to Dose Cefepime  •  Pharmacy to dose vancomycin  •  sodium chloride        Assessment/Plan   Assessment/Plan     Active Hospital Problems    Diagnosis  POA   • Congestive heart failure (CMS/[facility]) [I50.9]  Yes      Resolved Hospital Problems   No resolved problems to display.       MEDICAL DECISION MAKING COMPLEXITY BY PROBLEM:         Pneumonia, right lower and middle lobe--recent hospitalization x1 week  Respiratory panel including Covid 1/1/2021 -  Need to rule out infective endocarditis/septic emboli  Patient had 2D echo last admission  Consider MYLES  We will ask ID for evaluation  Continue current antibiotics with cefepime and Vanco until then     Methicillin susceptible Staphylococcus aureus bacteremia and IV drug user.    Most likely secondary to left shoulder infection and left Acromioclavicular joint infection  2D echo showed no signs of vegetation.  Cardio service are comfortable reading the 2D echo alone since it was good imaging study.  -Continue Rocephin 2 g IV daily for 6 weeks  Patient will need to be a daily stick at ambulatory care      Left upper extremity edema-  Doppler pending  Could be dependent edema related to recent shoulder surgery    Acute kidney injury  Could be cardiorenal  We will ask nephrology for evaluation given complexity of this patient's presentation  Could be related to vasculitis as well       MSSA of the left shoulder with osteomyelitis, stable on outpatient antibiotics: Hold  Rocephin while on cefepime     Suspect right-sided heart failure  Elevated BNP with recent echocardiogram December 2020, showing EF 65% with pulmonary hypertension and moderate tricuspid regurgitation:   Agree with diuresis as tolerated    Rash-  Etiology not clear  Appreciate ID input    Normocytic anemia likely of chronic disease  We will monitor  Anemia panel ordered      High risk for limb loss  dw patient  VTE Prophylaxis -   Mechanical Order History:     None      Pharmalogical Order History:      Ordered     Dose Route Frequency Stop    12/31/20 2348  enoxaparin (LOVENOX) syringe 40 mg  Status:  Discontinued      40 mg SC Daily 01/01/21 0215    01/01/21 0215  enoxaparin (LOVENOX) syringe 100 mg      1 mg/kg SC Once 01/01/21 0316                  Code Status -   Code Status and Medical Interventions:   Ordered at: 12/31/20 2348     Code Status:    CPR     Medical Interventions (Level of Support Prior to Arrest):    Full       This patient has been examined wearing appropriate Personal Protective Equipment and discussed with hospital infection control department. 01/01/21        Discharge Planning  home        Electronically signed by Julian Godoy MD, 01/01/21, 09:24 EST.  [facility]ist Team

## 2021-01-01 NOTE — ED NOTES
LÓPEZ Sabillon notified pt c/o pain and requesting medication. No new orders at this time.     Kristine Bond RN  01/01/21 0339

## 2021-01-01 NOTE — CONSULTS
Infectious Diseases Consult Note    Referring Provider: Julian Godoy *    Reason for Consultation: Pneumonia, recent bacteremia and left shoulder infection    Patient Care Team:  Danie Dee MD as PCP - General    Chief complaint extremity swelling, rash after receiving antibiotics yesterday    Subjective     The patient has been afebrile for the last 24 hours.  The patient is on room air, hemodynamically stable, and is tolerating antimicrobial therapy.    History of present illness:      This is a 45-year-old female the presents the hospital on 12/31/2020 with swelling in all extremities but especially the left upper arm and a rash that developed after she had her antibiotics at the ambulatory care center yesterday.  Patient was just discharged on 12/29/2020 with this is a bacteremia and a left septic shoulder.  She was finishing up her IV Rocephin at the ambulatory care center.  Patient denies fever, chills, diaphoresis, shortness of breath, cough, GI symptoms or urinary symptoms.  She is however having swelling in all extremities especially the left arm and has a rash to the bilateral lower extremities.  She says the rash both itches and burns.    Patient is currently on room air and does not appear to be in acute distress.  Patient been afebrile since admission.  She is a creatinine 1.42, white blood cell count of 7.1, hemoglobin 10.2, platelets of 299.  BNP was elevated.  Blood cultures are pending and a COVID-19 and respiratory viral DNA panel was negative.  Doppler of the left arm did not show a DVT but she did has SVT in her basilic cephalic and median cubital veins.  CT PE protocol was negative for PE but showed a large consolidation of the right middle and lower lobe along with a right pleural effusion.  Patient is currently on IV cefepime and IV vancomycin.  She is allergies to penicillins    Our service just saw the patient for MSSA bacteremia and a left septic shoulder also with MSSA.   Patient was discharged on 12/29/2020 to finish up 6 weeks of IV Rocephin in the ambulatory care center.  Patient has a history of history of type 2 diabetes, is restless leg syndrome, hypertension, hyperlipidemia's, hydradenitis suppurative, depression chronic right leg pain, anxiety.  She is a current every day smoker but denies alcohol abuse.  Patient is denying IV drug abuse this admission but admitted to IV drug abuse last admission with methamphetamines      Review of Systems   Review of Systems   Constitutional: Negative.    HENT: Negative.    Eyes: Negative.    Respiratory: Negative.    Cardiovascular: Positive for leg swelling.   Gastrointestinal: Negative.    Endocrine: Negative.    Genitourinary: Negative.    Musculoskeletal: Negative.    Skin: Positive for rash.   Neurological: Negative.    Psychiatric/Behavioral: Negative.    All other systems reviewed and are negative.      Medications  Medications Prior to Admission   Medication Sig Dispense Refill Last Dose   • cefTRIAXone 2 g in sodium chloride 0.9 % 100 mL IVPB Infuse 2 g into a venous catheter Daily for 36 doses. Indications: Infection with Dysregulated Inflammatory Response      • miconazole (MICOTIN) 2 % vaginal cream Insert 1 applicator into the vagina Every Night for 6 doses. 6 g 0        History  Past Medical History:   Diagnosis Date   • Allergic rhinitis 10/1/2015   • Anxiety 12/22/2020   • Cervical radiculopathy 12/22/2020   • Chronic low back pain 5/11/2015   • Depression 12/22/2020   • Drug abuse, IV (CMS/[facility]) 12/22/2020   • Hidradenitis suppurativa 9/11/2013   • Hyperlipidemia 5/11/2015   • Hypertension 12/22/2020   • Insomnia due to mental disorder 10/17/2014   • Restless leg syndrome 5/11/2015   • Tobacco use 5/11/2015   • Type 2 diabetes mellitus without complications (CMS/[facility]) 12/22/2020     Past Surgical History:   Procedure Laterality Date   • ARM DEBRIDEMENT Left 12/24/2020    Procedure: Arthrotomy of left shoulder with irrigation  and decompression of trapezial abscess with irrigation and debridement;  Surgeon: Clayton Bergeron MD;  Location: [facility] MAIN OR;  Service: Orthopedics;  Laterality: Left;   • SHOULDER ACROMIOCLAVICULAR JOINT REPAIR Left 12/24/2020    Procedure: SHOULDER ACROMIOCLAVICULAR JOINT RESECTION;  Surgeon: Clayton Bergeron MD;  Location: [facility] MAIN OR;  Service: Orthopedics;  Laterality: Left;       Family History  Family History   Problem Relation Age of Onset   • Diabetes Mother    • Heart disease Mother    • COPD Father        Social History   reports that she has been smoking cigarettes. She has been smoking about 0.50 packs per day. She does not have any smokeless tobacco history on file. She reports previous alcohol use. She reports current drug use. Drugs: IV and Methamphetamines.    Allergies  Penicillins    Objective     Vital Signs   Vital Signs (last 24 hours)       12/31 0700  -  01/01 0659 01/01 0700  -  01/01 1330   Most Recent    Temp (°F) 97.8 -  98.1    98.2 -  98.5     98.5 (36.9)    Heart Rate 106 -  117    108 -  112     108    Resp 18 -  19    18 -  20     18    /92 -  157/98      158/94     --  Comment: pt refused blood pressure    SpO2 (%) 91 -  95    91 -  92     91          Physical Exam:  Physical Exam  Vitals signs and nursing note reviewed.   Constitutional:       General: She is not in acute distress.     Appearance: Normal appearance. She is well-developed and normal weight. She is not diaphoretic.   HENT:      Head: Normocephalic and atraumatic.   Eyes:      General: No scleral icterus.     Extraocular Movements: Extraocular movements intact.      Conjunctiva/sclera: Conjunctivae normal.      Pupils: Pupils are equal, round, and reactive to light.   Neck:      Musculoskeletal: Neck supple.   Cardiovascular:      Rate and Rhythm: Normal rate and regular rhythm.      Heart sounds: Normal heart sounds, S1 normal and S2 normal. No murmur.   Pulmonary:      Effort: Pulmonary effort is  normal. No respiratory distress.      Breath sounds: Normal breath sounds. No stridor. No wheezing or rales.   Chest:      Chest wall: No tenderness.   Abdominal:      General: Bowel sounds are normal. There is no distension.      Palpations: Abdomen is soft. There is no mass.      Tenderness: There is no abdominal tenderness. There is no guarding.   Musculoskeletal: Normal range of motion.         General: No swelling, tenderness or deformity.      Comments: Significant swelling in left upper extremity including hand    Moderate swelling in right upper extremity    Very minor swelling in bilateral lower legs   Skin:     General: Skin is warm and dry.      Coloration: Skin is not pale.      Findings: Rash present. No bruising or erythema.      Comments: Purpuric rash on bilateral lower extremities-most severe on bilateral calves    Incision site on left shoulder appears to be healing well without any signs of infection   Neurological:      General: No focal deficit present.      Mental Status: She is alert and oriented to person, place, and time. Mental status is at baseline.      Cranial Nerves: No cranial nerve deficit.   Psychiatric:         Mood and Affect: Mood normal.         Microbiology  Microbiology Results (last 10 days)     Procedure Component Value - Date/Time    Respiratory Panel PCR w/COVID-19(SARS-CoV-2) CARRIE/BARRETT/KANDY/PAD/[facility]IMIL[facility] In-House, NP Swab in UTM/VTM, 3-4 HR TAT - Swab, Nasopharynx [[id number]]  (Normal) Collected: 01/01/21 0143    Lab Status: Final result Specimen: Swab from Nasopharynx Updated: 01/01/21 0244     ADENOVIRUS, PCR Not Detected     Coronavirus 229E Not Detected     Coronavirus HKU1 Not Detected     Coronavirus NL63 Not Detected     Coronavirus OC43 Not Detected     COVID19 Not Detected     Human Metapneumovirus Not Detected     Human Rhinovirus/Enterovirus Not Detected     Influenza A PCR Not Detected     Influenza B PCR Not Detected     Parainfluenza Virus 1 Not Detected      Parainfluenza Virus 2 Not Detected     Parainfluenza Virus 3 Not Detected     Parainfluenza Virus 4 Not Detected     RSV, PCR Not Detected     Bordetella pertussis pcr Not Detected     Bordetella parapertussis PCR Not Detected     Chlamydophila pneumoniae PCR Not Detected     Mycoplasma pneumo by PCR Not Detected    Narrative:      Fact sheet for providers: https://docs.[employer]/wp-content/uploads/GVC1958-2281-AK5.1-EUA-Provider-Fact-Sheet-3.pdf    Fact sheet for patients: https://docs.[employer]/wp-content/uploads/CCK8892-2357-ST1.1-EUA-Patient-Fact-Sheet-1.pdf    Test performed by PCR.    Tissue / Bone Culture - Tissue, Shoulder, Left [[id number]]  (Abnormal)  (Susceptibility) Collected: 12/24/20 2156    Lab Status: Final result Specimen: Tissue from Shoulder, Left Updated: 12/27/20 0852     Tissue Culture Light growth (2+) Staphylococcus aureus     Gram Stain Many (4+) Red blood cells      Few (2+) WBCs per low power field      Few (2+) Gram positive cocci, intracellular and extracellular    Susceptibility      Staphylococcus aureus     OLIVIA     Clindamycin Susceptible     Erythromycin Resistant     Inducible Clindamycin Resistance Negative     Oxacillin Susceptible     Penicillin G Resistant     Rifampin Susceptible     Tetracycline Susceptible     Trimethoprim + Sulfamethoxazole Susceptible     Vancomycin Susceptible                Susceptibility Comments     Staphylococcus aureus    This isolate does not demonstrate inducible clindamycin resistance in vitro.               Body Fluid Culture - Synovial Fluid, Shoulder, Left [[id number]] Collected: 12/24/20 2133    Lab Status: Final result Specimen: Synovial Fluid from Shoulder, Left Updated: 12/30/20 0624     Body Fluid Culture No growth at 5 days     Gram Stain No organisms seen    Anaerobic Culture - Synovial Fluid, Shoulder, Left [[id number]] Collected: 12/24/20 2123    Lab Status: Final result Specimen: Synovial Fluid from Shoulder, Left Updated:  12/30/20 0609     Anaerobic Culture No anaerobes isolated at 5 days    Body Fluid Culture - Aspirate, Shoulder, Left [[id number]] Collected: 12/24/20 0911    Lab Status: Final result Specimen: Aspirate from Shoulder, Left Updated: 12/29/20 0612     Body Fluid Culture No growth at 5 days     Gram Stain Many (4+) WBCs per low power field      No organisms seen    Blood Culture - Blood, Arm, Left [[id number]] Collected: 12/23/20 1631    Lab Status: Final result Specimen: Blood from Arm, Left Updated: 12/28/20 1645     Blood Culture No growth at 5 days    Respiratory Panel, PCR (WITHOUT COVID) - Swab, Nasopharynx [[id number]]  (Normal) Collected: 12/23/20 0246    Lab Status: Final result Specimen: Swab from Nasopharynx Updated: 12/23/20 0400     ADENOVIRUS, PCR Not Detected     Coronavirus 229E Not Detected     Coronavirus HKU1 Not Detected     Coronavirus NL63 Not Detected     Coronavirus OC43 Not Detected     Human Metapneumovirus Not Detected     Human Rhinovirus/Enterovirus Not Detected     Influenza B PCR Not Detected     Parainfluenza Virus 1 Not Detected     Parainfluenza Virus 2 Not Detected     Parainfluenza Virus 3 Not Detected     Parainfluenza Virus 4 Not Detected     Bordetella pertussis pcr Not Detected     Chlamydophila pneumoniae PCR Not Detected     Mycoplasma pneumo by PCR Not Detected     Influenza A PCR Not Detected     RSV, PCR Not Detected     Bordetella parapertussis PCR Not Detected    Narrative:      The coronavirus on the RVP is NOT COVID-19 and is NOT indicative of infection with COVID-19.     Blood Culture - Blood, Arm, Right [[id number]]  (Abnormal)  (Susceptibility) Collected: 12/22/20 1926    Lab Status: Edited Result - FINAL Specimen: Blood from Arm, Right Updated: 01/01/21 1315     Blood Culture Staphylococcus aureus     Comment: Infectious disease consultation is highly recommended to rule out distant foci of infection.        Isolated from Aerobic and Anaerobic Bottles     Gram Stain  Aerobic Bottle Gram positive cocci in clusters      Anaerobic Bottle Gram positive cocci in clusters    Susceptibility      Staphylococcus aureus     OLIVIA     Daptomycin Susceptible (C) [1]      Gentamicin Susceptible     Oxacillin Susceptible     Rifampin Susceptible     Vancomycin Susceptible            [1]   Appended report. These results have been appended to a previously final verified report.             Susceptibility Comments     Staphylococcus aureus    This isolate does not demonstrate inducible clindamycin resistance in vitro.  Daptomycin susceptibility requested by Rossana ([employer] pharm) 01/01/21 at 13:14.               Blood Culture ID, PCR - Blood, Arm, Right [[id number]]  (Abnormal) Collected: 12/22/20 1926    Lab Status: Final result Specimen: Blood from Arm, Right Updated: 12/23/20 1206     BCID, PCR Staphylococcus aureus. Identification by BCID PCR.     BOTTLE TYPE Aerobic Bottle    Blood Culture - Blood, Arm, Right [[id number]]  (Abnormal) Collected: 12/22/20 1925    Lab Status: Final result Specimen: Blood from Arm, Right Updated: 12/25/20 0635     Blood Culture Staphylococcus aureus     Comment: Infectious disease consultation is highly recommended to rule out distant foci of infection.        Isolated from Aerobic and Anaerobic Bottles     Gram Stain Anaerobic Bottle Gram positive cocci in clusters      Aerobic Bottle Gram positive cocci in clusters    Narrative:      Refer to blood culture collected on 12/22/2020 1926 for MICs.    COVID-19, [facility] IN-HOUSE,NASAL Swab (NO TRANSPORT MEDIA) 2 HR TAT - Swab, Nasopharynx [[id number]]  (Normal) Collected: 12/22/20 1924    Lab Status: Final result Specimen: Swab from Nasopharynx Updated: 12/22/20 1956     COVID19 Presumptive Negative    Narrative:      Fact sheet for providers: https://www.fda.gov/media/780988/download     Fact sheet for patients: https://www.fda.gov/media/022213/download    Test performed by PCR.  If inconsistent with clinical signs and  symptoms patient should be tested with different authorized molecular test.    Urine Culture - Urine, Urine, Clean Catch [[id number]]  (Abnormal)  (Susceptibility) Collected: 12/22/20 1924    Lab Status: Final result Specimen: Urine, Clean Catch Updated: 12/24/20 1020     Urine Culture >100,000 CFU/mL Staphylococcus aureus     Comment: Staphylococcus aureus is not typically regarded as a uropathogen, except in cases with genitourinary instrumentation present.  It's presence suggests an alternate source (e.g. bloodstream, abscess, SSTI, etc.).  Please evaluate accordingly.       Susceptibility      Staphylococcus aureus     OLIVIA     Nitrofurantoin Susceptible     Oxacillin Susceptible     Tetracycline Susceptible     Trimethoprim + Sulfamethoxazole Susceptible     Vancomycin Susceptible                Susceptibility Comments     Staphylococcus aureus    This isolate does not demonstrate inducible clindamycin resistance in vitro.                     Laboratory  Results from last 7 days   Lab Units 01/01/21  0212   WBC 10*3/mm3 7.10   HEMOGLOBIN g/dL 10.2*   HEMATOCRIT % 31.0*   PLATELETS 10*3/mm3 299     Results from last 7 days   Lab Units 01/01/21  0212   SODIUM mmol/L 135*   POTASSIUM mmol/L 4.1   CHLORIDE mmol/L 101   CO2 mmol/L 25.0   BUN mg/dL 15   CREATININE mg/dL 1.42*   GLUCOSE mg/dL 102*   CALCIUM mg/dL 8.1*     Results from last 7 days   Lab Units 01/01/21  0212   SODIUM mmol/L 135*   POTASSIUM mmol/L 4.1   CHLORIDE mmol/L 101   CO2 mmol/L 25.0   BUN mg/dL 15   CREATININE mg/dL 1.42*   GLUCOSE mg/dL 102*   CALCIUM mg/dL 8.1*         Results from last 7 days   Lab Units 12/28/20  0510   SED RATE mm/hr 80*           Radiology  Imaging Results (Last 72 Hours)     Procedure Component Value Units Date/Time    CT Chest Pulmonary Embolism [[id number]] Collected: 12/31/20 2054     Updated: 12/31/20 2300    Narrative:      Exam: CTA thorax, PE protocol    Date: December 31, 2020    History: Bilateral lower extremity  edema, shortness of breath, recent shoulder surgery    Comparison: None available    Technique: Contiguous axial CT images obtained of the thorax following the uneventful administration of 69 mL Isovue-370 contrast. Additionally, sagittal, coronal and 3-D reformatted images were obtained. CT dose lowering techniques were used, to   include: automated exposure control, adjustment for patient size, and or use of iterative reconstruction.    Findings:    Thoracic aorta: There is no aneurysm or dissection.    HEART: The heart is not enlarged.    Pulmonary arteries: The pulmonary arteries are reasonably well visualized. There is some limitation secondary to respiratory motion artifact. There is no filling defect to suggest an acute pulmonary embolus.    Mediastinum: There is no pathologic mediastinal adenopathy.    Lung parenchyma: There is a background of emphysema. There is a mild reticulonodular type infiltrate in the anterior aspect of the right upper lobe. There is a moderately sized right-sided pleural effusion. There is a large consolidation in the right   lower lobe. There is also a consolidation in the right middle lobe. The lung volumes are markedly diminished. There is an element of interlobular septal thickening in the periphery of both lungs. There is a trace left-sided pleural effusion.    Upper abdomen: The gallbladder is surgically absent. The liver is enlarged and diffusely hypodense.    MUSCULOSKELETAL: There is body wall edema. No acute fractures are identified.      Impression:      1. Large consolidation in the right lower lobe. This likely represents a combination of atelectasis and consolidative right lower lobe pneumonia. There is an additional consolidation in the right middle lobe also likely representing atelectasis and   consolidative pneumonia. Continuous radiographic follow-up to complete resolution is recommended.  2. There is a moderately sized right-sided pleural effusion. There is a  trace left-sided pleural effusion. There is an element of early pulmonary edema.  3. Subtle reticulonodular type infiltrate in the anterior aspect of the right upper lobe likely representing mild infectious bronchopneumonia.   4. Hepatomegaly with hepatic steatosis.  5. Emphysema.  6. Body wall edema.  7. No definitive pulmonary embolus.        Slot 63    Electronically signed by:  Marvin Martinez M.D.    12/31/2020 8:59 PM    XR Chest 1 View [[id number]] Collected: 12/31/20 1852     Updated: 12/31/20 1855    Narrative:      DATE OF EXAM:  12/31/2020 6:44 PM     PROCEDURE:  XR CHEST 1 VW-     INDICATIONS:  Shortness of breath, lower extremity swelling, hypertension.      COMPARISON:  12/22/2020.     TECHNIQUE:   Single radiographic view of the chest was obtained.     FINDINGS:  The heart is enlarged. There is abnormal right basilar consolidation  felt to represent either pneumonia or atelectasis with a small pleural  effusion. The left lung and pleural space are clear. The pulmonary  vascular markings are normal.  The bony thorax is normal for age.       Impression:         1. Cardiomegaly.  2. Interval development of right basilar consolidation felt to represent  either pneumonia or atelectasis with a small pleural effusion.     Electronically Signed By-Raul Valdes MD On:12/31/2020 6:53 PM  This report was finalized on [id number] by  Raul Valdes MD.          Cardiology      Results Review:  I have reviewed all clinical data, test, lab, and imaging results.       Schedule Meds  aspirin, 325 mg, Oral, Daily  DAPTOmycin, 6 mg/kg (Adjusted), Intravenous, Q24H  furosemide, 20 mg, Intravenous, BID  sodium chloride, 10 mL, Intravenous, Q12H        Infusion Meds       PRN Meds  •  acetaminophen **OR** acetaminophen **OR** acetaminophen  •  diphenhydrAMINE  •  diphenhydrAMINE-zinc acetate  •  ondansetron **OR** ondansetron  •  oxyCODONE  •  sodium chloride      Assessment/Plan       Methicillin susceptible Staphylococcus  aureus bacteremia and IV drug user found during last admission.  Most likely secondary to left shoulder infection and left Acromioclavicular joint infection  2D echo showed no signs of vegetation from last admission.  Cardio service are comfortable reading the 2D echo alone since it was good imaging study.  -Patient was discharged on 12/29/2020 and was receiving IV Rocephin at the ambulatory care clinic    Patient came in on 12/31/2020 with complaints of extremity swelling especially in the left extremity   -Doppler of the left extremity did not show DVT but had multiple SVTs  -Surgical incisions appear to be healing well with no obvious signs of infection    Purpuric rash on bilateral lower legs that patient complains are itching and burning  -Started 12/31/2020 after patient received IV Rocephin in ambulatory care clinic  -Likely reaction to antibiotics     Left septic shoulder/Acromioclavicular joint infection with abscess in the trapezius musculature found on last admission  The patient is s/p washout of the left shoulder and Acromioclavicular joint debridement with drainage of trapezius muscle abscess on December 24, 2020.  Intraoperative cultures are growing methicillin susceptible Staphylococcus aureus     IV drug user  -Drug screen from last admission was positive for methamphetamines and opiates  -Hepatitis B and C and HIV screens were negative     Positive urine culture for methicillin susceptible Staph aureus found during last admission.  This is descending infection as a result of bacteremia    New finding this admission of right lower and middle lobe consolidation with right pleural effusion  -Patient denies shortness of breath and is currently on room air    Current COVID-19 screen is negative     Plan     Discontinue IV cefepime and IV vancomycin  Start IV daptomycin 6 mg/kg every 24 hours for 33 days to finish up 6 weeks of treatment for MSSA bacteremia and joint infection  Stat CPK   Patient does  not appear to be on a statin-please do not start a statin while patient is on IV daptomycin  Start IV Zyvox 600 mg every 12 hours  Blood cultures are pending  Continue supportive care  A.m. labs  Tobacco abuse cessation  Illicit drug abuse cessation     Rossana Wilder, APRN  01/01/21  13:30 EST

## 2021-01-01 NOTE — H&P
[facility] Medicine Services      Patient Name: Radha Curtis  : 1975  MRN: [id number]  Primary Care Physician: Danie Dee MD  Date of admission: 2020    Patient Care Team:  Danie Dee MD as PCP - General          Subjective   History Present Illness     Chief Complaint:   Chief Complaint   Patient presents with   • Leg Swelling       Ms. Curtis is a 45 y.o.  presents to [facility] complaining of edema.         45-year-old female presents to the ER with chief complaint of swelling to her left upper extremity and bilateral lower extremities as well as rash to her bilateral lower extremities which started today.  The patient has had cough without sputum production.  She denies subjective fever or chills.  The patient adamantly denies IV drug use although it is documented in review of records.  The patient states she does not know how that ended up in her records.  She reiterates that she has not ever, not even once, used IV drugs.      Recent hospital admission 2020 through 2020 for severe sepsis, UTI, hyponatremia.  Patient had debridement of the trapezius muscle abscess on 2020.  Drain was left in and removed on 2020.  MSSA culture with recommendation for Rocephin x6 weeks.  Patient had TTE 2020 showing LV function 65%, moderate TR, moderate pulmonary hypertension.  No vegetations seen.  Status post MRI of shoulder 2020 showing osteomyelitis.  Edema versus abscess.      Review of Systems   Constitution: Negative for chills and fever.   HENT: Negative for congestion.    Gastrointestinal: Negative for nausea.   All other systems reviewed and are negative.        Personal History     Past Medical History:   Past Medical History:   Diagnosis Date   • Allergic rhinitis 10/1/2015   • Anxiety 2020   • Cervical radiculopathy 2020   • Chronic low back pain 2015   • Depression 2020   • Drug abuse, IV  (CMS/[facility]) 12/22/2020   • Hidradenitis suppurativa 9/11/2013   • Hyperlipidemia 5/11/2015   • Hypertension 12/22/2020   • Insomnia due to mental disorder 10/17/2014   • Restless leg syndrome 5/11/2015   • Tobacco use 5/11/2015   • Type 2 diabetes mellitus without complications (CMS/[facility]) 12/22/2020       Surgical History:      Past Surgical History:   Procedure Laterality Date   • ARM DEBRIDEMENT Left 12/24/2020    Procedure: Arthrotomy of left shoulder with irrigation and decompression of trapezial abscess with irrigation and debridement;  Surgeon: Clayton Bergeron MD;  Location: [facility];  Service: Orthopedics;  Laterality: Left;   • SHOULDER ACROMIOCLAVICULAR JOINT REPAIR Left 12/24/2020    Procedure: SHOULDER ACROMIOCLAVICULAR JOINT RESECTION;  Surgeon: Clayton Bergeron MD;  Location: [facility] OR;  Service: Orthopedics;  Laterality: Left;           Family History: family history includes COPD in her father; Diabetes in her mother; Heart disease in her mother. Otherwise pertinent FHx was reviewed and unremarkable.     Social History:  reports that she has been smoking cigarettes. She has been smoking about 0.50 packs per day. She does not have any smokeless tobacco history on file. She reports previous alcohol use. She reports current drug use. Drugs: IV and Methamphetamines.      Medications:  Prior to Admission medications    Medication Sig Start Date End Date Taking? Authorizing Provider   cefTRIAXone 2 g in sodium chloride 0.9 % 100 mL IVPB Infuse 2 g into a venous catheter Daily for 36 doses. Indications: Infection with Dysregulated Inflammatory Response 12/29/20 2/3/21 Yes Kai Mendoza, DO   miconazole (MICOTIN) 2 % vaginal cream Insert 1 applicator into the vagina Every Night for 6 doses. 12/29/20 1/4/21  Kai Mendoza, DO       Allergies:    Allergies   Allergen Reactions   • Penicillins Hives       Objective   Objective     Vital Signs  Temp:  [97.8 °F (36.6 °C)-98.1 °F  (36.7 °C)] 98.1 °F (36.7 °C)  Heart Rate:  [106-117] 111  Resp:  [16-18] 18  BP: (105-146)/() 140/106  SpO2:  [91 %-95 %] 95 %  on   ;   Device (Oxygen Therapy): room air  Body mass index is 37.09 kg/m².    Physical Exam  Constitutional:       Appearance: She is ill-appearing and toxic-appearing. She is not diaphoretic.   HENT:      Head: Normocephalic and atraumatic.      Right Ear: External ear normal.      Left Ear: External ear normal.      Nose: Nose normal.      Mouth/Throat:      Mouth: Mucous membranes are dry.   Eyes:      General: No scleral icterus.        Right eye: No discharge.      Extraocular Movements: Extraocular movements intact.      Conjunctiva/sclera: Conjunctivae normal.      Pupils: Pupils are equal, round, and reactive to light.   Cardiovascular:      Rate and Rhythm: Normal rate and regular rhythm.      Pulses: Normal pulses.      Heart sounds: Normal heart sounds. No murmur.   Pulmonary:      Effort: Pulmonary effort is normal.      Breath sounds: Rhonchi present.   Abdominal:      General: Bowel sounds are normal. There is no distension.      Palpations: Abdomen is soft.   Musculoskeletal:         General: Swelling present.      Right lower leg: No edema.      Left lower leg: No edema.      Comments: There is swelling to the left upper extremity and hand; there is no swelling noted in the lower extremities.  There is a vasculitis type rash to the bilateral lower extremities.   Skin:     General: Skin is warm and dry.      Findings: Rash present. Rash is macular and papular.      Nails: There is clubbing.          Neurological:      General: No focal deficit present.      Mental Status: She is alert and oriented to person, place, and time.   Psychiatric:      Comments: The patient is tearful at time of interview, she states she is frustrated over her health and the pain in her shoulder.         Results Review:  I have personally reviewed most recent cardiac tracings, lab results and  radiology images and interpretations and agree with findings, most notably.    Results from last 7 days   Lab Units 12/31/20 2142   WBC 10*3/mm3 8.00   HEMOGLOBIN g/dL 11.2*   HEMATOCRIT % 34.2   PLATELETS 10*3/mm3 337     Results from last 7 days   Lab Units 12/31/20 2142 12/25/20  0928   SODIUM mmol/L 135*   < > 125*   POTASSIUM mmol/L 4.0   < > 4.2   CHLORIDE mmol/L 101   < > 96*   CO2 mmol/L 25.0   < > 17.0*   BUN mg/dL 16   < > 23*   CREATININE mg/dL 1.40*   < > 0.84   GLUCOSE mg/dL 104*   < > 322*   CALCIUM mg/dL 8.5*   < > 7.6*   ALT (SGPT) U/L 26   < > 60*   AST (SGOT) U/L 25   < > 69*   TROPONIN T ng/mL <0.010  --   --    PROBNP pg/mL 8,937.0*  --   --    LACTATE mmol/L  --   --  3.1*   PROCALCITONIN ng/mL  --   --  1.04*    < > = values in this interval not displayed.     Estimated Creatinine Clearance: 60.4 mL/min (A) (by C-G formula based on SCr of 1.4 mg/dL (H)).  Brief Urine Lab Results  (Last result in the past 365 days)      Color   Clarity   Blood   Leuk Est   Nitrite   Protein   CREAT   Urine HCG        12/24/20 1520               Negative           Microbiology Results (last 10 days)     Procedure Component Value - Date/Time    Tissue / Bone Culture - Tissue, Shoulder, Left [[id number]]  (Abnormal)  (Susceptibility) Collected: 12/24/20 2156    Lab Status: Final result Specimen: Tissue from Shoulder, Left Updated: 12/27/20 0842     Tissue Culture Light growth (2+) Staphylococcus aureus     Gram Stain Many (4+) Red blood cells      Few (2+) WBCs per low power field      Few (2+) Gram positive cocci, intracellular and extracellular    Susceptibility      Staphylococcus aureus     OLIVIA     Clindamycin Susceptible     Erythromycin Resistant     Inducible Clindamycin Resistance Negative     Oxacillin Susceptible     Penicillin G Resistant     Rifampin Susceptible     Tetracycline Susceptible     Trimethoprim + Sulfamethoxazole Susceptible     Vancomycin Susceptible                Susceptibility  Comments     Staphylococcus aureus    This isolate does not demonstrate inducible clindamycin resistance in vitro.               Body Fluid Culture - Synovial Fluid, Shoulder, Left [[id number]] Collected: 12/24/20 2133    Lab Status: Final result Specimen: Synovial Fluid from Shoulder, Left Updated: 12/30/20 0624     Body Fluid Culture No growth at 5 days     Gram Stain No organisms seen    Anaerobic Culture - Synovial Fluid, Shoulder, Left [[id number]] Collected: 12/24/20 2123    Lab Status: Final result Specimen: Synovial Fluid from Shoulder, Left Updated: 12/30/20 0609     Anaerobic Culture No anaerobes isolated at 5 days    Body Fluid Culture - Aspirate, Shoulder, Left [[id number]] Collected: 12/24/20 0911    Lab Status: Final result Specimen: Aspirate from Shoulder, Left Updated: 12/29/20 0612     Body Fluid Culture No growth at 5 days     Gram Stain Many (4+) WBCs per low power field      No organisms seen    Blood Culture - Blood, Arm, Left [[id number]] Collected: 12/23/20 1631    Lab Status: Final result Specimen: Blood from Arm, Left Updated: 12/28/20 1645     Blood Culture No growth at 5 days    Respiratory Panel, PCR (WITHOUT COVID) - Swab, Nasopharynx [[id number]]  (Normal) Collected: 12/23/20 0246    Lab Status: Final result Specimen: Swab from Nasopharynx Updated: 12/23/20 0400     ADENOVIRUS, PCR Not Detected     Coronavirus 229E Not Detected     Coronavirus HKU1 Not Detected     Coronavirus NL63 Not Detected     Coronavirus OC43 Not Detected     Human Metapneumovirus Not Detected     Human Rhinovirus/Enterovirus Not Detected     Influenza B PCR Not Detected     Parainfluenza Virus 1 Not Detected     Parainfluenza Virus 2 Not Detected     Parainfluenza Virus 3 Not Detected     Parainfluenza Virus 4 Not Detected     Bordetella pertussis pcr Not Detected     Chlamydophila pneumoniae PCR Not Detected     Mycoplasma pneumo by PCR Not Detected     Influenza A PCR Not Detected     RSV, PCR Not Detected      Bordetella parapertussis PCR Not Detected    Narrative:      The coronavirus on the RVP is NOT COVID-19 and is NOT indicative of infection with COVID-19.     Blood Culture - Blood, Arm, Right [[id number]]  (Abnormal)  (Susceptibility) Collected: 12/22/20 1926    Lab Status: Final result Specimen: Blood from Arm, Right Updated: 12/25/20 0635     Blood Culture Staphylococcus aureus     Comment: Infectious disease consultation is highly recommended to rule out distant foci of infection.        Isolated from Aerobic and Anaerobic Bottles     Gram Stain Aerobic Bottle Gram positive cocci in clusters      Anaerobic Bottle Gram positive cocci in clusters    Susceptibility      Staphylococcus aureus     OLIVIA     Gentamicin Susceptible     Oxacillin Susceptible     Rifampin Susceptible     Vancomycin Susceptible                Susceptibility Comments     Staphylococcus aureus    This isolate does not demonstrate inducible clindamycin resistance in vitro.               Blood Culture ID, PCR - Blood, Arm, Right [[id number]]  (Abnormal) Collected: 12/22/20 1926    Lab Status: Final result Specimen: Blood from Arm, Right Updated: 12/23/20 1206     BCID, PCR Staphylococcus aureus. Identification by BCID PCR.     BOTTLE TYPE Aerobic Bottle    Blood Culture - Blood, Arm, Right [[id number]]  (Abnormal) Collected: 12/22/20 1925    Lab Status: Final result Specimen: Blood from Arm, Right Updated: 12/25/20 0635     Blood Culture Staphylococcus aureus     Comment: Infectious disease consultation is highly recommended to rule out distant foci of infection.        Isolated from Aerobic and Anaerobic Bottles     Gram Stain Anaerobic Bottle Gram positive cocci in clusters      Aerobic Bottle Gram positive cocci in clusters    Narrative:      Refer to blood culture collected on 12/22/2020 1926 for MICs.    COVID-19, [facility]OTT IN-HOUSE,NASAL Swab (NO TRANSPORT MEDIA) 2 HR TAT - Swab, Nasopharynx [[id number]]  (Normal) Collected: 12/22/20 1924    Lab  Status: Final result Specimen: Swab from Nasopharynx Updated: 12/22/20 1956     COVID19 Presumptive Negative    Narrative:      Fact sheet for providers: https://www.fda.gov/media/711927/download     Fact sheet for patients: https://www.fda.gov/media/165163/download    Test performed by PCR.  If inconsistent with clinical signs and symptoms patient should be tested with different authorized molecular test.    Urine Culture - Urine, Urine, Clean Catch [[id number]]  (Abnormal)  (Susceptibility) Collected: 12/22/20 1924    Lab Status: Final result Specimen: Urine, Clean Catch Updated: 12/24/20 1020     Urine Culture >100,000 CFU/mL Staphylococcus aureus     Comment: Staphylococcus aureus is not typically regarded as a uropathogen, except in cases with genitourinary instrumentation present.  It's presence suggests an alternate source (e.g. bloodstream, abscess, SSTI, etc.).  Please evaluate accordingly.       Susceptibility      Staphylococcus aureus     OLIVIA     Nitrofurantoin Susceptible     Oxacillin Susceptible     Tetracycline Susceptible     Trimethoprim + Sulfamethoxazole Susceptible     Vancomycin Susceptible                Susceptibility Comments     Staphylococcus aureus    This isolate does not demonstrate inducible clindamycin resistance in vitro.                   EKG reviewed and noted to show sinus tachycardia, I personally reviewed EKG  ECG/EMG Results (most recent)     Procedure Component Value Units Date/Time    ECG 12 Lead [[id number]] Collected: 12/31/20 2014     Updated: 12/31/20 2017     QT Interval 369 ms     Narrative:      HEART RATE= 106  bpm  RR Interval= 568  ms  MD Interval= 111  ms  P Horizontal Axis= -26  deg  P Front Axis= 53  deg  QRSD Interval= 73  ms  QT Interval= 369  ms  QRS Axis= 73  deg  T Wave Axis= 8  deg  - ABNORMAL ECG -  Sinus tachycardia  Nonspecific T abnormalities, diffuse leads  When compared with ECG of 22-Dec-2020 19:47:41,  No significant change  Electronically Signed By:    Date and Time of Study: 2020-12-31 20:14:42               Results for orders placed during the hospital encounter of 12/22/20   Adult Transthoracic Echo Complete W/ Cont if Necessary Per Protocol    Narrative · The left ventricular cavity is small in size.  · Left ventricular wall thickness is consistent with concentric   hypertrophy.  · Estimated left ventricular EF = 65% Left ventricular systolic function   is normal.  · The right ventricular cavity is mild to moderately dilated.  · The right atrial cavity is borderline dilated.  · Estimated right ventricular systolic pressure from tricuspid   regurgitation is moderately elevated (45-55 mmHg).  · Moderate pulmonary hypertension is present.          Xr Chest 1 View    Result Date: 12/31/2020   1. Cardiomegaly. 2. Interval development of right basilar consolidation felt to represent either pneumonia or atelectasis with a small pleural effusion.  Electronically Signed By-Raul Valdes MD On:12/31/2020 6:53 PM This report was finalized on [id number] by  Raul Valdes MD.    Ct Chest Pulmonary Embolism    Result Date: 12/31/2020  1. Large consolidation in the right lower lobe. This likely represents a combination of atelectasis and consolidative right lower lobe pneumonia. There is an additional consolidation in the right middle lobe also likely representing atelectasis and consolidative pneumonia. Continuous radiographic follow-up to complete resolution is recommended. 2. There is a moderately sized right-sided pleural effusion. There is a trace left-sided pleural effusion. There is an element of early pulmonary edema. 3. Subtle reticulonodular type infiltrate in the anterior aspect of the right upper lobe likely representing mild infectious bronchopneumonia. 4. Hepatomegaly with hepatic steatosis. 5. Emphysema. 6. Body wall edema. 7. No definitive pulmonary embolus. Slot 63 Electronically signed by:  Marvin Martinez M.D.  12/31/2020 8:59 PM    Mri Shoulder Left With  & Without Contrast    Result Date: 12/24/2020  1.Evidence for changes of septic arthropathy involving the left acromioclavicular joint. There is evidence for associated osteomyelitis involving the adjacent clavicle and acromion. 2.There is extensive edema and enhancement throughout the surrounding soft tissues overlying the region of the left clavicle and left shoulder. These findings indicate extensive soft tissue infection throughout this region. There is evidence for spread infection to involve the soft tissues at the base of the left neck as well as at the anterior left lateral aspect of the thoracic inlet. 3.Evidence for developing myonecrosis and possible deep muscular soft tissue abscess along the superior margin of the trapezius musculature. This area of myonecrosis and possible developing abscess measures approximately 10 cm x 1.5 cm in axial dimensions. Electronically Signed: Sohan Eng MD 12/24/2020 13:41 EST    Ir Inject/asp Large Joint Or Bursa    Result Date: 12/24/2020  Technically successful fluoroscopically guided 22-gauge left shoulder joint aspiration, as described.  Electronically Signed By-Jeane Torres MD On:12/24/2020 10:25 AM This report was finalized on [id number] by  Jeane Torres MD.        Estimated Creatinine Clearance: 60.4 mL/min (A) (by C-G formula based on SCr of 1.4 mg/dL (H)).    Assessment/Plan   Assessment/Plan       There are no hospital problems to display for this patient.    Pneumonia, right lower and middle lobe--recent hospitalization x1 week so consideration for hospital-acquired: Check respiratory viral panel to include COVID-19; add IV vancomycin pharmacy to dose; IV cefepime pharmacy to dose; check lactic acid; IV fluids normal saline bolus 1000 cc    --COVID-19 on recent admission was negative    Left upper extremity edema--likely secondary to poor venous return secondary lymphedema; DVT cannot be excluded: Ultrasound left upper extremity; therapeutic  Lovenox x1    MSSA of the left shoulder with osteomyelitis, stable on outpatient antibiotics: Hold Rocephin while on cefepime    Elevated BNP with recent echocardiogram December 2020, showing EF 65% with pulmonary hypertension and moderate tricuspid regurgitation: Repeat BNP in a.m.; hold further diuretics    --Although the patient may have CHF hypoperfusion of the bilateral knees is more consistent with volume depletion; patient had been given Lasix 40 mg IV in ER    Rash--likely vasculitis: Add aspirin 325 mg daily    VTE Prophylaxis -   Mechanical Order History:     None      Pharmalogical Order History:      Ordered     Dose Route Frequency Stop    12/31/20 5055  enoxaparin (LOVENOX) syringe 40 mg      40 mg SC Daily --                CODE STATUS:    Code Status and Medical Interventions:   Ordered at: 12/31/20 9513     Code Status:    CPR     Medical Interventions (Level of Support Prior to Arrest):    Full       This patient has been examined wearing appropriate Personal Protective Equipment. 12/31/20      I discussed the patient's findings and my recommendations with patient and nursing staff.      Signature:Electronically signed by ANTONIO Gutierrez, 01/01/21, 2:21 AM EST.      [facility] Hospitalist Team

## 2021-01-01 NOTE — OUTREACH NOTE
Sepsis Week 1 Survey      Responses   [facility] patient discharged from?  [facility]   Does the patient have one of the following disease processes/diagnoses(primary or secondary)?  Sepsis   Week 1 attempt successful?  No   Revoke  Readmitted          Pili Luciano RN

## 2021-01-01 NOTE — CONSULTS
Nephrology Consult Note                                                [facility]      Patient Identification:  Name: Radha Curtis  Age: 45 y.o.  Sex: female  :  1975  MRN: [id number]               Requesting Physician: Julian Godoy MD  Reason for Consultation: management recommendations      History of Present Illness:    Patient is a 45-year-old white female patient known to me from previous admission with history of hyponatremia UTI status post severe sepsis with debridement of trapezius muscle abscess week ago she presented to the emergency room with swelling in her left upper extremity and increased lower extremity swelling was found to have acute kidney injury with elevated creatinine and volume overload prompting renal consultation her sodium was 135 which is better than what it was before  Problem List:  Patient Active Problem List   Diagnosis   • Severe sepsis (CMS/[facility])   • Allergic rhinitis   • Anxiety   • Cervical radiculopathy   • Chronic low back pain   • Depression   • Hidradenitis suppurativa   • Hyperlipidemia   • Hypertension   • Insomnia due to mental disorder   • Restless leg syndrome   • Tobacco use   • Type 2 diabetes mellitus without complications (CMS/[facility])   • Drug abuse, IV (CMS/[facility])   • Septic arthritis of AC joint (CMS/[facility])   • MRSA bacteremia   • S/P debridement   • MSSA bacteremia   • Congestive heart failure (CMS/[facility])     Past Medical History:  Past Medical History:   Diagnosis Date   • Allergic rhinitis 10/1/2015   • Anxiety 2020   • Cervical radiculopathy 2020   • Chronic low back pain 2015   • Depression 2020   • Drug abuse, IV (CMS/[facility]) 2020   • Hidradenitis suppurativa 2013   • Hyperlipidemia 2015   • Hypertension 2020   • Insomnia due to mental disorder 10/17/2014   • Restless leg syndrome 2015   • Tobacco use 2015   • Type 2 diabetes mellitus without complications  (CMS/[facility]) 12/22/2020     Past Surgical History:  Past Surgical History:   Procedure Laterality Date   • ARM DEBRIDEMENT Left 12/24/2020    Procedure: Arthrotomy of left shoulder with irrigation and decompression of trapezial abscess with irrigation and debridement;  Surgeon: Clayton Bergeron MD;  Location: [facility];  Service: Orthopedics;  Laterality: Left;   • SHOULDER ACROMIOCLAVICULAR JOINT REPAIR Left 12/24/2020    Procedure: SHOULDER ACROMIOCLAVICULAR JOINT RESECTION;  Surgeon: Clayton Bergeron MD;  Location: [facility] OR;  Service: Orthopedics;  Laterality: Left;      Home Meds:  Medications Prior to Admission   Medication Sig Dispense Refill Last Dose   • cefTRIAXone 2 g in sodium chloride 0.9 % 100 mL IVPB Infuse 2 g into a venous catheter Daily for 36 doses. Indications: Infection with Dysregulated Inflammatory Response      • miconazole (MICOTIN) 2 % vaginal cream Insert 1 applicator into the vagina Every Night for 6 doses. 6 g 0      Current Meds:     Current Facility-Administered Medications:   •  acetaminophen (TYLENOL) tablet 650 mg, 650 mg, Oral, Q4H PRN **OR** acetaminophen (TYLENOL) 160 MG/5ML solution 650 mg, 650 mg, Oral, Q4H PRN **OR** acetaminophen (TYLENOL) suppository 650 mg, 650 mg, Rectal, Q4H PRN, Hillary Vale FNP  •  aspirin EC tablet 325 mg, 325 mg, Oral, Daily, Hillary Vale FNP, 325 mg at 01/01/21 0316  •  DAPTOmycin (CUBICIN) 450 mg in sodium chloride 0.9 % 50 mL IVPB, 6 mg/kg (Adjusted), Intravenous, Q24H, Rossana Wilder APRN  •  diphenhydrAMINE (BENADRYL) injection 25 mg, 25 mg, Intravenous, Q6H PRN, Rossana Wilder APRN  •  diphenhydrAMINE-zinc acetate 2-0.1 % cream, , Topical, TID PRN, Rossana Wilder APRN  •  furosemide (LASIX) injection 20 mg, 20 mg, Intravenous, BID, WaltJulian MD, 20 mg at 01/01/21 1035  •  Linezolid (ZYVOX) 600 mg 300 mL, 600 mg, Intravenous, Q12H, Rossana Wilder, ALEXX  •  ondansetron (ZOFRAN) tablet  "4 mg, 4 mg, Oral, Q6H PRN **OR** ondansetron (ZOFRAN) injection 4 mg, 4 mg, Intravenous, Q6H PRN, Hillary Vale FNP  •  oxyCODONE (ROXICODONE) immediate release tablet 5 mg, 5 mg, Oral, Q4H PRN, Julian Godoy MD, 5 mg at 01/01/21 1154  •  sodium chloride 0.9 % flush 10 mL, 10 mL, Intravenous, Q12H, Hillary Vale FNP, 10 mL at 01/01/21 1035  •  sodium chloride 0.9 % flush 10 mL, 10 mL, Intravenous, PRN, Hillary Vale FNP    Allergies:  Allergies   Allergen Reactions   • Rocephin [Ceftriaxone] Rash   • Penicillins Hives     Social History:   Social History     Tobacco Use   • Smoking status: Current Every Day Smoker     Packs/day: 0.50     Types: Cigarettes   Substance Use Topics   • Alcohol use: Not Currently      Family History:  Family History   Problem Relation Age of Onset   • Diabetes Mother    • Heart disease Mother    • COPD Father         Review of Systems  Reviewed 12 systems were reviewed, all negative except for those mentioned in HPI    Objective:  Vitals:   /94 (BP Location: Right arm, Patient Position: Lying)   Pulse 108   Temp 98.5 °F (36.9 °C) (Oral)   Resp 18   Ht 165.1 cm (65\")   Wt 101 kg (222 lb 14.2 oz)   SpO2 91%   BMI 37.09 kg/m²   I/O:     Intake/Output Summary (Last 24 hours) at 1/1/2021 1430  Last data filed at 1/1/2021 1157  Gross per 24 hour   Intake 2320 ml   Output --   Net 2320 ml       Exam:  General Appearance:  Alert  Head:  Normocephalic, without obvious abnormality, atraumatic  Eyes:  PERRL, conjunctiva/corneas clear     Neck:  Supple,  no adenopathy;      Lungs:  Decreased BS occasion ronchi  Heart:  Regular rate and rhythm, S1 and S2 normal  Abdomen:  Soft, non-tender, bowel sounds active   Extremities: trace edema  Pulses: 2+ and symmetric all extremities  Skin:  No rashes or lesions  Data Review:  All labs (24hrs):   Recent Results (from the past 24 hour(s))   ECG 12 Lead    Collection Time: 12/31/20  8:14 PM   Result Value Ref Range    QT Interval " 369 ms   Comprehensive Metabolic Panel    Collection Time: 12/31/20  9:42 PM    Specimen: Blood   Result Value Ref Range    Glucose 104 (H) 65 - 99 mg/dL    BUN 16 6 - 20 mg/dL    Creatinine 1.40 (H) 0.57 - 1.00 mg/dL    Sodium 135 (L) 136 - 145 mmol/L    Potassium 4.0 3.5 - 5.2 mmol/L    Chloride 101 98 - 107 mmol/L    CO2 25.0 22.0 - 29.0 mmol/L    Calcium 8.5 (L) 8.6 - 10.5 mg/dL    Total Protein 6.3 6.0 - 8.5 g/dL    Albumin 3.00 (L) 3.50 - 5.20 g/dL    ALT (SGPT) 26 1 - 33 U/L    AST (SGOT) 25 1 - 32 U/L    Alkaline Phosphatase 136 (H) 39 - 117 U/L    Total Bilirubin 0.5 0.0 - 1.2 mg/dL    eGFR Non African Amer 41 (L) >60 mL/min/1.73    Globulin 3.3 gm/dL    A/G Ratio 0.9 g/dL    BUN/Creatinine Ratio 11.4 7.0 - 25.0    Anion Gap 9.0 5.0 - 15.0 mmol/L   BNP    Collection Time: 12/31/20  9:42 PM    Specimen: Blood   Result Value Ref Range    proBNP 8,937.0 (H) 0.0 - 450.0 pg/mL   CBC Auto Differential    Collection Time: 12/31/20  9:42 PM    Specimen: Blood   Result Value Ref Range    WBC 8.00 3.40 - 10.80 10*3/mm3    RBC 3.65 (L) 3.77 - 5.28 10*6/mm3    Hemoglobin 11.2 (L) 12.0 - 15.9 g/dL    Hematocrit 34.2 34.0 - 46.6 %    MCV 93.7 79.0 - 97.0 fL    MCH 30.7 26.6 - 33.0 pg    MCHC 32.7 31.5 - 35.7 g/dL    RDW 17.2 (H) 12.3 - 15.4 %    RDW-SD 57.3 (H) 37.0 - 54.0 fl    MPV 7.8 6.0 - 12.0 fL    Platelets 337 140 - 450 10*3/mm3    Neutrophil % 69.8 42.7 - 76.0 %    Lymphocyte % 18.9 (L) 19.6 - 45.3 %    Monocyte % 8.9 5.0 - 12.0 %    Eosinophil % 1.7 0.3 - 6.2 %    Basophil % 0.7 0.0 - 1.5 %    Neutrophils, Absolute 5.60 1.70 - 7.00 10*3/mm3    Lymphocytes, Absolute 1.50 0.70 - 3.10 10*3/mm3    Monocytes, Absolute 0.70 0.10 - 0.90 10*3/mm3    Eosinophils, Absolute 0.10 0.00 - 0.40 10*3/mm3    Basophils, Absolute 0.10 0.00 - 0.20 10*3/mm3    nRBC 0.0 0.0 - 0.2 /100 WBC   Troponin    Collection Time: 12/31/20  9:42 PM    Specimen: Blood   Result Value Ref Range    Troponin T <0.010 0.000 - 0.030 ng/mL    Procalcitonin    Collection Time: 12/31/20  9:42 PM    Specimen: Blood   Result Value Ref Range    Procalcitonin 0.25 0.00 - 0.25 ng/mL   Respiratory Panel PCR w/COVID-19(SARS-CoV-2) CARRIE/BARRETT/KANDY/PAD/COR/MAD/MAXIMILIANO In-House, NP Swab in UTM/VTM, 3-4 HR TAT - Swab, Nasopharynx    Collection Time: 01/01/21  1:43 AM    Specimen: Nasopharynx; Swab   Result Value Ref Range    ADENOVIRUS, PCR Not Detected Not Detected    Coronavirus 229E Not Detected Not Detected    Coronavirus HKU1 Not Detected Not Detected    Coronavirus NL63 Not Detected Not Detected    Coronavirus OC43 Not Detected Not Detected    COVID19 Not Detected Not Detected - Ref. Range    Human Metapneumovirus Not Detected Not Detected    Human Rhinovirus/Enterovirus Not Detected Not Detected    Influenza A PCR Not Detected Not Detected    Influenza B PCR Not Detected Not Detected    Parainfluenza Virus 1 Not Detected Not Detected    Parainfluenza Virus 2 Not Detected Not Detected    Parainfluenza Virus 3 Not Detected Not Detected    Parainfluenza Virus 4 Not Detected Not Detected    RSV, PCR Not Detected Not Detected    Bordetella pertussis pcr Not Detected Not Detected    Bordetella parapertussis PCR Not Detected Not Detected    Chlamydophila pneumoniae PCR Not Detected Not Detected    Mycoplasma pneumo by PCR Not Detected Not Detected   POC Lactate    Collection Time: 01/01/21  1:46 AM    Specimen: Blood   Result Value Ref Range    Lactate 0.9 0.5 - 2.0 mmol/L   Basic Metabolic Panel    Collection Time: 01/01/21  2:12 AM    Specimen: Blood   Result Value Ref Range    Glucose 102 (H) 65 - 99 mg/dL    BUN 15 6 - 20 mg/dL    Creatinine 1.42 (H) 0.57 - 1.00 mg/dL    Sodium 135 (L) 136 - 145 mmol/L    Potassium 4.1 3.5 - 5.2 mmol/L    Chloride 101 98 - 107 mmol/L    CO2 25.0 22.0 - 29.0 mmol/L    Calcium 8.1 (L) 8.6 - 10.5 mg/dL    eGFR Non African Amer 40 (L) >60 mL/min/1.73    BUN/Creatinine Ratio 10.6 7.0 - 25.0    Anion Gap 9.0 5.0 - 15.0 mmol/L   CBC Auto  Differential    Collection Time: 01/01/21  2:12 AM    Specimen: Blood   Result Value Ref Range    WBC 7.10 3.40 - 10.80 10*3/mm3    RBC 3.32 (L) 3.77 - 5.28 10*6/mm3    Hemoglobin 10.2 (L) 12.0 - 15.9 g/dL    Hematocrit 31.0 (L) 34.0 - 46.6 %    MCV 93.4 79.0 - 97.0 fL    MCH 30.6 26.6 - 33.0 pg    MCHC 32.8 31.5 - 35.7 g/dL    RDW 17.3 (H) 12.3 - 15.4 %    RDW-SD 56.9 (H) 37.0 - 54.0 fl    MPV 7.4 6.0 - 12.0 fL    Platelets 299 140 - 450 10*3/mm3    Neutrophil % 71.3 42.7 - 76.0 %    Lymphocyte % 15.7 (L) 19.6 - 45.3 %    Monocyte % 10.9 5.0 - 12.0 %    Eosinophil % 1.6 0.3 - 6.2 %    Basophil % 0.5 0.0 - 1.5 %    Neutrophils, Absolute 5.00 1.70 - 7.00 10*3/mm3    Lymphocytes, Absolute 1.10 0.70 - 3.10 10*3/mm3    Monocytes, Absolute 0.80 0.10 - 0.90 10*3/mm3    Eosinophils, Absolute 0.10 0.00 - 0.40 10*3/mm3    Basophils, Absolute 0.00 0.00 - 0.20 10*3/mm3    nRBC 0.0 0.0 - 0.2 /100 WBC   TSH    Collection Time: 01/01/21  2:12 AM    Specimen: Blood   Result Value Ref Range    TSH 1.900 0.270 - 4.200 uIU/mL   Troponin    Collection Time: 01/01/21  2:12 AM    Specimen: Blood   Result Value Ref Range    Troponin T <0.010 0.000 - 0.030 ng/mL   BNP    Collection Time: 01/01/21  2:12 AM    Specimen: Blood   Result Value Ref Range    proBNP 7,980.0 (H) 0.0 - 450.0 pg/mL   Duplex Venous Upper Extremity - Left CAR    Collection Time: 01/01/21  9:18 AM   Result Value Ref Range    Right Internal Jugular Augment Y     Right Internal Jugular Competent Y     Right Internal Jugular Compress C     Right Internal Jugular Phasic Y     Right Internal Jugular Spont Y     Left Internal Jugular Augment Y     Left Internal Jugular Competent Y     Left Internal Jugular Compress C     Left Internal Jugular Phasic Y     Left Internal Jugular Spont Y     Right Subclavian Augment Y     Right Subclavian Competent Y     Right Subclavian Compress C     Right Subclavian Phasic Y     Right Subclavian Spont Y     Left Subclavian Augment Y     Left  Subclavian Competent Y     Left Subclavian Phasic Y     Left Subclavian Spont Y     Left Brachial Compress C     Left Radial Compress C     Left Ulnar Compress C     Left Basilic Upper Compress N     Left Basilic Upper Thrombus A     Left Basilic Upper Color 1     Left Basilic Forearm Compress P     Left Basilic Forearm Thrombus A     Left Basilic Forearm Color 1     Left Cephalic Upper Compress N     Left Cephalic Upper Thrombus A     Left Cephalic Upper Color 1     Left Cephalic Forearm Compress N     Left Cephalic Forearm Thrombus A     Left Cephalic Forearm Color 1     UPPER VENOUS LEFT MED CUBITAL COMPRESS N     UPPER VENOUS LEFT MED CUBITAL THROMBUS A     UPPER VENOUS LEFT MED CUBITAL COLOR 1    C-reactive Protein    Collection Time: 01/01/21 10:54 AM    Specimen: Blood   Result Value Ref Range    C-Reactive Protein 7.75 (H) 0.00 - 0.50 mg/dL   hCG, Serum, Qualitative    Collection Time: 01/01/21 10:54 AM    Specimen: Blood   Result Value Ref Range    HCG Qualitative Negative Negative       Current Facility-Administered Medications:   •  acetaminophen (TYLENOL) tablet 650 mg, 650 mg, Oral, Q4H PRN **OR** acetaminophen (TYLENOL) 160 MG/5ML solution 650 mg, 650 mg, Oral, Q4H PRN **OR** acetaminophen (TYLENOL) suppository 650 mg, 650 mg, Rectal, Q4H PRN, Hillary Vale FNP  •  aspirin EC tablet 325 mg, 325 mg, Oral, Daily, Hillary Vale FNP, 325 mg at 01/01/21 0316  •  DAPTOmycin (CUBICIN) 450 mg in sodium chloride 0.9 % 50 mL IVPB, 6 mg/kg (Adjusted), Intravenous, Q24H, Rossana Wilder APRN  •  diphenhydrAMINE (BENADRYL) injection 25 mg, 25 mg, Intravenous, Q6H PRN, Rossana Wilder APRN  •  diphenhydrAMINE-zinc acetate 2-0.1 % cream, , Topical, TID PRN, Rossana Wilder APRN  •  furosemide (LASIX) injection 20 mg, 20 mg, Intravenous, BID, WaltJulian MD, 20 mg at 01/01/21 1035  •  Linezolid (ZYVOX) 600 mg 300 mL, 600 mg, Intravenous, Q12H, Rossana Wilder APRN  •   ondansetron (ZOFRAN) tablet 4 mg, 4 mg, Oral, Q6H PRN **OR** ondansetron (ZOFRAN) injection 4 mg, 4 mg, Intravenous, Q6H PRN, Hillary Vale FNP  •  oxyCODONE (ROXICODONE) immediate release tablet 5 mg, 5 mg, Oral, Q4H PRN, Julian Godoy MD, 5 mg at 01/01/21 1154  •  sodium chloride 0.9 % flush 10 mL, 10 mL, Intravenous, Q12H, Hillary Vale FNP, 10 mL at 01/01/21 1035  •  sodium chloride 0.9 % flush 10 mL, 10 mL, Intravenous, PRN, Hillary Vale FNP    Assessment:  Acute kidney injury  Volume excess  Status post hyponatremia  Pneumonia  Sepsis  Left upper extremity edema to rule out DVT  MSSA bacteremia from the left shoulder with osteomyelitis  Possible heart failure      Recommendations:  Patient's volume is increased I would definitely add diuretics avoid nephrotoxic medications  Check urine studies  Check CPK to rule out rhabdomyolysis especially with daptomycin on board  Medication with normal saline  Avoid IV contrast if possible      Maicol Anderson MD  1/1/2021  14:30 EST

## 2021-01-02 LAB
ALBUMIN SERPL-MCNC: 2.5 G/DL (ref 3.5–5.2)
ALBUMIN/GLOB SERPL: 0.8 G/DL
ALP SERPL-CCNC: 117 U/L (ref 39–117)
ALT SERPL W P-5'-P-CCNC: 18 U/L (ref 1–33)
ANION GAP SERPL CALCULATED.3IONS-SCNC: 9 MMOL/L (ref 5–15)
AST SERPL-CCNC: 25 U/L (ref 1–32)
BACTERIA SPEC AEROBE CULT: NO GROWTH
BASOPHILS # BLD AUTO: 0.1 10*3/MM3 (ref 0–0.2)
BASOPHILS NFR BLD AUTO: 0.9 % (ref 0–1.5)
BILIRUB SERPL-MCNC: 0.4 MG/DL (ref 0–1.2)
BUN SERPL-MCNC: 14 MG/DL (ref 6–20)
BUN/CREAT SERPL: 9.8 (ref 7–25)
CALCIUM SPEC-SCNC: 8 MG/DL (ref 8.6–10.5)
CHLORIDE SERPL-SCNC: 101 MMOL/L (ref 98–107)
CK SERPL-CCNC: 94 U/L (ref 20–180)
CO2 SERPL-SCNC: 24 MMOL/L (ref 22–29)
CREAT SERPL-MCNC: 1.43 MG/DL (ref 0.57–1)
CRP SERPL-MCNC: 5.85 MG/DL (ref 0–0.5)
DEPRECATED RDW RBC AUTO: 57.8 FL (ref 37–54)
EOSINOPHIL # BLD AUTO: 0.2 10*3/MM3 (ref 0–0.4)
EOSINOPHIL NFR BLD AUTO: 2.7 % (ref 0.3–6.2)
ERYTHROCYTE [DISTWIDTH] IN BLOOD BY AUTOMATED COUNT: 17.3 % (ref 12.3–15.4)
GFR SERPL CREATININE-BSD FRML MDRD: 40 ML/MIN/1.73
GLOBULIN UR ELPH-MCNC: 3.1 GM/DL
GLUCOSE SERPL-MCNC: 95 MG/DL (ref 65–99)
HCT VFR BLD AUTO: 32.1 % (ref 34–46.6)
HGB BLD-MCNC: 10.6 G/DL (ref 12–15.9)
LYMPHOCYTES # BLD AUTO: 1.7 10*3/MM3 (ref 0.7–3.1)
LYMPHOCYTES NFR BLD AUTO: 23.4 % (ref 19.6–45.3)
MCH RBC QN AUTO: 31.4 PG (ref 26.6–33)
MCHC RBC AUTO-ENTMCNC: 33.1 G/DL (ref 31.5–35.7)
MCV RBC AUTO: 95 FL (ref 79–97)
MONOCYTES # BLD AUTO: 0.8 10*3/MM3 (ref 0.1–0.9)
MONOCYTES NFR BLD AUTO: 10.4 % (ref 5–12)
NEUTROPHILS NFR BLD AUTO: 4.6 10*3/MM3 (ref 1.7–7)
NEUTROPHILS NFR BLD AUTO: 62.6 % (ref 42.7–76)
NRBC BLD AUTO-RTO: 0 /100 WBC (ref 0–0.2)
PLATELET # BLD AUTO: 267 10*3/MM3 (ref 140–450)
PMV BLD AUTO: 7.9 FL (ref 6–12)
POTASSIUM SERPL-SCNC: 4 MMOL/L (ref 3.5–5.2)
PROT SERPL-MCNC: 5.6 G/DL (ref 6–8.5)
QT INTERVAL: 369 MS
RBC # BLD AUTO: 3.38 10*6/MM3 (ref 3.77–5.28)
SODIUM SERPL-SCNC: 134 MMOL/L (ref 136–145)
WBC # BLD AUTO: 7.3 10*3/MM3 (ref 3.4–10.8)

## 2021-01-02 PROCEDURE — 85025 COMPLETE CBC W/AUTO DIFF WBC: CPT | Performed by: INTERNAL MEDICINE

## 2021-01-02 PROCEDURE — 99233 SBSQ HOSP IP/OBS HIGH 50: CPT | Performed by: INTERNAL MEDICINE

## 2021-01-02 PROCEDURE — 82550 ASSAY OF CK (CPK): CPT | Performed by: NURSE PRACTITIONER

## 2021-01-02 PROCEDURE — 25010000002 LINEZOLID 600 MG/300ML SOLUTION: Performed by: NURSE PRACTITIONER

## 2021-01-02 PROCEDURE — 86140 C-REACTIVE PROTEIN: CPT | Performed by: INTERNAL MEDICINE

## 2021-01-02 PROCEDURE — G0378 HOSPITAL OBSERVATION PER HR: HCPCS

## 2021-01-02 PROCEDURE — 25010000002 FUROSEMIDE PER 20 MG: Performed by: INTERNAL MEDICINE

## 2021-01-02 PROCEDURE — 80053 COMPREHEN METABOLIC PANEL: CPT | Performed by: INTERNAL MEDICINE

## 2021-01-02 PROCEDURE — 25010000002 DAPTOMYCIN PER 1 MG: Performed by: NURSE PRACTITIONER

## 2021-01-02 RX ADMIN — LINEZOLID 600 MG: 600 INJECTION, SOLUTION INTRAVENOUS at 05:28

## 2021-01-02 RX ADMIN — Medication 10 ML: at 21:48

## 2021-01-02 RX ADMIN — OXYCODONE 5 MG: 5 TABLET ORAL at 00:44

## 2021-01-02 RX ADMIN — OXYCODONE 5 MG: 5 TABLET ORAL at 15:21

## 2021-01-02 RX ADMIN — FUROSEMIDE 20 MG: 10 INJECTION, SOLUTION INTRAMUSCULAR; INTRAVENOUS at 09:12

## 2021-01-02 RX ADMIN — OXYCODONE 5 MG: 5 TABLET ORAL at 09:49

## 2021-01-02 RX ADMIN — FUROSEMIDE 20 MG: 10 INJECTION, SOLUTION INTRAMUSCULAR; INTRAVENOUS at 17:19

## 2021-01-02 RX ADMIN — OXYCODONE 5 MG: 5 TABLET ORAL at 19:50

## 2021-01-02 RX ADMIN — ASPIRIN 325 MG: 325 TABLET ORAL at 09:12

## 2021-01-02 RX ADMIN — LINEZOLID 600 MG: 600 INJECTION, SOLUTION INTRAVENOUS at 17:20

## 2021-01-02 RX ADMIN — DAPTOMYCIN 450 MG: 500 INJECTION, POWDER, LYOPHILIZED, FOR SOLUTION INTRAVENOUS at 17:20

## 2021-01-02 RX ADMIN — OXYCODONE 5 MG: 5 TABLET ORAL at 05:27

## 2021-01-02 RX ADMIN — Medication 10 ML: at 09:13

## 2021-01-02 NOTE — PLAN OF CARE
Goal Outcome Evaluation:  Plan of Care Reviewed With: patient  Progress: no change  Outcome Summary: Pt continuing to rest between care. Pain treated with PRN PO orders. VSS. Antibiotics continuing. Plan ongoing.

## 2021-01-02 NOTE — PROGRESS NOTES
" COVERING FOR   RENAL PROGRESS NOTE    [facility]  [phone]  Stacie Garcia MD      Patient Care Team:  Danie Dee MD as PCP - General      Provider:  Stacie Garcia MD  Patient Name: Radha Curtis  :  1975    SUBJECTIVE:    F/U CHF/PELON    Feeling/breathing okay. Anxious to go home. No dysuria.    Medication:  aspirin, 325 mg, Oral, Daily  DAPTOmycin, 6 mg/kg (Adjusted), Intravenous, Q24H  furosemide, 20 mg, Intravenous, BID  Linezolid, 600 mg, Intravenous, Q12H  sodium chloride, 10 mL, Intravenous, Q12H           OBJECTIVE    Vital Sign Min/Max for last 24 hours  Temp  Min: 97.8 °F (36.6 °C)  Max: 98.5 °F (36.9 °C)   BP  Min: 109/72  Max: 152/99   Pulse  Min: 100  Max: 111   Resp  Min: 18  Max: 23   SpO2  Min: 90 %  Max: 95 %   No data recorded   Weight  Min: 102 kg (225 lb 5 oz)  Max: 102 kg (225 lb 5 oz)     Flowsheet Rows      First Filed Value   Admission Height  165.1 cm (65\") Documented at 2020   Admission Weight  101 kg (222 lb 14.2 oz) Documented at 2020 1712          I/O this shift:  In: 240 [P.O.:240]  Out: 400 [Urine:400]  I/O last 3 completed shifts:  In: 2800 [P.O.:1200; IV Piggyback:1600]  Out: 400 [Urine:400]    Physical Exam:  General Appearance: alert, appears stated age and cooperative  Head: normocephalic, without obvious abnormality and atraumatic  Eyes: conjunctivae and sclerae normal and no icterus  Neck: supple and no JVD  Lungs: clear to auscultation and respirations regular  Heart: regular rhythm & normal rate and normal S1, S2 +ERENDIRA  Chest: Wall no abnormalities observed  Abdomen: normal bowel sounds and soft non-tender +OBESITY  Extremities: moves extremities well, no edema, no cyanosis and no redness  Skin: no bleeding, bruising or rash, turgor normal, color normal and no lesions noted  Neurologic: Alert, and oriented. No focal deficits    Labs:    WBC WBC   Date Value Ref Range Status "   01/02/2021 7.30 3.40 - 10.80 10*3/mm3 Final   01/01/2021 7.10 3.40 - 10.80 10*3/mm3 Final   12/31/2020 8.00 3.40 - 10.80 10*3/mm3 Final      HGB Hemoglobin   Date Value Ref Range Status   01/02/2021 10.6 (L) 12.0 - 15.9 g/dL Final   01/01/2021 10.2 (L) 12.0 - 15.9 g/dL Final   12/31/2020 11.2 (L) 12.0 - 15.9 g/dL Final      HCT Hematocrit   Date Value Ref Range Status   01/02/2021 32.1 (L) 34.0 - 46.6 % Final   01/01/2021 31.0 (L) 34.0 - 46.6 % Final   12/31/2020 34.2 34.0 - 46.6 % Final      Platlets No results found for: LABPLAT   MCV MCV   Date Value Ref Range Status   01/02/2021 95.0 79.0 - 97.0 fL Final   01/01/2021 93.4 79.0 - 97.0 fL Final   12/31/2020 93.7 79.0 - 97.0 fL Final          Sodium Sodium   Date Value Ref Range Status   01/02/2021 134 (L) 136 - 145 mmol/L Final   01/01/2021 135 (L) 136 - 145 mmol/L Final   12/31/2020 135 (L) 136 - 145 mmol/L Final      Potassium Potassium   Date Value Ref Range Status   01/02/2021 4.0 3.5 - 5.2 mmol/L Final   01/01/2021 4.1 3.5 - 5.2 mmol/L Final   12/31/2020 4.0 3.5 - 5.2 mmol/L Final      Chloride Chloride   Date Value Ref Range Status   01/02/2021 101 98 - 107 mmol/L Final   01/01/2021 101 98 - 107 mmol/L Final   12/31/2020 101 98 - 107 mmol/L Final      CO2 CO2   Date Value Ref Range Status   01/02/2021 24.0 22.0 - 29.0 mmol/L Final   01/01/2021 25.0 22.0 - 29.0 mmol/L Final   12/31/2020 25.0 22.0 - 29.0 mmol/L Final      BUN BUN   Date Value Ref Range Status   01/02/2021 14 6 - 20 mg/dL Final   01/01/2021 15 6 - 20 mg/dL Final   12/31/2020 16 6 - 20 mg/dL Final      Creatinine Creatinine   Date Value Ref Range Status   01/02/2021 1.43 (H) 0.57 - 1.00 mg/dL Final   01/01/2021 1.42 (H) 0.57 - 1.00 mg/dL Final   12/31/2020 1.40 (H) 0.57 - 1.00 mg/dL Final      Calcium Calcium   Date Value Ref Range Status   01/02/2021 8.0 (L) 8.6 - 10.5 mg/dL Final   01/01/2021 8.1 (L) 8.6 - 10.5 mg/dL Final   12/31/2020 8.5 (L) 8.6 - 10.5 mg/dL Final      PO4 No components  found for: PO4   Albumin Albumin   Date Value Ref Range Status   01/02/2021 2.50 (L) 3.50 - 5.20 g/dL Final   12/31/2020 3.00 (L) 3.50 - 5.20 g/dL Final      Magnesium No results found for: MG   Uric Acid No components found for: URIC ACID     Imaging Results (Last 72 Hours)     Procedure Component Value Units Date/Time    CT Chest Pulmonary Embolism [[id number]] Collected: 12/31/20 2054     Updated: 12/31/20 2300    Narrative:      Exam: CTA thorax, PE protocol    Date: December 31, 2020    History: Bilateral lower extremity edema, shortness of breath, recent shoulder surgery    Comparison: None available    Technique: Contiguous axial CT images obtained of the thorax following the uneventful administration of 69 mL Isovue-370 contrast. Additionally, sagittal, coronal and 3-D reformatted images were obtained. CT dose lowering techniques were used, to   include: automated exposure control, adjustment for patient size, and or use of iterative reconstruction.    Findings:    Thoracic aorta: There is no aneurysm or dissection.    HEART: The heart is not enlarged.    Pulmonary arteries: The pulmonary arteries are reasonably well visualized. There is some limitation secondary to respiratory motion artifact. There is no filling defect to suggest an acute pulmonary embolus.    Mediastinum: There is no pathologic mediastinal adenopathy.    Lung parenchyma: There is a background of emphysema. There is a mild reticulonodular type infiltrate in the anterior aspect of the right upper lobe. There is a moderately sized right-sided pleural effusion. There is a large consolidation in the right   lower lobe. There is also a consolidation in the right middle lobe. The lung volumes are markedly diminished. There is an element of interlobular septal thickening in the periphery of both lungs. There is a trace left-sided pleural effusion.    Upper abdomen: The gallbladder is surgically absent. The liver is enlarged and diffusely  hypodense.    MUSCULOSKELETAL: There is body wall edema. No acute fractures are identified.      Impression:      1. Large consolidation in the right lower lobe. This likely represents a combination of atelectasis and consolidative right lower lobe pneumonia. There is an additional consolidation in the right middle lobe also likely representing atelectasis and   consolidative pneumonia. Continuous radiographic follow-up to complete resolution is recommended.  2. There is a moderately sized right-sided pleural effusion. There is a trace left-sided pleural effusion. There is an element of early pulmonary edema.  3. Subtle reticulonodular type infiltrate in the anterior aspect of the right upper lobe likely representing mild infectious bronchopneumonia.   4. Hepatomegaly with hepatic steatosis.  5. Emphysema.  6. Body wall edema.  7. No definitive pulmonary embolus.        Slot 63    Electronically signed by:  Marvin Martinez M.D.    12/31/2020 8:59 PM    XR Chest 1 View [[id number]] Collected: 12/31/20 1852     Updated: 12/31/20 1855    Narrative:      DATE OF EXAM:  12/31/2020 6:44 PM     PROCEDURE:  XR CHEST 1 VW-     INDICATIONS:  Shortness of breath, lower extremity swelling, hypertension.      COMPARISON:  12/22/2020.     TECHNIQUE:   Single radiographic view of the chest was obtained.     FINDINGS:  The heart is enlarged. There is abnormal right basilar consolidation  felt to represent either pneumonia or atelectasis with a small pleural  effusion. The left lung and pleural space are clear. The pulmonary  vascular markings are normal.  The bony thorax is normal for age.       Impression:         1. Cardiomegaly.  2. Interval development of right basilar consolidation felt to represent  either pneumonia or atelectasis with a small pleural effusion.     Electronically Signed By-Raul Valdes MD On:12/31/2020 6:53 PM  This report was finalized on [id number] by  Raul Valdes MD.          Results for orders placed  during the hospital encounter of 12/31/20   XR Chest 1 View    Narrative DATE OF EXAM:  12/31/2020 6:44 PM     PROCEDURE:  XR CHEST 1 VW-     INDICATIONS:  Shortness of breath, lower extremity swelling, hypertension.      COMPARISON:  12/22/2020.     TECHNIQUE:   Single radiographic view of the chest was obtained.     FINDINGS:  The heart is enlarged. There is abnormal right basilar consolidation  felt to represent either pneumonia or atelectasis with a small pleural  effusion. The left lung and pleural space are clear. The pulmonary  vascular markings are normal.  The bony thorax is normal for age.       Impression    1. Cardiomegaly.  2. Interval development of right basilar consolidation felt to represent  either pneumonia or atelectasis with a small pleural effusion.     Electronically Signed By-Raul Valdes MD On:12/31/2020 6:53 PM  This report was finalized on [id number] by  Raul Valdes MD.       Results for orders placed during the hospital encounter of 12/31/20   Duplex Venous Upper Extremity - Left CAR    Narrative · Acute left upper extremity superficial thrombophlebitis noted in the   basilic (upper arm), basilic (forearm), cephalic (upper arm), cephalic   (forearm) and median cubital.  · All other left sided vessels appear normal.            ASSESSMENT / PLAN      Congestive heart failure (CMS/HCC)      Acute kidney injury with stable BUN/Cr. Acid-base, lytes okay.  Volume excess improving.  Hyponatremia----Stable with diuresis  Pneumonia/Sepsis clinically improved.  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  HTN with stable BP.                Stacie Garcia MD  [facility]  [phone]  01/02/21  09:18 EST

## 2021-01-02 NOTE — PROGRESS NOTES
[facility] Daily Progress Note      Hospitalist Team  LOS 0 days      Patient Care Team:  Danie Dee MD as PCP - General    Patient Location: 4125/1      Subjective   Subjective     Chief Complaint / Subjective  Chief Complaint   Patient presents with   • Leg Swelling         Brief Synopsis of Hospital Course/HPI    45-year-old female presents to the ER with chief complaint of swelling to her left upper extremity and bilateral lower extremities as well as rash to her bilateral lower extremities which started today.  The patient has had cough without sputum production.  She denies subjective fever or chills.  The patient adamantly denies IV drug use although it is documented in review of records.  The patient states she does not know how that ended up in her records.  She reiterates that she has not ever, not even once, used IV drugs.       Recent hospital admission 12/22/2020 through 12/29/2020 for severe sepsis, UTI, hyponatremia.  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  Drain was left in and removed on 12/29/2020.  MSSA culture with recommendation for Rocephin x6 weeks.  Patient had MYLES 12/22/2020 showing LV function 65%, moderate TR, moderate pulmonary hypertension.  No vegetations seen.  Status post MRI of shoulder 12/24/2020 showing osteomyelitis.  Edema versus abscess.            Date::    1/1/21  Hurting and crying fromlt ue pain  Pending v doppler ule  Resumed diuretics  Add pain meds  reconsult id   Consult rnal foraki  Check ua  chek pulse ule  .  .  1/2/21  Swelling left upper extremity improved  Venous Doppler left upper extremity showed superficial thrombophlebitis without DVT  Rash discussed with Dr. Gibbons   Will repeat 2D echo and may need MYLES    ROS  All other systems reviewed and negative    Objective   Objective      Vital Signs  Temp:  [97.8 °F (36.6 °C)-98.3 °F (36.8 °C)] 98.3 °F (36.8 °C)  Heart Rate:  [100-112]  "112  Resp:  [18-23] 18  BP: (109-152)/(72-99) 136/92  Oxygen Therapy  SpO2: 90 %  Pulse Oximetry Type: Intermittent  Device (Oxygen Therapy): room air  Flowsheet Rows      First Filed Value   Admission Height  165.1 cm (65\") Documented at 12/31/2020 1712   Admission Weight  101 kg (222 lb 14.2 oz) Documented at 12/31/2020 1712        Intake & Output (last 3 days)       12/30 0701 - 12/31 0700 12/31 0701 - 01/01 0700 01/01 0701 - 01/02 0700 01/02 0701 - 01/03 0700    P.O.   1200 840    IV Piggyback  1600      Total Intake(mL/kg)  1600 (15.8) 1200 (11.8) 840 (8.2)    Urine (mL/kg/hr)   400 (0.2) 1300 (1.6)    Total Output   400 1300    Net  +1600 +800 -460            Urine Unmeasured Occurrence   1 x         Lines, Drains & Airways    Active LDAs     Name:   Placement date:   Placement time:   Site:   Days:    Peripheral IV 12/31/20 2148 Anterior;Distal;Right;Upper Arm   12/31/20 2148    Arm   less than 1                  Physical Exam:    Physical Exam  Vitals signs and nursing note reviewed.   Constitutional:       General: She is not in acute distress.     Appearance: Normal appearance. She is well-developed. She is not ill-appearing, toxic-appearing or diaphoretic.   HENT:      Head: Normocephalic and atraumatic.      Right Ear: Ear canal and external ear normal.      Left Ear: Ear canal and external ear normal.      Nose: Nose normal. No congestion or rhinorrhea.      Mouth/Throat:      Mouth: Mucous membranes are moist.      Pharynx: No oropharyngeal exudate.   Eyes:      General: No scleral icterus.        Right eye: No discharge.         Left eye: No discharge.      Extraocular Movements: Extraocular movements intact.      Conjunctiva/sclera: Conjunctivae normal.      Pupils: Pupils are equal, round, and reactive to light.   Neck:      Musculoskeletal: Normal range of motion and neck supple. No neck rigidity or muscular tenderness.      Thyroid: No thyromegaly.      Vascular: No carotid bruit or JVD.      " Trachea: No tracheal deviation.   Cardiovascular:      Rate and Rhythm: Normal rate and regular rhythm.      Pulses: Normal pulses.      Heart sounds: Normal heart sounds. No murmur. No friction rub. No gallop.    Pulmonary:      Effort: Pulmonary effort is normal. No respiratory distress.      Breath sounds: Normal breath sounds. No stridor. No wheezing, rhonchi or rales.   Chest:      Chest wall: No tenderness.   Abdominal:      General: Bowel sounds are normal. There is no distension.      Palpations: Abdomen is soft. There is no mass.      Tenderness: There is no abdominal tenderness. There is no guarding or rebound.      Hernia: No hernia is present.   Musculoskeletal: Normal range of motion.         General: No swelling, tenderness, deformity or signs of injury.      Right lower leg: No edema.      Left lower leg: No edema.   Lymphadenopathy:      Cervical: No cervical adenopathy.   Skin:     General: Skin is warm and dry.      Coloration: Skin is not jaundiced or pale.      Findings: No bruising, erythema or rash.   Neurological:      General: No focal deficit present.      Mental Status: She is alert and oriented to person, place, and time. Mental status is at baseline.      Cranial Nerves: No cranial nerve deficit.      Sensory: No sensory deficit.      Motor: No weakness or abnormal muscle tone.      Coordination: Coordination normal.   Psychiatric:         Mood and Affect: Mood normal.         Behavior: Behavior normal.         Thought Content: Thought content normal.         Judgment: Judgment normal.     There is swelling to the left upper extremity and hand; there is no swelling noted in the lower extremities.  canot feel pulses   purpuric rash bilateral lower extremities       Wounds (last 24 hours)      LDA Wound     Row Name 01/02/21 0740 01/01/21 1958 01/01/21 1600       Wound 12/24/20 2219 Left anterior shoulder Incision    Wound - Properties Group Placement Date: 12/24/20  -PHILLIP Placement Time:  2219  -PHILLIP Present on Hospital Admission: Y  -PHILLIP Side: Left  -PHILLIP Orientation: anterior  -PHILLIP Location: shoulder  -PHILLIP Primary Wound Type: Incision  -PHILLIP    Dressing Appearance  dry;intact  -BD  dry;intact  -AM  intact  -CS    Closure  NATHANAEL  -BD  NATHANAEL  -AM  --    Base  dressing in place, unable to visualize  -BD  dressing in place, unable to visualize  -AM  --    Periwound  intact  -BD  --  --    Periwound Temperature  warm  -BD  --  --    Drainage Amount  none  -BD  none  -AM  --    Retired Wound - Properties Group Date first assessed: 12/24/20  -PHILLIP Time first assessed: 2219  -PHILLIP Present on Hospital Admission: Y  -PHILLIP Side: Left  -PHILLIP Location: shoulder  -PHILLIP Primary Wound Type: Incision  -PHILLIP       Rash 12/31/20 0750 medial abdomen other (see comments)    Rash - Properties Group Placement Date: 12/31/20  -CS Placement Time: 0750  -CS Orientation: medial  -CS Location: abdomen  -CS Type: other (see comments)  -CS Additional Comments: red pinpoint  -CS    Retired Rash - Properties Group Date first assessed: 12/31/20  -CS Time first assessed: 0750  -CS Orientation: medial  -CS Location: abdomen  -CS Type: other (see comments)  -CS Additional Comments: red pinpoint  -CS       Rash 01/01/21 0751 Left calf patch    Rash - Properties Group Placement Date: 01/01/21  -CS Placement Time: 0751  -CS Side: Left  -CS Location: calf  -CS Type: patch  -CS    Retired Rash - Properties Group Date first assessed: 01/01/21  -CS Time first assessed: 0751  -CS Side: Left  -CS Location: calf  -CS Type: patch  -CS       Rash 01/01/21 0751 Right calf patch    Rash - Properties Group Placement Date: 01/01/21  -CS Placement Time: 0751  -CS Side: Right  -CS Location: calf  -CS Type: patch  -CS    Retired Rash - Properties Group Date first assessed: 01/01/21  -CS Time first assessed: 0751  -CS Side: Right  -CS Location: calf  -CS Type: patch  -CS      User Key  (r) = Recorded By, (t) = Taken By, (c) = Cosigned By    Initials Name Provider Type    PHILLIP  Lucina Darby, RN Registered Nurse    Leatha De Leon RN Registered Nurse    Monique Cole RN Registered Nurse    Brenda Valdez LPN Licensed Nurse          Procedures:              Results Review:     I reviewed the patient's new clinical results.      Lab Results (last 24 hours)     Procedure Component Value Units Date/Time    Urine Culture - Urine, Urine, Clean Catch [[id number]]  (Normal) Collected: 01/01/21 1611    Specimen: Urine, Clean Catch Updated: 01/02/21 1241     Urine Culture No growth    Comprehensive Metabolic Panel [[id number]]  (Abnormal) Collected: 01/02/21 0306    Specimen: Blood Updated: 01/02/21 0345     Glucose 95 mg/dL      BUN 14 mg/dL      Creatinine 1.43 mg/dL      Sodium 134 mmol/L      Potassium 4.0 mmol/L      Chloride 101 mmol/L      CO2 24.0 mmol/L      Calcium 8.0 mg/dL      Total Protein 5.6 g/dL      Albumin 2.50 g/dL      ALT (SGPT) 18 U/L      AST (SGOT) 25 U/L      Alkaline Phosphatase 117 U/L      Total Bilirubin 0.4 mg/dL      eGFR Non African Amer 40 mL/min/1.73      Globulin 3.1 gm/dL      A/G Ratio 0.8 g/dL      BUN/Creatinine Ratio 9.8     Anion Gap 9.0 mmol/L     Narrative:      GFR Normal >60  Chronic Kidney Disease <60  Kidney Failure <15      C-reactive Protein [[id number]]  (Abnormal) Collected: 01/02/21 0306    Specimen: Blood Updated: 01/02/21 0345     C-Reactive Protein 5.85 mg/dL     CK [[id number]]  (Normal) Collected: 01/02/21 0306    Specimen: Blood Updated: 01/02/21 0345     Creatine Kinase 94 U/L     CBC & Differential [[id number]]  (Abnormal) Collected: 01/02/21 0306    Specimen: Blood Updated: 01/02/21 0321    Narrative:      The following orders were created for panel order CBC & Differential.  Procedure                               Abnormality         Status                     ---------                               -----------         ------                     CBC Auto Differential[[id number]]        Abnormal            Final result                  Please view results for these tests on the individual orders.    CBC Auto Differential [[id number]]  (Abnormal) Collected: 01/02/21 0306    Specimen: Blood Updated: 01/02/21 0321     WBC 7.30 10*3/mm3      RBC 3.38 10*6/mm3      Hemoglobin 10.6 g/dL      Hematocrit 32.1 %      MCV 95.0 fL      MCH 31.4 pg      MCHC 33.1 g/dL      RDW 17.3 %      RDW-SD 57.8 fl      MPV 7.9 fL      Platelets 267 10*3/mm3      Neutrophil % 62.6 %      Lymphocyte % 23.4 %      Monocyte % 10.4 %      Eosinophil % 2.7 %      Basophil % 0.9 %      Neutrophils, Absolute 4.60 10*3/mm3      Lymphocytes, Absolute 1.70 10*3/mm3      Monocytes, Absolute 0.80 10*3/mm3      Eosinophils, Absolute 0.20 10*3/mm3      Basophils, Absolute 0.10 10*3/mm3      nRBC 0.0 /100 WBC     Blood Culture - Blood, Arm, Right [[id number]] Collected: 01/01/21 0021    Specimen: Blood from Arm, Right Updated: 01/02/21 0030     Blood Culture No growth at 24 hours    Blood Culture - Blood, Arm, Right [[id number]] Collected: 12/31/20 2339    Specimen: Blood from Arm, Right Updated: 01/01/21 2345     Blood Culture No growth at 24 hours    Urinalysis, Microscopic Only - Urine, Clean Catch [[id number]]  (Abnormal) Collected: 01/01/21 1611    Specimen: Urine, Clean Catch Updated: 01/01/21 1652     RBC, UA 21-30 /HPF      WBC, UA 6-12 /HPF      Bacteria, UA 1+ /HPF      Squamous Epithelial Cells, UA 0-2 /HPF      Hyaline Casts, UA 0-2 /LPF      Granular Casts, UA 0-2 /LPF      Methodology Manual Light Microscopy    Urinalysis With Culture If Indicated - Urine, Clean Catch [[id number]]  (Abnormal) Collected: 01/01/21 1611    Specimen: Urine, Clean Catch Updated: 01/01/21 1626     Color, UA Yellow     Appearance, UA Cloudy     Comment: Result checked         pH, UA <=5.0     Specific Gravity, UA 1.016     Glucose, UA Negative     Ketones, UA Negative     Bilirubin, UA Negative     Blood, UA Large (3+)     Protein, UA 30 mg/dL (1+)     Leuk Esterase, UA Small (1+)      Nitrite, UA Negative     Urobilinogen, UA 0.2 E.U./dL        No results found for: HGBA1C            No results found for: LIPASE  No results found for: CHOL, CHLPL, TRIG, HDL, LDL, LDLDIRECT    No results found for: INTRAOP, PREDX, FINALDX, COMDX    Microbiology Results (last 10 days)     Procedure Component Value - Date/Time    Urine Culture - Urine, Urine, Clean Catch [[id number]]  (Normal) Collected: 01/01/21 1611    Lab Status: Preliminary result Specimen: Urine, Clean Catch Updated: 01/02/21 1241     Urine Culture No growth    Respiratory Panel PCR w/COVID-19(SARS-CoV-2) CARRIE/BARRETT/KANDY/PAD/COR/MAD/MAXIMILIANO In-House, NP Swab in UTM/VTM, 3-4 HR TAT - Swab, Nasopharynx [[id number]]  (Normal) Collected: 01/01/21 0143    Lab Status: Final result Specimen: Swab from Nasopharynx Updated: 01/01/21 0244     ADENOVIRUS, PCR Not Detected     Coronavirus 229E Not Detected     Coronavirus HKU1 Not Detected     Coronavirus NL63 Not Detected     Coronavirus OC43 Not Detected     COVID19 Not Detected     Human Metapneumovirus Not Detected     Human Rhinovirus/Enterovirus Not Detected     Influenza A PCR Not Detected     Influenza B PCR Not Detected     Parainfluenza Virus 1 Not Detected     Parainfluenza Virus 2 Not Detected     Parainfluenza Virus 3 Not Detected     Parainfluenza Virus 4 Not Detected     RSV, PCR Not Detected     Bordetella pertussis pcr Not Detected     Bordetella parapertussis PCR Not Detected     Chlamydophila pneumoniae PCR Not Detected     Mycoplasma pneumo by PCR Not Detected    Narrative:      Fact sheet for providers: https://docs.[employer]/wp-content/uploads/HYO3461-0413-ZV2.1-EUA-Provider-Fact-Sheet-3.pdf    Fact sheet for patients: https://docs.[employer]/wp-content/uploads/NGX8476-0715-RL2.1-EUA-Patient-Fact-Sheet-1.pdf    Test performed by PCR.    Blood Culture - Blood, Arm, Right [[id number]] Collected: 01/01/21 0021    Lab Status: Preliminary result Specimen: Blood from Arm, Right Updated:  01/02/21 0030     Blood Culture No growth at 24 hours    Blood Culture - Blood, Arm, Right [[id number]] Collected: 12/31/20 2339    Lab Status: Preliminary result Specimen: Blood from Arm, Right Updated: 01/01/21 2345     Blood Culture No growth at 24 hours    Tissue / Bone Culture - Tissue, Shoulder, Left [[id number]]  (Abnormal)  (Susceptibility) Collected: 12/24/20 2156    Lab Status: Final result Specimen: Tissue from Shoulder, Left Updated: 12/27/20 0852     Tissue Culture Light growth (2+) Staphylococcus aureus     Gram Stain Many (4+) Red blood cells      Few (2+) WBCs per low power field      Few (2+) Gram positive cocci, intracellular and extracellular    Susceptibility      Staphylococcus aureus     OLIVIA     Clindamycin Susceptible     Erythromycin Resistant     Inducible Clindamycin Resistance Negative     Oxacillin Susceptible     Penicillin G Resistant     Rifampin Susceptible     Tetracycline Susceptible     Trimethoprim + Sulfamethoxazole Susceptible     Vancomycin Susceptible                Susceptibility Comments     Staphylococcus aureus    This isolate does not demonstrate inducible clindamycin resistance in vitro.               Body Fluid Culture - Synovial Fluid, Shoulder, Left [[id number]] Collected: 12/24/20 2133    Lab Status: Final result Specimen: Synovial Fluid from Shoulder, Left Updated: 12/30/20 0624     Body Fluid Culture No growth at 5 days     Gram Stain No organisms seen    Anaerobic Culture - Synovial Fluid, Shoulder, Left [[id number]] Collected: 12/24/20 2123    Lab Status: Final result Specimen: Synovial Fluid from Shoulder, Left Updated: 12/30/20 0609     Anaerobic Culture No anaerobes isolated at 5 days    Body Fluid Culture - Aspirate, Shoulder, Left [[id number]] Collected: 12/24/20 0911    Lab Status: Final result Specimen: Aspirate from Shoulder, Left Updated: 12/29/20 0612     Body Fluid Culture No growth at 5 days     Gram Stain Many (4+) WBCs per low power field      No  organisms seen    Blood Culture - Blood, Arm, Left [[id number]] Collected: 12/23/20 1631    Lab Status: Final result Specimen: Blood from Arm, Left Updated: 12/28/20 1645     Blood Culture No growth at 5 days          ECG/EMG Results (most recent)     Procedure Component Value Units Date/Time    ECG 12 Lead [[id number]] Collected: 12/31/20 2014     Updated: 01/02/21 0823     QT Interval 369 ms     Narrative:      HEART RATE= 106  bpm  RR Interval= 568  ms  IA Interval= 111  ms  P Horizontal Axis= -26  deg  P Front Axis= 53  deg  QRSD Interval= 73  ms  QT Interval= 369  ms  QRS Axis= 73  deg  T Wave Axis= 8  deg  - ABNORMAL ECG -  Sinus tachycardia  Nonspecific T abnormalities, diffuse leads  When compared with ECG of 22-Dec-2020 19:47:41,  No significant change  Electronically Signed By: Merirtt Hodgson ([facility]) 02-Jan-2021 08:22:08  Date and Time of Study: 2020-12-31 20:14:42          Results for orders placed during the hospital encounter of 12/31/20   Duplex Venous Upper Extremity - Left CAR    Narrative · Acute left upper extremity superficial thrombophlebitis noted in the   basilic (upper arm), basilic (forearm), cephalic (upper arm), cephalic   (forearm) and median cubital.  · All other left sided vessels appear normal.          Results for orders placed during the hospital encounter of 12/22/20   Adult Transthoracic Echo Complete W/ Cont if Necessary Per Protocol    Narrative · The left ventricular cavity is small in size.  · Left ventricular wall thickness is consistent with concentric   hypertrophy.  · Estimated left ventricular EF = 65% Left ventricular systolic function   is normal.  · The right ventricular cavity is mild to moderately dilated.  · The right atrial cavity is borderline dilated.  · Estimated right ventricular systolic pressure from tricuspid   regurgitation is moderately elevated (45-55 mmHg).  · Moderate pulmonary hypertension is present.          Xr Chest 1 View    Result Date: 12/31/2020   1.  Cardiomegaly. 2. Interval development of right basilar consolidation felt to represent either pneumonia or atelectasis with a small pleural effusion.  Electronically Signed By-Raul Valdes MD On:12/31/2020 6:53 PM This report was finalized on [id number] by  Raul Valdes MD.    Ct Chest Pulmonary Embolism    Result Date: 12/31/2020  1. Large consolidation in the right lower lobe. This likely represents a combination of atelectasis and consolidative right lower lobe pneumonia. There is an additional consolidation in the right middle lobe also likely representing atelectasis and consolidative pneumonia. Continuous radiographic follow-up to complete resolution is recommended. 2. There is a moderately sized right-sided pleural effusion. There is a trace left-sided pleural effusion. There is an element of early pulmonary edema. 3. Subtle reticulonodular type infiltrate in the anterior aspect of the right upper lobe likely representing mild infectious bronchopneumonia. 4. Hepatomegaly with hepatic steatosis. 5. Emphysema. 6. Body wall edema. 7. No definitive pulmonary embolus. Slot 63 Electronically signed by:  Marvin Martinez M.D.  12/31/2020 8:59 PM          Xrays, labs reviewed personally by physician.    Medication Review:   I have reviewed the patient's current medication list      Scheduled Meds  aspirin, 325 mg, Oral, Daily  DAPTOmycin, 6 mg/kg (Adjusted), Intravenous, Q24H  furosemide, 20 mg, Intravenous, BID  Linezolid, 600 mg, Intravenous, Q12H  sodium chloride, 10 mL, Intravenous, Q12H        Meds Infusions       Meds PRN  •  acetaminophen **OR** acetaminophen **OR** acetaminophen  •  diphenhydrAMINE  •  diphenhydrAMINE-zinc acetate  •  ondansetron **OR** ondansetron  •  oxyCODONE  •  sodium chloride        Assessment/Plan   Assessment/Plan     Active Hospital Problems    Diagnosis  POA   • Congestive heart failure (CMS/HCC) [I50.9]  Yes      Resolved Hospital Problems   No resolved problems to display.        MEDICAL DECISION MAKING COMPLEXITY BY PROBLEM:         Pneumonia, right lower and middle lobe--recent hospitalization x1 week  Respiratory panel including Covid 1/1/2021 -  Need to rule out infective endocarditis/septic emboli  Patient had 2D echo last admission  Repeat 2D echo ordered  Consider MYLES  We will ask ID for evaluation  Initial antibiotics with cefepime and Vanco.  Now on Zyvox per ID      Methicillin susceptible Staphylococcus aureus bacteremia and IV drug user.    Most likely secondary to left shoulder infection and left Acromioclavicular joint infection  2D echo showed no signs of vegetation.  Cardio service are comfortable reading the 2D echo alone since it was good imaging study.  -Initial plan Rocephin 2 g IV daily for 6 weeks  -Now switched to daptomycin  -On Zyvox given pneumonia as well per ID.    Left upper extremity edema-  Doppler without DVT.  Superficial thrombophlebitis noted.  Improved.  Could be dependent edema related to recent shoulder surgery  Has good pulses.    Acute kidney injury  Could be cardiorenal  Nephrologist following  Could be related to vasculitis as well       Septic left shoulder joint infection on previous admission:  MSSA of the left shoulder with osteomyelitis, stable on outpatient antibiotics:  Antibiotics as above    Suspect right-sided heart failure  Elevated BNP with recent echocardiogram December 2020, showing EF 65% with pulmonary hypertension and moderate tricuspid regurgitation:   Agree with diuresis as tolerated    Rash-  Could be vasculitic  Etiology not clear  Appreciate ID input    Normocytic anemia likely of chronic disease  We will monitor  Anemia panel ordered      High risk for limb loss  dw patient  VTE Prophylaxis -   Mechanical Order History:     None      Pharmalogical Order History:      Ordered     Dose Route Frequency Stop    12/31/20 3306  enoxaparin (LOVENOX) syringe 40 mg  Status:  Discontinued      40 mg SC Daily 01/01/21 1996     01/01/21 0215  enoxaparin (LOVENOX) syringe 100 mg      1 mg/kg SC Once 01/01/21 0316                  Code Status -   Code Status and Medical Interventions:   Ordered at: 12/31/20 8018     Code Status:    CPR     Medical Interventions (Level of Support Prior to Arrest):    Full       This patient has been examined wearing appropriate Personal Protective Equipment and discussed with hospital infection control department. 01/02/21        Discharge Planning  home        Electronically signed by Julian Godoy MD, 01/02/21, 15:14 EST.  [facility]ist Team

## 2021-01-02 NOTE — PLAN OF CARE
Goal Outcome Evaluation:  Plan of Care Reviewed With: patient  Progress: no change  Outcome Summary: pt doing okay. resting well. complaints of pain in L shoulder into neck, treated per MAR. VSS. tolerating antibiotics. will continue to monitor.

## 2021-01-02 NOTE — PROGRESS NOTES
Infectious Diseases Progress Note      LOS: 0 days   Patient Care Team:  Danie Dee MD as PCP - General    Chief Complaint: Fatigue and skin rash    Subjective     The patient had no fever during the last 24 hours.  The patient remained hemodynamically stable.  The patient denied having any new complaints today.    Review of Systems:   Review of Systems   Constitutional: Negative.    HENT: Negative.    Eyes: Negative.    Respiratory: Negative.    Cardiovascular: Positive for leg swelling.   Gastrointestinal: Negative.    Genitourinary: Negative.    Musculoskeletal: Negative.    Skin: Positive for rash.   Neurological: Negative.    Hematological: Negative.    Psychiatric/Behavioral: Negative.         Objective     Vital Signs  Temp:  [97.8 °F (36.6 °C)-98.3 °F (36.8 °C)] 98.3 °F (36.8 °C)  Heart Rate:  [100-112] 112  Resp:  [18-23] 18  BP: (109-152)/(72-99) 136/92    Physical Exam:  Physical Exam  Vitals signs and nursing note reviewed.   Constitutional:       Appearance: She is well-developed.   HENT:      Head: Normocephalic and atraumatic.   Eyes:      Pupils: Pupils are equal, round, and reactive to light.   Neck:      Musculoskeletal: Normal range of motion and neck supple.   Cardiovascular:      Rate and Rhythm: Normal rate and regular rhythm.      Heart sounds: Normal heart sounds.   Pulmonary:      Effort: Pulmonary effort is normal. No respiratory distress.      Breath sounds: Normal breath sounds. No wheezing or rales.   Abdominal:      General: Bowel sounds are normal. There is no distension.      Palpations: Abdomen is soft. There is no mass.      Tenderness: There is no abdominal tenderness. There is no guarding or rebound.   Musculoskeletal: Normal range of motion.         General: No deformity.   Skin:     General: Skin is warm.      Findings: Rash present. No erythema.      Comments: Petechial rash mostly involving the posterior calfs   Neurological:      Mental Status: She is alert and oriented  to person, place, and time.      Cranial Nerves: No cranial nerve deficit.          Results Review:    I have reviewed all clinical data, test, lab, and imaging results.     Radiology  No Radiology Exams Resulted Within Past 24 Hours    Cardiology    Laboratory    Results from last 7 days   Lab Units 01/02/21 0306 01/01/21 0212 12/31/20 2142 12/29/20 0437 12/28/20  0510 12/27/20  0336   WBC 10*3/mm3 7.30 7.10 8.00 9.80 11.00* 8.80   HEMOGLOBIN g/dL 10.6* 10.2* 11.2* 10.7* 10.5* 11.3*   HEMATOCRIT % 32.1* 31.0* 34.2 32.4* 32.2* 34.5   PLATELETS 10*3/mm3 267 299 337 267 237 224     Results from last 7 days   Lab Units 01/02/21 0306 01/01/21 0212 12/31/20 2142 12/29/20 0437 12/28/20  0510 12/27/20  0336   SODIUM mmol/L 134* 135* 135* 132* 129* 133*   POTASSIUM mmol/L 4.0 4.1 4.0 3.6 3.9 3.0*   CHLORIDE mmol/L 101 101 101 100 100 101   CO2 mmol/L 24.0 25.0 25.0 23.0 21.0* 24.0   BUN mg/dL 14 15 16 17 17 13   CREATININE mg/dL 1.43* 1.42* 1.40* 0.79 0.74 0.70   GLUCOSE mg/dL 95 102* 104* 136* 124* 141*   ALBUMIN g/dL 2.50*  --  3.00* 2.20* 2.00* 2.30*   BILIRUBIN mg/dL 0.4  --  0.5 0.4 0.4 0.4   ALK PHOS U/L 117  --  136* 163* 184* 166*   AST (SGOT) U/L 25  --  25 27 49* 58*   ALT (SGPT) U/L 18  --  26 37* 51* 64*   CALCIUM mg/dL 8.0* 8.1* 8.5* 8.0* 8.2* 7.7*     Results from last 7 days   Lab Units 01/02/21 0306   CK TOTAL U/L 94     Results from last 7 days   Lab Units 12/28/20  0510   SED RATE mm/hr 80*         Microbiology   Microbiology Results (last 10 days)     Procedure Component Value - Date/Time    Urine Culture - Urine, Urine, Clean Catch [[id number]]  (Normal) Collected: 01/01/21 1611    Lab Status: Final result Specimen: Urine, Clean Catch Updated: 01/02/21 1537     Urine Culture No growth    Respiratory Panel PCR w/COVID-19(SARS-CoV-2) [facility]E/BARRETT/KANDY/[facility]IM[facility] In-House, NP Swab in UTM/VTM, 3-4 HR TAT - Swab, Nasopharynx [[id number]]  (Normal) Collected: 01/01/21 0143    Lab Status: Final result  Specimen: Swab from Nasopharynx Updated: 01/01/21 0244     ADENOVIRUS, PCR Not Detected     Coronavirus 229E Not Detected     Coronavirus HKU1 Not Detected     Coronavirus NL63 Not Detected     Coronavirus OC43 Not Detected     COVID19 Not Detected     Human Metapneumovirus Not Detected     Human Rhinovirus/Enterovirus Not Detected     Influenza A PCR Not Detected     Influenza B PCR Not Detected     Parainfluenza Virus 1 Not Detected     Parainfluenza Virus 2 Not Detected     Parainfluenza Virus 3 Not Detected     Parainfluenza Virus 4 Not Detected     RSV, PCR Not Detected     Bordetella pertussis pcr Not Detected     Bordetella parapertussis PCR Not Detected     Chlamydophila pneumoniae PCR Not Detected     Mycoplasma pneumo by PCR Not Detected    Narrative:      Fact sheet for providers: https://docs.[employer]/wp-content/uploads/LRF1242-8003-QM6.1-EUA-Provider-Fact-Sheet-3.pdf    Fact sheet for patients: https://docs.[employer]/wp-content/uploads/ARI1426-3744-PZ1.1-EUA-Patient-Fact-Sheet-1.pdf    Test performed by PCR.    Blood Culture - Blood, Arm, Right [[id number]] Collected: 01/01/21 0021    Lab Status: Preliminary result Specimen: Blood from Arm, Right Updated: 01/02/21 0030     Blood Culture No growth at 24 hours    Blood Culture - Blood, Arm, Right [[id number]] Collected: 12/31/20 2339    Lab Status: Preliminary result Specimen: Blood from Arm, Right Updated: 01/01/21 2345     Blood Culture No growth at 24 hours    Tissue / Bone Culture - Tissue, Shoulder, Left [[id number]]  (Abnormal)  (Susceptibility) Collected: 12/24/20 2156    Lab Status: Final result Specimen: Tissue from Shoulder, Left Updated: 12/27/20 0852     Tissue Culture Light growth (2+) Staphylococcus aureus     Gram Stain Many (4+) Red blood cells      Few (2+) WBCs per low power field      Few (2+) Gram positive cocci, intracellular and extracellular    Susceptibility      Staphylococcus aureus     OLIVIA     Clindamycin Susceptible      Erythromycin Resistant     Inducible Clindamycin Resistance Negative     Oxacillin Susceptible     Penicillin G Resistant     Rifampin Susceptible     Tetracycline Susceptible     Trimethoprim + Sulfamethoxazole Susceptible     Vancomycin Susceptible                Susceptibility Comments     Staphylococcus aureus    This isolate does not demonstrate inducible clindamycin resistance in vitro.               Body Fluid Culture - Synovial Fluid, Shoulder, Left [[id number]] Collected: 12/24/20 2133    Lab Status: Final result Specimen: Synovial Fluid from Shoulder, Left Updated: 12/30/20 0624     Body Fluid Culture No growth at 5 days     Gram Stain No organisms seen    Anaerobic Culture - Synovial Fluid, Shoulder, Left [[id number]] Collected: 12/24/20 2123    Lab Status: Final result Specimen: Synovial Fluid from Shoulder, Left Updated: 12/30/20 0609     Anaerobic Culture No anaerobes isolated at 5 days    Body Fluid Culture - Aspirate, Shoulder, Left [[id number]] Collected: 12/24/20 0911    Lab Status: Final result Specimen: Aspirate from Shoulder, Left Updated: 12/29/20 0612     Body Fluid Culture No growth at 5 days     Gram Stain Many (4+) WBCs per low power field      No organisms seen    Blood Culture - Blood, Arm, Left [[id number]] Collected: 12/23/20 1631    Lab Status: Final result Specimen: Blood from Arm, Left Updated: 12/28/20 1645     Blood Culture No growth at 5 days          Medication Review:       Schedule Meds  aspirin, 325 mg, Oral, Daily  DAPTOmycin, 6 mg/kg (Adjusted), Intravenous, Q24H  furosemide, 20 mg, Intravenous, BID  Linezolid, 600 mg, Intravenous, Q12H  sodium chloride, 10 mL, Intravenous, Q12H        Infusion Meds       PRN Meds  •  acetaminophen **OR** acetaminophen **OR** acetaminophen  •  diphenhydrAMINE  •  diphenhydrAMINE-zinc acetate  •  ondansetron **OR** ondansetron  •  oxyCODONE  •  sodium chloride        Assessment/Plan       Antimicrobial Therapy   1.  IV daptomycin      day  2.   Zyvox      day  3.      Day  4.      Day  5.      Day      Assessment    Recent admission for methicillin susceptible Staphylococcus aureus bacteremia and IV drug user found during last admission.  Most likely secondary to left shoulder infection and left Acromioclavicular joint infection  2D echo showed no signs of vegetation from last admission.  Cardio service are comfortable reading the 2D echo alone since it was good imaging study.  -Patient was discharged on 12/29/2020 and was receiving IV Rocephin at the ambulatory care clinic     Patient came in on 12/31/2020 with complaints of extremity swelling especially in the left extremity   -Doppler of the left extremity did not show DVT but had multiple SVTs  -Surgical incisions appear to be healing well with no obvious signs of infection     Purpuric rash on bilateral lower legs that patient complains are itching and burning  -Started 12/31/2020 after patient received IV Rocephin in ambulatory care clinic  -Likely reaction to antibiotics/ceftriaxone     Left septic shoulder/Acromioclavicular joint infection with abscess in the trapezius musculature found on last admission  The patient is s/p washout of the left shoulder and Acromioclavicular joint debridement with drainage of trapezius muscle abscess on December 24, 2020.  Intraoperative cultures are growing methicillin susceptible Staphylococcus aureus     IV drug user  -Drug screen from last admission was positive for methamphetamines and opiates  -Hepatitis B and C and HIV screens were negative     Positive urine culture for methicillin susceptible Staph aureus found during last admission.  This is descending infection as a result of bacteremia     New finding this admission of right lower and middle lobe consolidation with right pleural effusion  -Patient denies shortness of breath and is currently on room air          Plan     Continue IV daptomycin 6 mg/kg every 24 hours for 33 days to finish up 6 weeks of  treatment for MSSA bacteremia and joint infection  Continue IV Zyvox 600 mg every 12 hours for now   Request a repeat 2D echo  Continue supportive care  A.m. labs including CPK  Tobacco abuse cessation  Illicit drug abuse cessation  Case was discussed with primary service         Asaf Pruitt MD  01/02/21  16:04 EST      Note is dictated utilizing voice recognition software/Dragon

## 2021-01-03 ENCOUNTER — APPOINTMENT (OUTPATIENT)
Dept: CARDIOLOGY | Facility: HOSPITAL | Age: 46
End: 2021-01-03

## 2021-01-03 LAB
ALBUMIN SERPL-MCNC: 2.5 G/DL (ref 3.5–5.2)
ALBUMIN/GLOB SERPL: 0.7 G/DL
ALP SERPL-CCNC: 123 U/L (ref 39–117)
ALT SERPL W P-5'-P-CCNC: 17 U/L (ref 1–33)
ANION GAP SERPL CALCULATED.3IONS-SCNC: 11 MMOL/L (ref 5–15)
AST SERPL-CCNC: 17 U/L (ref 1–32)
BASOPHILS # BLD AUTO: 0.1 10*3/MM3 (ref 0–0.2)
BASOPHILS NFR BLD AUTO: 1.3 % (ref 0–1.5)
BH CV ECHO MEAS - BSA(HAYCOCK): 2.2 M^2
BH CV ECHO MEAS - BSA: 2.1 M^2
BH CV ECHO MEAS - BZI_BMI: 37.4 KILOGRAMS/M^2
BH CV ECHO MEAS - BZI_METRIC_HEIGHT: 165.1 CM
BH CV ECHO MEAS - BZI_METRIC_WEIGHT: 102.1 KG
BH CV ECHO MEAS - RAP SYSTOLE: 8 MMHG
BH CV ECHO MEAS - RVSP: 61.2 MMHG
BH CV ECHO MEAS - TR MAX VEL: 364.6 CM/SEC
BILIRUB SERPL-MCNC: 0.5 MG/DL (ref 0–1.2)
BUN SERPL-MCNC: 12 MG/DL (ref 6–20)
BUN/CREAT SERPL: 8.5 (ref 7–25)
CALCIUM SPEC-SCNC: 8.4 MG/DL (ref 8.6–10.5)
CHLORIDE SERPL-SCNC: 98 MMOL/L (ref 98–107)
CK SERPL-CCNC: 76 U/L (ref 20–180)
CO2 SERPL-SCNC: 24 MMOL/L (ref 22–29)
CREAT SERPL-MCNC: 1.41 MG/DL (ref 0.57–1)
CRP SERPL-MCNC: 4.84 MG/DL (ref 0–0.5)
DEPRECATED RDW RBC AUTO: 57.3 FL (ref 37–54)
EOSINOPHIL # BLD AUTO: 0.2 10*3/MM3 (ref 0–0.4)
EOSINOPHIL NFR BLD AUTO: 2.5 % (ref 0.3–6.2)
ERYTHROCYTE [DISTWIDTH] IN BLOOD BY AUTOMATED COUNT: 17.1 % (ref 12.3–15.4)
GFR SERPL CREATININE-BSD FRML MDRD: 41 ML/MIN/1.73
GLOBULIN UR ELPH-MCNC: 3.4 GM/DL
GLUCOSE SERPL-MCNC: 119 MG/DL (ref 65–99)
HCT VFR BLD AUTO: 32.5 % (ref 34–46.6)
HGB BLD-MCNC: 10.5 G/DL (ref 12–15.9)
LV EF 2D ECHO EST: 60 %
LYMPHOCYTES # BLD AUTO: 1.2 10*3/MM3 (ref 0.7–3.1)
LYMPHOCYTES NFR BLD AUTO: 18 % (ref 19.6–45.3)
MAGNESIUM SERPL-MCNC: 1.7 MG/DL (ref 1.6–2.6)
MCH RBC QN AUTO: 30.7 PG (ref 26.6–33)
MCHC RBC AUTO-ENTMCNC: 32.4 G/DL (ref 31.5–35.7)
MCV RBC AUTO: 94.6 FL (ref 79–97)
MONOCYTES # BLD AUTO: 0.6 10*3/MM3 (ref 0.1–0.9)
MONOCYTES NFR BLD AUTO: 9 % (ref 5–12)
NEUTROPHILS NFR BLD AUTO: 4.8 10*3/MM3 (ref 1.7–7)
NEUTROPHILS NFR BLD AUTO: 69.2 % (ref 42.7–76)
NRBC BLD AUTO-RTO: 0.1 /100 WBC (ref 0–0.2)
PHOSPHATE SERPL-MCNC: 4.7 MG/DL (ref 2.5–4.5)
PLATELET # BLD AUTO: 295 10*3/MM3 (ref 140–450)
PMV BLD AUTO: 7.9 FL (ref 6–12)
POTASSIUM SERPL-SCNC: 3.9 MMOL/L (ref 3.5–5.2)
PROT SERPL-MCNC: 5.9 G/DL (ref 6–8.5)
RBC # BLD AUTO: 3.43 10*6/MM3 (ref 3.77–5.28)
SODIUM SERPL-SCNC: 133 MMOL/L (ref 136–145)
WBC # BLD AUTO: 6.9 10*3/MM3 (ref 3.4–10.8)

## 2021-01-03 PROCEDURE — 99232 SBSQ HOSP IP/OBS MODERATE 35: CPT | Performed by: INTERNAL MEDICINE

## 2021-01-03 PROCEDURE — 93308 TTE F-UP OR LMTD: CPT

## 2021-01-03 PROCEDURE — 93321 DOPPLER ECHO F-UP/LMTD STD: CPT | Performed by: INTERNAL MEDICINE

## 2021-01-03 PROCEDURE — 85025 COMPLETE CBC W/AUTO DIFF WBC: CPT | Performed by: INTERNAL MEDICINE

## 2021-01-03 PROCEDURE — 25010000002 DAPTOMYCIN PER 1 MG: Performed by: NURSE PRACTITIONER

## 2021-01-03 PROCEDURE — 86140 C-REACTIVE PROTEIN: CPT | Performed by: INTERNAL MEDICINE

## 2021-01-03 PROCEDURE — 25010000002 MAGNESIUM SULFATE IN D5W 1G/100ML (PREMIX) 1-5 GM/100ML-% SOLUTION: Performed by: INTERNAL MEDICINE

## 2021-01-03 PROCEDURE — 93325 DOPPLER ECHO COLOR FLOW MAPG: CPT | Performed by: INTERNAL MEDICINE

## 2021-01-03 PROCEDURE — 25010000002 FUROSEMIDE PER 20 MG: Performed by: INTERNAL MEDICINE

## 2021-01-03 PROCEDURE — 84100 ASSAY OF PHOSPHORUS: CPT | Performed by: INTERNAL MEDICINE

## 2021-01-03 PROCEDURE — 80053 COMPREHEN METABOLIC PANEL: CPT | Performed by: INTERNAL MEDICINE

## 2021-01-03 PROCEDURE — 25010000002 ONDANSETRON PER 1 MG: Performed by: NURSE PRACTITIONER

## 2021-01-03 PROCEDURE — G0378 HOSPITAL OBSERVATION PER HR: HCPCS

## 2021-01-03 PROCEDURE — 83735 ASSAY OF MAGNESIUM: CPT | Performed by: INTERNAL MEDICINE

## 2021-01-03 PROCEDURE — 93325 DOPPLER ECHO COLOR FLOW MAPG: CPT

## 2021-01-03 PROCEDURE — 93308 TTE F-UP OR LMTD: CPT | Performed by: INTERNAL MEDICINE

## 2021-01-03 PROCEDURE — 82550 ASSAY OF CK (CPK): CPT | Performed by: INTERNAL MEDICINE

## 2021-01-03 PROCEDURE — 93321 DOPPLER ECHO F-UP/LMTD STD: CPT

## 2021-01-03 PROCEDURE — 25010000002 LINEZOLID 600 MG/300ML SOLUTION: Performed by: NURSE PRACTITIONER

## 2021-01-03 RX ORDER — MAGNESIUM SULFATE 1 G/100ML
1 INJECTION INTRAVENOUS ONCE
Status: COMPLETED | OUTPATIENT
Start: 2021-01-03 | End: 2021-01-03

## 2021-01-03 RX ADMIN — LINEZOLID 600 MG: 600 INJECTION, SOLUTION INTRAVENOUS at 05:55

## 2021-01-03 RX ADMIN — FUROSEMIDE 20 MG: 10 INJECTION, SOLUTION INTRAMUSCULAR; INTRAVENOUS at 09:09

## 2021-01-03 RX ADMIN — OXYCODONE 5 MG: 5 TABLET ORAL at 21:11

## 2021-01-03 RX ADMIN — DAPTOMYCIN 450 MG: 500 INJECTION, POWDER, LYOPHILIZED, FOR SOLUTION INTRAVENOUS at 17:09

## 2021-01-03 RX ADMIN — OXYCODONE 5 MG: 5 TABLET ORAL at 14:27

## 2021-01-03 RX ADMIN — MAGNESIUM SULFATE HEPTAHYDRATE 1 G: 1 INJECTION, SOLUTION INTRAVENOUS at 10:26

## 2021-01-03 RX ADMIN — FUROSEMIDE 20 MG: 10 INJECTION, SOLUTION INTRAMUSCULAR; INTRAVENOUS at 17:09

## 2021-01-03 RX ADMIN — OXYCODONE 5 MG: 5 TABLET ORAL at 10:26

## 2021-01-03 RX ADMIN — ONDANSETRON 4 MG: 2 INJECTION, SOLUTION INTRAMUSCULAR; INTRAVENOUS at 00:34

## 2021-01-03 RX ADMIN — Medication 10 ML: at 09:17

## 2021-01-03 RX ADMIN — OXYCODONE 5 MG: 5 TABLET ORAL at 06:05

## 2021-01-03 RX ADMIN — LINEZOLID 600 MG: 600 INJECTION, SOLUTION INTRAVENOUS at 18:07

## 2021-01-03 RX ADMIN — Medication 10 ML: at 21:11

## 2021-01-03 RX ADMIN — OXYCODONE 5 MG: 5 TABLET ORAL at 00:34

## 2021-01-03 NOTE — PROGRESS NOTES
[facility] Medicine Services Daily Progress Note      Hospitalist Team  LOS 0 days      Patient Care Team:  Danie Dee MD as PCP - General    Patient Location: 4125/1      Subjective   Subjective     Chief Complaint / Subjective  Chief Complaint   Patient presents with   • Leg Swelling         Brief Synopsis of Hospital Course/HPI    45-year-old female presents to the ER with chief complaint of swelling to her left upper extremity and bilateral lower extremities as well as rash to her bilateral lower extremities which started today.  The patient has had cough without sputum production.  She denies subjective fever or chills.  The patient adamantly denies IV drug use although it is documented in review of records.  The patient states she does not know how that ended up in her records.  She reiterates that she has not ever, not even once, used IV drugs.       Recent hospital admission 12/22/2020 through 12/29/2020 for severe sepsis, UTI, hyponatremia.  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  Drain was left in and removed on 12/29/2020.  MSSA culture with recommendation for Rocephin x6 weeks.  Patient had MYLES 12/22/2020 showing LV function 65%, moderate TR, moderate pulmonary hypertension.  No vegetations seen.  Status post MRI of shoulder 12/24/2020 showing osteomyelitis.  Edema versus abscess.            Date::    1/1/21  Hurting and crying fromlt ue pain  Pending v doppler ule  Resumed diuretics  Add pain meds  reconsult id   Consult rnal foraki  Check ua  chek pulse ule  .  .  1/2/21  Swelling left upper extremity improved  Venous Doppler left upper extremity showed superficial thrombophlebitis without DVT  Rash discussed with Dr. Gibbons   Will repeat 2D echo and may need MYLES    1/3/2021  She said she doesn't like the bed and wants to go home  Denies new events or symptoms  CRP slowly trending down  Has been afebrile but had mild tachycardia      ROS  All  "other systems reviewed and negative    Objective   Objective      Vital Signs  Temp:  [98.1 °F (36.7 °C)-98.2 °F (36.8 °C)] 98.2 °F (36.8 °C)  Heart Rate:  [106-125] 125  Resp:  [13-15] 14  BP: (112-134)/(74-90) 112/74  Oxygen Therapy  SpO2: 97 %  Pulse Oximetry Type: Intermittent  Device (Oxygen Therapy): room air  Flow (L/min): 2  Flowsheet Rows      First Filed Value   Admission Height  165.1 cm (65\") Documented at 12/31/2020 1712   Admission Weight  101 kg (222 lb 14.2 oz) Documented at 12/31/2020 1712        Intake & Output (last 3 days)       12/31 0701 - 01/01 0700 01/01 0701 - 01/02 0700 01/02 0701 - 01/03 0700 01/03 0701 - 01/04 0700    P.O.  1200 840     IV Piggyback 1600  300     Total Intake(mL/kg) 1600 (15.8) 1200 (11.8) 1140 (11.2)     Urine (mL/kg/hr)  400 (0.2) 2300 (0.9)     Stool   0     Total Output  400 2300     Net +1600 +800 -1160             Urine Unmeasured Occurrence  1 x      Stool Unmeasured Occurrence   1 x         Lines, Drains & Airways    Active LDAs     Name:   Placement date:   Placement time:   Site:   Days:    Peripheral IV 12/31/20 2148 Anterior;Distal;Right;Upper Arm   12/31/20 2148    Arm   less than 1                  Physical Exam:    Physical Exam  Vitals signs and nursing note reviewed.   Constitutional:       General: She is not in acute distress.     Appearance: Normal appearance. She is well-developed. She is not ill-appearing, toxic-appearing or diaphoretic.   HENT:      Head: Normocephalic and atraumatic.      Right Ear: Ear canal and external ear normal.      Left Ear: Ear canal and external ear normal.      Nose: Nose normal. No congestion or rhinorrhea.      Mouth/Throat:      Mouth: Mucous membranes are moist.      Pharynx: No oropharyngeal exudate.   Eyes:      General: No scleral icterus.        Right eye: No discharge.         Left eye: No discharge.      Extraocular Movements: Extraocular movements intact.      Conjunctiva/sclera: Conjunctivae normal.      " Pupils: Pupils are equal, round, and reactive to light.   Neck:      Musculoskeletal: Normal range of motion and neck supple. No neck rigidity or muscular tenderness.      Thyroid: No thyromegaly.      Vascular: No carotid bruit or JVD.      Trachea: No tracheal deviation.   Cardiovascular:      Rate and Rhythm: Normal rate and regular rhythm.      Pulses: Normal pulses.      Heart sounds: Normal heart sounds. No murmur. No friction rub. No gallop.    Pulmonary:      Effort: Pulmonary effort is normal. No respiratory distress.      Breath sounds: Normal breath sounds. No stridor. No wheezing, rhonchi or rales.   Chest:      Chest wall: No tenderness.   Abdominal:      General: Bowel sounds are normal. There is no distension.      Palpations: Abdomen is soft. There is no mass.      Tenderness: There is no abdominal tenderness. There is no guarding or rebound.      Hernia: No hernia is present.   Musculoskeletal: Normal range of motion.         General: No swelling, tenderness, deformity or signs of injury.      Right lower leg: No edema.      Left lower leg: No edema.   Lymphadenopathy:      Cervical: No cervical adenopathy.   Skin:     General: Skin is warm and dry.      Coloration: Skin is not jaundiced or pale.      Findings: No bruising, erythema or rash.   Neurological:      General: No focal deficit present.      Mental Status: She is alert and oriented to person, place, and time. Mental status is at baseline.      Cranial Nerves: No cranial nerve deficit.      Sensory: No sensory deficit.      Motor: No weakness or abnormal muscle tone.      Coordination: Coordination normal.   Psychiatric:         Mood and Affect: Mood normal.         Behavior: Behavior normal.         Thought Content: Thought content normal.         Judgment: Judgment normal.     There is swelling to the left upper extremity and hand; there is no swelling noted in the lower extremities.  canot feel pulses   purpuric rash bilateral lower  extremities       Wounds (last 24 hours)      LDA Wound     Row Name 01/03/21 0728 01/02/21 1950          Wound 12/24/20 2219 Left anterior shoulder Incision    Wound - Properties Group Placement Date: 12/24/20  -PHILLIP Placement Time: 2219  -PHILLIP Present on Hospital Admission: Y  -PHILLIP Side: Left  -PHILLIP Orientation: anterior  -PHILLIP Location: shoulder  -PHILLIP Primary Wound Type: Incision  -PHILLIP    Dressing Appearance  dry;intact  -HO  dry;intact  -LR     Closure  None  -HO  None  -LR     Base  dressing in place, unable to visualize  -HO  dressing in place, unable to visualize  -LR     Periwound  --  -HO  intact  -LR     Periwound Temperature  --  -HO  warm  -LR     Drainage Amount  none  -HO  none  -LR     Dressing Care  border dressing  -HO  --     Retired Wound - Properties Group Date first assessed: 12/24/20  -PHILILP Time first assessed: 2219  -PIHLLIP Present on Hospital Admission: Y  -PHILLIP Side: Left  -PHILLIP Location: shoulder  -PHILLIP Primary Wound Type: Incision  -PHILLIP       Rash 12/31/20 0750 medial abdomen other (see comments)    Rash - Properties Group Placement Date: 12/31/20  -CS Placement Time: 0750  -CS Orientation: medial  -CS Location: abdomen  -CS Type: other (see comments)  -CS Additional Comments: red pinpoint  -CS    Retired Rash - Properties Group Date first assessed: 12/31/20  -CS Time first assessed: 0750  -CS Orientation: medial  -CS Location: abdomen  -CS Type: other (see comments)  -CS Additional Comments: red pinpoint  -CS       Rash 01/01/21 0751 Left calf patch    Rash - Properties Group Placement Date: 01/01/21  -CS Placement Time: 0751  -CS Side: Left  -CS Location: calf  -CS Type: patch  -CS    Retired Rash - Properties Group Date first assessed: 01/01/21  -CS Time first assessed: 0751  -CS Side: Left  -CS Location: calf  -CS Type: patch  -CS       Rash 01/01/21 0751 Right calf patch    Rash - Properties Group Placement Date: 01/01/21  -CS Placement Time: 0751  -CS Side: Right  -CS Location: calf  -CS Type: patch  -CS     Retired Rash - Properties Group Date first assessed: 01/01/21  -CS Time first assessed: 0751  -CS Side: Right  -CS Location: calf  -CS Type: patch  -CS      User Key  (r) = Recorded By, (t) = Taken By, (c) = Cosigned By    Initials Name Provider Type    Lucina Figueroa RN Registered Nurse    Charlene Giron RN Registered Nurse    Leatha De Leon RN Registered Nurse    Rossana Castro, ARMAANN Licensed Nurse          Procedures:              Results Review:     I reviewed the patient's new clinical results.      Lab Results (last 24 hours)     Procedure Component Value Units Date/Time    Comprehensive Metabolic Panel [[id number]]  (Abnormal) Collected: 01/03/21 0319    Specimen: Blood Updated: 01/03/21 0429     Glucose 119 mg/dL      BUN 12 mg/dL      Creatinine 1.41 mg/dL      Sodium 133 mmol/L      Potassium 3.9 mmol/L      Chloride 98 mmol/L      CO2 24.0 mmol/L      Calcium 8.4 mg/dL      Total Protein 5.9 g/dL      Albumin 2.50 g/dL      ALT (SGPT) 17 U/L      AST (SGOT) 17 U/L      Alkaline Phosphatase 123 U/L      Total Bilirubin 0.5 mg/dL      eGFR Non African Amer 41 mL/min/1.73      Globulin 3.4 gm/dL      A/G Ratio 0.7 g/dL      BUN/Creatinine Ratio 8.5     Anion Gap 11.0 mmol/L     Narrative:      GFR Normal >60  Chronic Kidney Disease <60  Kidney Failure <15      C-reactive Protein [[id number]]  (Abnormal) Collected: 01/03/21 0319    Specimen: Blood Updated: 01/03/21 0429     C-Reactive Protein 4.84 mg/dL     Magnesium [[id number]]  (Normal) Collected: 01/03/21 0319    Specimen: Blood Updated: 01/03/21 0429     Magnesium 1.7 mg/dL     Phosphorus [[id number]]  (Abnormal) Collected: 01/03/21 0319    Specimen: Blood Updated: 01/03/21 0429     Phosphorus 4.7 mg/dL     CK [[id number]]  (Normal) Collected: 01/03/21 0319    Specimen: Blood Updated: 01/03/21 0429     Creatine Kinase 76 U/L     CBC & Differential [[id number]]  (Abnormal) Collected: 01/03/21 0319    Specimen: Blood Updated: 01/03/21 0406     Narrative:      The following orders were created for panel order CBC & Differential.  Procedure                               Abnormality         Status                     ---------                               -----------         ------                     CBC Auto Differential[[id number]]        Abnormal            Final result                 Please view results for these tests on the individual orders.    CBC Auto Differential [[id number]]  (Abnormal) Collected: 01/03/21 0319    Specimen: Blood Updated: 01/03/21 0406     WBC 6.90 10*3/mm3      RBC 3.43 10*6/mm3      Hemoglobin 10.5 g/dL      Hematocrit 32.5 %      MCV 94.6 fL      MCH 30.7 pg      MCHC 32.4 g/dL      RDW 17.1 %      RDW-SD 57.3 fl      MPV 7.9 fL      Platelets 295 10*3/mm3      Neutrophil % 69.2 %      Lymphocyte % 18.0 %      Monocyte % 9.0 %      Eosinophil % 2.5 %      Basophil % 1.3 %      Neutrophils, Absolute 4.80 10*3/mm3      Lymphocytes, Absolute 1.20 10*3/mm3      Monocytes, Absolute 0.60 10*3/mm3      Eosinophils, Absolute 0.20 10*3/mm3      Basophils, Absolute 0.10 10*3/mm3      nRBC 0.1 /100 WBC     Blood Culture - Blood, Arm, Right [[id number]] Collected: 01/01/21 0021    Specimen: Blood from Arm, Right Updated: 01/03/21 0030     Blood Culture No growth at 2 days    Blood Culture - Blood, Arm, Right [[id number]] Collected: 12/31/20 2339    Specimen: Blood from Arm, Right Updated: 01/02/21 2345     Blood Culture No growth at 2 days    Urine Culture - Urine, Urine, Clean Catch [[id number]]  (Normal) Collected: 01/01/21 1611    Specimen: Urine, Clean Catch Updated: 01/02/21 1537     Urine Culture No growth        No results found for: HGBA1C            No results found for: LIPASE  No results found for: CHOL, CHLPL, TRIG, HDL, LDL, LDLDIRECT    No results found for: INTRAOP, PREDX, FINALDX, COMDX    Microbiology Results (last 10 days)     Procedure Component Value - Date/Time    Urine Culture - Urine, Urine, Clean Catch [[id number]]   (Normal) Collected: 01/01/21 1611    Lab Status: Final result Specimen: Urine, Clean Catch Updated: 01/02/21 1537     Urine Culture No growth    Respiratory Panel PCR w/COVID-19(SARS-CoV-2) CARRIE/BARRETT/KANDY/PAD/COR/MAD/MAXIMILIANO In-House, NP Swab in UTM/VTM, 3-4 HR TAT - Swab, Nasopharynx [[id number]]  (Normal) Collected: 01/01/21 0143    Lab Status: Final result Specimen: Swab from Nasopharynx Updated: 01/01/21 0244     ADENOVIRUS, PCR Not Detected     Coronavirus 229E Not Detected     Coronavirus HKU1 Not Detected     Coronavirus NL63 Not Detected     Coronavirus OC43 Not Detected     COVID19 Not Detected     Human Metapneumovirus Not Detected     Human Rhinovirus/Enterovirus Not Detected     Influenza A PCR Not Detected     Influenza B PCR Not Detected     Parainfluenza Virus 1 Not Detected     Parainfluenza Virus 2 Not Detected     Parainfluenza Virus 3 Not Detected     Parainfluenza Virus 4 Not Detected     RSV, PCR Not Detected     Bordetella pertussis pcr Not Detected     Bordetella parapertussis PCR Not Detected     Chlamydophila pneumoniae PCR Not Detected     Mycoplasma pneumo by PCR Not Detected    Narrative:      Fact sheet for providers: https://docs.[employer]/wp-content/uploads/ZMS7152-8488-LQ4.1-EUA-Provider-Fact-Sheet-3.pdf    Fact sheet for patients: https://docs.[employer]/wp-content/uploads/EEZ3501-3362-HT8.1-EUA-Patient-Fact-Sheet-1.pdf    Test performed by PCR.    Blood Culture - Blood, Arm, Right [[id number]] Collected: 01/01/21 0021    Lab Status: Preliminary result Specimen: Blood from Arm, Right Updated: 01/03/21 0030     Blood Culture No growth at 2 days    Blood Culture - Blood, Arm, Right [[id number]] Collected: 12/31/20 2339    Lab Status: Preliminary result Specimen: Blood from Arm, Right Updated: 01/02/21 2345     Blood Culture No growth at 2 days    Tissue / Bone Culture - Tissue, Shoulder, Left [[id number]]  (Abnormal)  (Susceptibility) Collected: 12/24/20 2156    Lab Status: Final result  Specimen: Tissue from Shoulder, Left Updated: 12/27/20 0852     Tissue Culture Light growth (2+) Staphylococcus aureus     Gram Stain Many (4+) Red blood cells      Few (2+) WBCs per low power field      Few (2+) Gram positive cocci, intracellular and extracellular    Susceptibility      Staphylococcus aureus     OLIVIA     Clindamycin Susceptible     Erythromycin Resistant     Inducible Clindamycin Resistance Negative     Oxacillin Susceptible     Penicillin G Resistant     Rifampin Susceptible     Tetracycline Susceptible     Trimethoprim + Sulfamethoxazole Susceptible     Vancomycin Susceptible                Susceptibility Comments     Staphylococcus aureus    This isolate does not demonstrate inducible clindamycin resistance in vitro.               Body Fluid Culture - Synovial Fluid, Shoulder, Left [[id number]] Collected: 12/24/20 2133    Lab Status: Final result Specimen: Synovial Fluid from Shoulder, Left Updated: 12/30/20 0624     Body Fluid Culture No growth at 5 days     Gram Stain No organisms seen    Anaerobic Culture - Synovial Fluid, Shoulder, Left [[id number]] Collected: 12/24/20 2123    Lab Status: Final result Specimen: Synovial Fluid from Shoulder, Left Updated: 12/30/20 0609     Anaerobic Culture No anaerobes isolated at 5 days          ECG/EMG Results (most recent)     Procedure Component Value Units Date/Time    ECG 12 Lead [[id number]] Collected: 12/31/20 2014     Updated: 01/02/21 0823     QT Interval 369 ms     Narrative:      HEART RATE= 106  bpm  RR Interval= 568  ms  MA Interval= 111  ms  P Horizontal Axis= -26  deg  P Front Axis= 53  deg  QRSD Interval= 73  ms  QT Interval= 369  ms  QRS Axis= 73  deg  T Wave Axis= 8  deg  - ABNORMAL ECG -  Sinus tachycardia  Nonspecific T abnormalities, diffuse leads  When compared with ECG of 22-Dec-2020 19:47:41,  No significant change  Electronically Signed By: Merritt Hodgson ([facility]) 02-Jan-2021 08:22:08  Date and Time of Study: 2020-12-31 20:14:42    Adult  Transthoracic Echo Limited W/ Cont if Necessary Per Protocol [[id number]] Collected: 01/03/21 0848     Updated: 01/03/21 1314     BSA 2.1 m^2      TR max peyton 364.6 cm/sec      RVSP(TR) 61.2 mmHg      RAP systole 8.0 mmHg       CV ECHO ANGI - BZI_BMI 37.4 kilograms/m^2       CV ECHO ANGI - BSA(HAYCOCK) 2.2 m^2       CV ECHO ANGI - BZI_METRIC_WEIGHT 102.1 kg       CV ECHO ANGI - BZI_METRIC_HEIGHT 165.1 cm      Echo EF Estimated 60 %     Narrative:      · Estimated left ventricular EF = 60% Left ventricular systolic function   is normal.     Indications  Shortness of breath    Technically satisfactory study.  Mitral valve is structurally normal.  Tricuspid valve is structurally normal.  Moderate tricuspid regurgitation.  Aortic valve is structurally normal.  Pulmonic valve could not be well visualized.  No evidence for mitral or aortic regurgitation is seen by Doppler study.  Left atrium is normal in size.  Right atrium is enlarged  Left ventricle is normal in size and contractility with ejection fraction   of 60%.  Right ventricle is enlarged.  Atrial septum is intact.  Aorta is normal.  No pericardial effusion or intracardiac thrombus is seen.    Impression  Moderate tricuspid regurgitation.  Right atrium right ventricle enlargement.  Otherwise structurally and functionally normal cardiac valves.  Left ventricular size and contractility is normal with ejection fraction   of 60%.  No pericardial effusion is present.          Results for orders placed during the hospital encounter of 12/31/20   Duplex Venous Upper Extremity - Left CAR    Narrative · Acute left upper extremity superficial thrombophlebitis noted in the   basilic (upper arm), basilic (forearm), cephalic (upper arm), cephalic   (forearm) and median cubital.  · All other left sided vessels appear normal.          Results for orders placed during the hospital encounter of 12/31/20   Adult Transthoracic Echo Limited W/ Cont if Necessary Per Protocol     Narrative · Estimated left ventricular EF = 60% Left ventricular systolic function   is normal.     Indications  Shortness of breath    Technically satisfactory study.  Mitral valve is structurally normal.  Tricuspid valve is structurally normal.  Moderate tricuspid regurgitation.  Aortic valve is structurally normal.  Pulmonic valve could not be well visualized.  No evidence for mitral or aortic regurgitation is seen by Doppler study.  Left atrium is normal in size.  Right atrium is enlarged  Left ventricle is normal in size and contractility with ejection fraction   of 60%.  Right ventricle is enlarged.  Atrial septum is intact.  Aorta is normal.  No pericardial effusion or intracardiac thrombus is seen.    Impression  Moderate tricuspid regurgitation.  Right atrium right ventricle enlargement.  Otherwise structurally and functionally normal cardiac valves.  Left ventricular size and contractility is normal with ejection fraction   of 60%.  No pericardial effusion is present.       Xr Chest 1 View    Result Date: 12/31/2020   1. Cardiomegaly. 2. Interval development of right basilar consolidation felt to represent either pneumonia or atelectasis with a small pleural effusion.  Electronically Signed By-Raul Valdes MD On:12/31/2020 6:53 PM This report was finalized on [id number] by  Raul Valdes MD.    Ct Chest Pulmonary Embolism    Result Date: 12/31/2020  1. Large consolidation in the right lower lobe. This likely represents a combination of atelectasis and consolidative right lower lobe pneumonia. There is an additional consolidation in the right middle lobe also likely representing atelectasis and consolidative pneumonia. Continuous radiographic follow-up to complete resolution is recommended. 2. There is a moderately sized right-sided pleural effusion. There is a trace left-sided pleural effusion. There is an element of early pulmonary edema. 3. Subtle reticulonodular type infiltrate in the anterior aspect  of the right upper lobe likely representing mild infectious bronchopneumonia. 4. Hepatomegaly with hepatic steatosis. 5. Emphysema. 6. Body wall edema. 7. No definitive pulmonary embolus. Slot 63 Electronically signed by:  Marvin Martinez M.D.  12/31/2020 8:59 PM          Xrays, labs reviewed personally by physician.    Medication Review:   I have reviewed the patient's current medication list      Scheduled Meds  aspirin, 325 mg, Oral, Daily  DAPTOmycin, 6 mg/kg (Adjusted), Intravenous, Q24H  furosemide, 20 mg, Intravenous, BID  Linezolid, 600 mg, Intravenous, Q12H  sodium chloride, 10 mL, Intravenous, Q12H        Meds Infusions       Meds PRN  •  acetaminophen **OR** acetaminophen **OR** acetaminophen  •  diphenhydrAMINE  •  diphenhydrAMINE-zinc acetate  •  ondansetron **OR** ondansetron  •  oxyCODONE  •  sodium chloride        Assessment/Plan   Assessment/Plan     Active Hospital Problems    Diagnosis  POA   • Congestive heart failure (CMS/HCC) [I50.9]  Yes      Resolved Hospital Problems   No resolved problems to display.       MEDICAL DECISION MAKING COMPLEXITY BY PROBLEM:         Pneumonia, right lower and middle lobe--recent hospitalization x1 week  Respiratory panel including Covid 1/1/2021 -was negative  Blood cultures negative this hospitalization so far  Patient had 2D echo last admission  Consider MYLES  We will ask ID for evaluation  Initial antibiotics with cefepime and Vanco.  Now on Zyvox per ID  Echo 1/3/2020 shows moderate tricuspid regurgitation  Pulmonic valve could not be visualized  EF 60%-Right ventricle is enlarged-No pericardial effusion    Methicillin susceptible Staphylococcus aureus bacteremia and IV drug user.    Most likely secondary to left shoulder infection and left Acromioclavicular joint infection  2D echo showed no signs of vegetation.  Cardio service are comfortable reading the 2D echo alone since it was good imaging study.  -Previous plan Rocephin 2 g IV daily for 6 weeks  -Now  switched to daptomycin  -On Zyvox given pneumonia as well per ID.    Left upper extremity edema-  Doppler without DVT.  Superficial thrombophlebitis noted.   Acute left upper extremity superficial thrombophlebitis noted in the basilic (upper arm), basilic (forearm), cephalic (upper arm), cephalic (forearm) and median cubital. Improved.  Has good pulsations.  Elevation and supportive care recommended  Improving    Acute kidney injury  Could be cardiorenal  Nephrologist following  Could be related to vasculitis as well       Septic left shoulder joint infection on previous admission:  MSSA of the left shoulder with osteomyelitis, stable on outpatient antibiotics:  Antibiotics as above    Suspect right-sided heart failure  Elevated BNP with recent echocardiogram December 2020, showing EF 65% with pulmonary hypertension and moderate tricuspid regurgitation:   Agree with diuresis as tolerated    Rash-  Could be vasculitic  Etiology not clear  Appreciate ID input    Normocytic anemia likely of chronic disease  We will monitor  Anemia panel ordered      High risk for limb loss  dw patient  VTE Prophylaxis -   Mechanical Order History:     None      Pharmalogical Order History:      Ordered     Dose Route Frequency Stop    12/31/20 5398  enoxaparin (LOVENOX) syringe 40 mg  Status:  Discontinued      40 mg SC Daily 01/01/21 0215    01/01/21 0215  enoxaparin (LOVENOX) syringe 100 mg      1 mg/kg SC Once 01/01/21 0316                  Code Status -   Code Status and Medical Interventions:   Ordered at: 12/31/20 6061     Code Status:    CPR     Medical Interventions (Level of Support Prior to Arrest):    Full       This patient has been examined wearing appropriate Personal Protective Equipment and discussed with hospital infection control department. 01/03/21        Discharge Planning  home        Electronically signed by Julian Godoy MD, 01/03/21, 14:11 EST.  [facility] Hospitalist Team

## 2021-01-03 NOTE — PLAN OF CARE
Goal Outcome Evaluation:  Plan of Care Reviewed With: patient  Progress: no change     Patient c/o pain 10 out of 10 with little relief of pain. Rash on bilt. Lower ext. and abdomin with no changes today. Patient resting in bed with family at bedside. Will continue to monitor.

## 2021-01-03 NOTE — PROGRESS NOTES
Infectious Diseases Progress Note      LOS: 0 days   Patient Care Team:  Danie Dee MD as PCP - General    Chief Complaint: Fatigue and skin rash    Subjective     The patient had no fever during the last 24 hours.  The patient remained hemodynamically stable.  The patient is on room air and states that her rash is feeling better.  She is complaining about left shoulder pain    Review of Systems:   Review of Systems   Constitutional: Negative.    HENT: Negative.    Eyes: Negative.    Respiratory: Negative.    Cardiovascular: Positive for leg swelling.   Gastrointestinal: Negative.    Genitourinary: Negative.    Musculoskeletal: Negative.         Left shoulder pain   Skin: Positive for rash.   Neurological: Negative.    Hematological: Negative.    Psychiatric/Behavioral: Negative.         Objective     Vital Signs  Temp:  [98.1 °F (36.7 °C)-98.2 °F (36.8 °C)] 98.2 °F (36.8 °C)  Heart Rate:  [106-125] 125  Resp:  [13-15] 14  BP: (112-134)/(74-90) 112/74    Physical Exam:  Physical Exam  Vitals signs and nursing note reviewed.   Constitutional:       Appearance: She is well-developed.   HENT:      Head: Normocephalic and atraumatic.   Eyes:      Pupils: Pupils are equal, round, and reactive to light.   Neck:      Musculoskeletal: Normal range of motion and neck supple.   Cardiovascular:      Rate and Rhythm: Normal rate and regular rhythm.      Heart sounds: Normal heart sounds.   Pulmonary:      Effort: Pulmonary effort is normal. No respiratory distress.      Breath sounds: Normal breath sounds. No wheezing or rales.   Abdominal:      General: Bowel sounds are normal. There is no distension.      Palpations: Abdomen is soft. There is no mass.      Tenderness: There is no abdominal tenderness. There is no guarding or rebound.   Musculoskeletal: Normal range of motion.         General: No deformity.   Skin:     General: Skin is warm.      Findings: Rash present. No erythema.      Comments: Petechial rash mostly  involving the posterior calfs  -Resolving slowly   Neurological:      Mental Status: She is alert and oriented to person, place, and time.      Cranial Nerves: No cranial nerve deficit.          Results Review:    I have reviewed all clinical data, test, lab, and imaging results.     Radiology  No Radiology Exams Resulted Within Past 24 Hours    Cardiology    Laboratory    Results from last 7 days   Lab Units 01/03/21 0319 01/02/21  0306 01/01/21 0212 12/31/20 2142 12/29/20 0437 12/28/20  0510   WBC 10*3/mm3 6.90 7.30 7.10 8.00 9.80 11.00*   HEMOGLOBIN g/dL 10.5* 10.6* 10.2* 11.2* 10.7* 10.5*   HEMATOCRIT % 32.5* 32.1* 31.0* 34.2 32.4* 32.2*   PLATELETS 10*3/mm3 295 267 299 337 267 237     Results from last 7 days   Lab Units 01/03/21 0319 01/02/21 0306 01/01/21 0212 12/31/20 2142 12/29/20 0437 12/28/20  0510   SODIUM mmol/L 133* 134* 135* 135* 132* 129*   POTASSIUM mmol/L 3.9 4.0 4.1 4.0 3.6 3.9   CHLORIDE mmol/L 98 101 101 101 100 100   CO2 mmol/L 24.0 24.0 25.0 25.0 23.0 21.0*   BUN mg/dL 12 14 15 16 17 17   CREATININE mg/dL 1.41* 1.43* 1.42* 1.40* 0.79 0.74   GLUCOSE mg/dL 119* 95 102* 104* 136* 124*   ALBUMIN g/dL 2.50* 2.50*  --  3.00* 2.20* 2.00*   BILIRUBIN mg/dL 0.5 0.4  --  0.5 0.4 0.4   ALK PHOS U/L 123* 117  --  136* 163* 184*   AST (SGOT) U/L 17 25  --  25 27 49*   ALT (SGPT) U/L 17 18  --  26 37* 51*   CALCIUM mg/dL 8.4* 8.0* 8.1* 8.5* 8.0* 8.2*     Results from last 7 days   Lab Units 01/03/21  0319   CK TOTAL U/L 76     Results from last 7 days   Lab Units 12/28/20  0510   SED RATE mm/hr 80*         Microbiology   Microbiology Results (last 10 days)     Procedure Component Value - Date/Time    Urine Culture - Urine, Urine, Clean Catch [[id number]]  (Normal) Collected: 01/01/21 1611    Lab Status: Final result Specimen: Urine, Clean Catch Updated: 01/02/21 1537     Urine Culture No growth    Respiratory Panel PCR w/COVID-19(SARS-CoV-2) CARRIE/BARRETT/KANDY/PAD/COR/MAD/MAXIMILIANO In-House, NP Swab in UTM/VTM,  3-4 HR TAT - Swab, Nasopharynx [[id number]]  (Normal) Collected: 01/01/21 0143    Lab Status: Final result Specimen: Swab from Nasopharynx Updated: 01/01/21 0244     ADENOVIRUS, PCR Not Detected     Coronavirus 229E Not Detected     Coronavirus HKU1 Not Detected     Coronavirus NL63 Not Detected     Coronavirus OC43 Not Detected     COVID19 Not Detected     Human Metapneumovirus Not Detected     Human Rhinovirus/Enterovirus Not Detected     Influenza A PCR Not Detected     Influenza B PCR Not Detected     Parainfluenza Virus 1 Not Detected     Parainfluenza Virus 2 Not Detected     Parainfluenza Virus 3 Not Detected     Parainfluenza Virus 4 Not Detected     RSV, PCR Not Detected     Bordetella pertussis pcr Not Detected     Bordetella parapertussis PCR Not Detected     Chlamydophila pneumoniae PCR Not Detected     Mycoplasma pneumo by PCR Not Detected    Narrative:      Fact sheet for providers: https://docs.[employer]/wp-content/uploads/JYG2049-4351-GP9.1-EUA-Provider-Fact-Sheet-3.pdf    Fact sheet for patients: https://docs.[employer]/wp-content/uploads/YGN0192-6686-HX1.1-EUA-Patient-Fact-Sheet-1.pdf    Test performed by PCR.    Blood Culture - Blood, Arm, Right [[id number]] Collected: 01/01/21 0021    Lab Status: Preliminary result Specimen: Blood from Arm, Right Updated: 01/03/21 0030     Blood Culture No growth at 2 days    Blood Culture - Blood, Arm, Right [[id number]] Collected: 12/31/20 2339    Lab Status: Preliminary result Specimen: Blood from Arm, Right Updated: 01/02/21 2345     Blood Culture No growth at 2 days    Tissue / Bone Culture - Tissue, Shoulder, Left [[id number]]  (Abnormal)  (Susceptibility) Collected: 12/24/20 2156    Lab Status: Final result Specimen: Tissue from Shoulder, Left Updated: 12/27/20 0852     Tissue Culture Light growth (2+) Staphylococcus aureus     Gram Stain Many (4+) Red blood cells      Few (2+) WBCs per low power field      Few (2+) Gram positive cocci, intracellular  and extracellular    Susceptibility      Staphylococcus aureus     OLIVIA     Clindamycin Susceptible     Erythromycin Resistant     Inducible Clindamycin Resistance Negative     Oxacillin Susceptible     Penicillin G Resistant     Rifampin Susceptible     Tetracycline Susceptible     Trimethoprim + Sulfamethoxazole Susceptible     Vancomycin Susceptible                Susceptibility Comments     Staphylococcus aureus    This isolate does not demonstrate inducible clindamycin resistance in vitro.               Body Fluid Culture - Synovial Fluid, Shoulder, Left [[id number]] Collected: 12/24/20 2133    Lab Status: Final result Specimen: Synovial Fluid from Shoulder, Left Updated: 12/30/20 0624     Body Fluid Culture No growth at 5 days     Gram Stain No organisms seen    Anaerobic Culture - Synovial Fluid, Shoulder, Left [[id number]] Collected: 12/24/20 2123    Lab Status: Final result Specimen: Synovial Fluid from Shoulder, Left Updated: 12/30/20 0609     Anaerobic Culture No anaerobes isolated at 5 days          Medication Review:       Schedule Meds  aspirin, 325 mg, Oral, Daily  DAPTOmycin, 6 mg/kg (Adjusted), Intravenous, Q24H  furosemide, 20 mg, Intravenous, BID  Linezolid, 600 mg, Intravenous, Q12H  sodium chloride, 10 mL, Intravenous, Q12H        Infusion Meds       PRN Meds  •  acetaminophen **OR** acetaminophen **OR** acetaminophen  •  diphenhydrAMINE  •  diphenhydrAMINE-zinc acetate  •  ondansetron **OR** ondansetron  •  oxyCODONE  •  sodium chloride        Assessment/Plan       Antimicrobial Therapy   1.  IV daptomycin      day  2.  Zyvox      day  3.      Day  4.      Day  5.      Day      Assessment    Recent admission for methicillin susceptible Staphylococcus aureus bacteremia and IV drug user found during last admission.  Most likely secondary to left shoulder infection and left Acromioclavicular joint infection  2D echo showed no signs of vegetation from last admission.  Cardio service are comfortable  reading the 2D echo alone since it was good imaging study.  -Patient was discharged on 12/29/2020 and was receiving IV Rocephin at the ambulatory care clinic     Patient came in on 12/31/2020 with complaints of extremity swelling especially in the left extremity   -Doppler of the left extremity did not show DVT but had multiple SVTs  -Surgical incisions appear to be healing well with no obvious signs of infection     Purpuric rash on bilateral lower legs that patient complains are itching and burning  -Started 12/31/2020 after patient received IV Rocephin in ambulatory care clinic  -Likely reaction to antibiotics/ceftriaxone  -Resolving slowly     Left septic shoulder/Acromioclavicular joint infection with abscess in the trapezius musculature found on last admission  The patient is s/p washout of the left shoulder and Acromioclavicular joint debridement with drainage of trapezius muscle abscess on December 24, 2020.  Intraoperative cultures are growing methicillin susceptible Staphylococcus aureus     IV drug user  -Drug screen from last admission was positive for methamphetamines and opiates  -Hepatitis B and C and HIV screens were negative     Positive urine culture for methicillin susceptible Staph aureus found during last admission.  This is descending infection as a result of bacteremia     New finding this admission of right lower and middle lobe consolidation with right pleural effusion  -Patient denies shortness of breath and is currently on room air          Plan     Continue IV daptomycin 6 mg/kg every 24 hours for 33 days to finish up 6 weeks of treatment for MSSA bacteremia and joint infection  Continue IV Zyvox 600 mg every 12 hours for 2 weeks-can be switched to p.o. at discharge  Request a repeat 2D echo-pending report  Continue supportive care  A.m. labs including CPK  Tobacco abuse cessation  Illicit drug abuse cessation           Rossana Wilder, APRN  01/03/21  14:25 EST      Note is dictated  utilizing voice recognition software/Dragon

## 2021-01-03 NOTE — PROGRESS NOTES
" COVERING FOR   RENAL PROGRESS NOTE      Patient Care Team:  Danie Dee MD as PCP - General      Provider:  Stacie Garcia MD  Patient Name: Radha Crutis  :  1975    SUBJECTIVE:    F/U CHF/PELON    Some back pain. No dysuria. No palpitations. No SOB    Medication:  aspirin, 325 mg, Oral, Daily  DAPTOmycin, 6 mg/kg (Adjusted), Intravenous, Q24H  furosemide, 20 mg, Intravenous, BID  Linezolid, 600 mg, Intravenous, Q12H  sodium chloride, 10 mL, Intravenous, Q12H           OBJECTIVE    Vital Sign Min/Max for last 24 hours  Temp  Min: 98.1 °F (36.7 °C)  Max: 98.3 °F (36.8 °C)   BP  Min: 112/74  Max: 136/92   Pulse  Min: 106  Max: 125   Resp  Min: 13  Max: 18   SpO2  Min: 90 %  Max: 97 %   No data recorded   Weight  Min: 102 kg (225 lb)  Max: 102 kg (225 lb)     Flowsheet Rows      First Filed Value   Admission Height  165.1 cm (65\") Documented at 2020 1712   Admission Weight  101 kg (222 lb 14.2 oz) Documented at 2020 1712          No intake/output data recorded.  I/O last 3 completed shifts:  In: 1140 [P.O.:840; IV Piggyback:300]  Out: 2700 [Urine:2700]    Physical Exam:  General Appearance: alert, appears stated age and cooperative  Head: normocephalic, without obvious abnormality and atraumatic  Eyes: conjunctivae and sclerae normal and no icterus  Neck: supple and no JVD  Lungs: clear to auscultation and respirations regular  Heart: regular rhythm & normal rate and normal S1, S2 +ERENDIRA  Chest: Wall no abnormalities observed  Abdomen: normal bowel sounds and soft non-tender +OBESITY  Extremities: moves extremities well, no edema, no cyanosis and no redness  Skin: no bleeding, bruising or rash, turgor normal, color normal and no lesions noted  Neurologic: Alert, and oriented. No focal deficits    Labs:    WBC WBC   Date Value Ref Range Status   2021 6.90 3.40 - 10.80 10*3/mm3 Final   2021 7.30 3.40 - 10.80 10*3/mm3 Final   2021 7.10 3.40 - 10.80 " 10*3/mm3 Final   12/31/2020 8.00 3.40 - 10.80 10*3/mm3 Final      HGB Hemoglobin   Date Value Ref Range Status   01/03/2021 10.5 (L) 12.0 - 15.9 g/dL Final   01/02/2021 10.6 (L) 12.0 - 15.9 g/dL Final   01/01/2021 10.2 (L) 12.0 - 15.9 g/dL Final   12/31/2020 11.2 (L) 12.0 - 15.9 g/dL Final      HCT Hematocrit   Date Value Ref Range Status   01/03/2021 32.5 (L) 34.0 - 46.6 % Final   01/02/2021 32.1 (L) 34.0 - 46.6 % Final   01/01/2021 31.0 (L) 34.0 - 46.6 % Final   12/31/2020 34.2 34.0 - 46.6 % Final      Platlets No results found for: LABPLAT   MCV MCV   Date Value Ref Range Status   01/03/2021 94.6 79.0 - 97.0 fL Final   01/02/2021 95.0 79.0 - 97.0 fL Final   01/01/2021 93.4 79.0 - 97.0 fL Final   12/31/2020 93.7 79.0 - 97.0 fL Final          Sodium Sodium   Date Value Ref Range Status   01/03/2021 133 (L) 136 - 145 mmol/L Final   01/02/2021 134 (L) 136 - 145 mmol/L Final   01/01/2021 135 (L) 136 - 145 mmol/L Final   12/31/2020 135 (L) 136 - 145 mmol/L Final      Potassium Potassium   Date Value Ref Range Status   01/03/2021 3.9 3.5 - 5.2 mmol/L Final   01/02/2021 4.0 3.5 - 5.2 mmol/L Final   01/01/2021 4.1 3.5 - 5.2 mmol/L Final   12/31/2020 4.0 3.5 - 5.2 mmol/L Final      Chloride Chloride   Date Value Ref Range Status   01/03/2021 98 98 - 107 mmol/L Final   01/02/2021 101 98 - 107 mmol/L Final   01/01/2021 101 98 - 107 mmol/L Final   12/31/2020 101 98 - 107 mmol/L Final      CO2 CO2   Date Value Ref Range Status   01/03/2021 24.0 22.0 - 29.0 mmol/L Final   01/02/2021 24.0 22.0 - 29.0 mmol/L Final   01/01/2021 25.0 22.0 - 29.0 mmol/L Final   12/31/2020 25.0 22.0 - 29.0 mmol/L Final      BUN BUN   Date Value Ref Range Status   01/03/2021 12 6 - 20 mg/dL Final   01/02/2021 14 6 - 20 mg/dL Final   01/01/2021 15 6 - 20 mg/dL Final   12/31/2020 16 6 - 20 mg/dL Final      Creatinine Creatinine   Date Value Ref Range Status   01/03/2021 1.41 (H) 0.57 - 1.00 mg/dL Final   01/02/2021 1.43 (H) 0.57 - 1.00 mg/dL Final    01/01/2021 1.42 (H) 0.57 - 1.00 mg/dL Final   12/31/2020 1.40 (H) 0.57 - 1.00 mg/dL Final      Calcium Calcium   Date Value Ref Range Status   01/03/2021 8.4 (L) 8.6 - 10.5 mg/dL Final   01/02/2021 8.0 (L) 8.6 - 10.5 mg/dL Final   01/01/2021 8.1 (L) 8.6 - 10.5 mg/dL Final   12/31/2020 8.5 (L) 8.6 - 10.5 mg/dL Final      PO4 No components found for: PO4   Albumin Albumin   Date Value Ref Range Status   01/03/2021 2.50 (L) 3.50 - 5.20 g/dL Final   01/02/2021 2.50 (L) 3.50 - 5.20 g/dL Final   12/31/2020 3.00 (L) 3.50 - 5.20 g/dL Final      Magnesium Magnesium   Date Value Ref Range Status   01/03/2021 1.7 1.6 - 2.6 mg/dL Final      Uric Acid No components found for: URIC ACID     Imaging Results (Last 72 Hours)     Procedure Component Value Units Date/Time    CT Chest Pulmonary Embolism [[id number]] Collected: 12/31/20 2054     Updated: 12/31/20 2300    Narrative:      Exam: CTA thorax, PE protocol    Date: December 31, 2020    History: Bilateral lower extremity edema, shortness of breath, recent shoulder surgery    Comparison: None available    Technique: Contiguous axial CT images obtained of the thorax following the uneventful administration of 69 mL Isovue-370 contrast. Additionally, sagittal, coronal and 3-D reformatted images were obtained. CT dose lowering techniques were used, to   include: automated exposure control, adjustment for patient size, and or use of iterative reconstruction.    Findings:    Thoracic aorta: There is no aneurysm or dissection.    HEART: The heart is not enlarged.    Pulmonary arteries: The pulmonary arteries are reasonably well visualized. There is some limitation secondary to respiratory motion artifact. There is no filling defect to suggest an acute pulmonary embolus.    Mediastinum: There is no pathologic mediastinal adenopathy.    Lung parenchyma: There is a background of emphysema. There is a mild reticulonodular type infiltrate in the anterior aspect of the right upper lobe.  There is a moderately sized right-sided pleural effusion. There is a large consolidation in the right   lower lobe. There is also a consolidation in the right middle lobe. The lung volumes are markedly diminished. There is an element of interlobular septal thickening in the periphery of both lungs. There is a trace left-sided pleural effusion.    Upper abdomen: The gallbladder is surgically absent. The liver is enlarged and diffusely hypodense.    MUSCULOSKELETAL: There is body wall edema. No acute fractures are identified.      Impression:      1. Large consolidation in the right lower lobe. This likely represents a combination of atelectasis and consolidative right lower lobe pneumonia. There is an additional consolidation in the right middle lobe also likely representing atelectasis and   consolidative pneumonia. Continuous radiographic follow-up to complete resolution is recommended.  2. There is a moderately sized right-sided pleural effusion. There is a trace left-sided pleural effusion. There is an element of early pulmonary edema.  3. Subtle reticulonodular type infiltrate in the anterior aspect of the right upper lobe likely representing mild infectious bronchopneumonia.   4. Hepatomegaly with hepatic steatosis.  5. Emphysema.  6. Body wall edema.  7. No definitive pulmonary embolus.        Slot 63    Electronically signed by:  Marvin Martinez M.D.    12/31/2020 8:59 PM    XR Chest 1 View [[id number]] Collected: 12/31/20 1852     Updated: 12/31/20 1855    Narrative:      DATE OF EXAM:  12/31/2020 6:44 PM     PROCEDURE:  XR CHEST 1 VW-     INDICATIONS:  Shortness of breath, lower extremity swelling, hypertension.      COMPARISON:  12/22/2020.     TECHNIQUE:   Single radiographic view of the chest was obtained.     FINDINGS:  The heart is enlarged. There is abnormal right basilar consolidation  felt to represent either pneumonia or atelectasis with a small pleural  effusion. The left lung and pleural space are  clear. The pulmonary  vascular markings are normal.  The bony thorax is normal for age.       Impression:         1. Cardiomegaly.  2. Interval development of right basilar consolidation felt to represent  either pneumonia or atelectasis with a small pleural effusion.     Electronically Signed By-Raul Valdes MD On:12/31/2020 6:53 PM  This report was finalized on [id number] by  Raul Valdes MD.          Results for orders placed during the hospital encounter of 12/31/20   XR Chest 1 View    Narrative DATE OF EXAM:  12/31/2020 6:44 PM     PROCEDURE:  XR CHEST 1 VW-     INDICATIONS:  Shortness of breath, lower extremity swelling, hypertension.      COMPARISON:  12/22/2020.     TECHNIQUE:   Single radiographic view of the chest was obtained.     FINDINGS:  The heart is enlarged. There is abnormal right basilar consolidation  felt to represent either pneumonia or atelectasis with a small pleural  effusion. The left lung and pleural space are clear. The pulmonary  vascular markings are normal.  The bony thorax is normal for age.       Impression    1. Cardiomegaly.  2. Interval development of right basilar consolidation felt to represent  either pneumonia or atelectasis with a small pleural effusion.     Electronically Signed By-Raul Valdes MD On:12/31/2020 6:53 PM  This report was finalized on [id number] by  Raul Valdes MD.       Results for orders placed during the hospital encounter of 12/31/20   Duplex Venous Upper Extremity - Left CAR    Narrative · Acute left upper extremity superficial thrombophlebitis noted in the   basilic (upper arm), basilic (forearm), cephalic (upper arm), cephalic   (forearm) and median cubital.  · All other left sided vessels appear normal.            ASSESSMENT / PLAN      Congestive heart failure (CMS/HCC)      Acute kidney injury with stable BUN/Cr. Acid-base, lytes okay.  Volume excess improving.  Hyponatremia----Stable with diuresis  Pneumonia/Sepsis clinically improved.  MSSA  bacteremia from the left shoulder with osteomyelitis----Follow CK as patient on Daptomycin  Anemia with stable H/H.  HTN with stable BP.  Mild Hypomagnesemia----Replace IV                Stacie Garcia MD  [facility]  [phone]  01/03/21  09:26 EST

## 2021-01-03 NOTE — PLAN OF CARE
Goal Outcome Evaluation:  Plan of Care Reviewed With: patient  Progress: no change     C/O moderate pain on her left shoulder, pain med given with relief. Will continue to monitor.

## 2021-01-04 LAB
ALBUMIN SERPL-MCNC: 2.5 G/DL (ref 3.5–5.2)
ALBUMIN/GLOB SERPL: 0.8 G/DL
ALP SERPL-CCNC: 122 U/L (ref 39–117)
ALT SERPL W P-5'-P-CCNC: 17 U/L (ref 1–33)
ANION GAP SERPL CALCULATED.3IONS-SCNC: 7 MMOL/L (ref 5–15)
AST SERPL-CCNC: 22 U/L (ref 1–32)
BASOPHILS # BLD AUTO: 0.1 10*3/MM3 (ref 0–0.2)
BASOPHILS NFR BLD AUTO: 0.7 % (ref 0–1.5)
BILIRUB SERPL-MCNC: 0.4 MG/DL (ref 0–1.2)
BUN SERPL-MCNC: 11 MG/DL (ref 6–20)
BUN/CREAT SERPL: 7.4 (ref 7–25)
CA-I SERPL ISE-MCNC: 1.15 MMOL/L (ref 1.2–1.3)
CALCIUM SPEC-SCNC: 8.2 MG/DL (ref 8.6–10.5)
CHLORIDE SERPL-SCNC: 99 MMOL/L (ref 98–107)
CK SERPL-CCNC: 44 U/L (ref 20–180)
CO2 SERPL-SCNC: 26 MMOL/L (ref 22–29)
CREAT SERPL-MCNC: 1.49 MG/DL (ref 0.57–1)
CRP SERPL-MCNC: 4.44 MG/DL (ref 0–0.5)
DEPRECATED RDW RBC AUTO: 55.6 FL (ref 37–54)
EOSINOPHIL # BLD AUTO: 0.1 10*3/MM3 (ref 0–0.4)
EOSINOPHIL NFR BLD AUTO: 1.7 % (ref 0.3–6.2)
ERYTHROCYTE [DISTWIDTH] IN BLOOD BY AUTOMATED COUNT: 16.8 % (ref 12.3–15.4)
GFR SERPL CREATININE-BSD FRML MDRD: 38 ML/MIN/1.73
GLOBULIN UR ELPH-MCNC: 3.3 GM/DL
GLUCOSE BLDC GLUCOMTR-MCNC: 115 MG/DL (ref 70–105)
GLUCOSE SERPL-MCNC: 104 MG/DL (ref 65–99)
HCT VFR BLD AUTO: 31.4 % (ref 34–46.6)
HGB BLD-MCNC: 10 G/DL (ref 12–15.9)
LYMPHOCYTES # BLD AUTO: 1.6 10*3/MM3 (ref 0.7–3.1)
LYMPHOCYTES NFR BLD AUTO: 21 % (ref 19.6–45.3)
MAGNESIUM SERPL-MCNC: 2.1 MG/DL (ref 1.6–2.6)
MCH RBC QN AUTO: 30.1 PG (ref 26.6–33)
MCHC RBC AUTO-ENTMCNC: 31.9 G/DL (ref 31.5–35.7)
MCV RBC AUTO: 94.5 FL (ref 79–97)
MONOCYTES # BLD AUTO: 0.7 10*3/MM3 (ref 0.1–0.9)
MONOCYTES NFR BLD AUTO: 9.8 % (ref 5–12)
NEUTROPHILS NFR BLD AUTO: 5 10*3/MM3 (ref 1.7–7)
NEUTROPHILS NFR BLD AUTO: 66.8 % (ref 42.7–76)
NRBC BLD AUTO-RTO: 0 /100 WBC (ref 0–0.2)
PHOSPHATE SERPL-MCNC: 4.3 MG/DL (ref 2.5–4.5)
PLATELET # BLD AUTO: 261 10*3/MM3 (ref 140–450)
PMV BLD AUTO: 8.1 FL (ref 6–12)
POTASSIUM SERPL-SCNC: 4 MMOL/L (ref 3.5–5.2)
PROT SERPL-MCNC: 5.8 G/DL (ref 6–8.5)
RBC # BLD AUTO: 3.32 10*6/MM3 (ref 3.77–5.28)
SODIUM SERPL-SCNC: 132 MMOL/L (ref 136–145)
WBC # BLD AUTO: 7.5 10*3/MM3 (ref 3.4–10.8)

## 2021-01-04 PROCEDURE — 25010000002 CALCIUM GLUCONATE-NACL 1-0.675 GM/50ML-% SOLUTION: Performed by: INTERNAL MEDICINE

## 2021-01-04 PROCEDURE — 83735 ASSAY OF MAGNESIUM: CPT | Performed by: INTERNAL MEDICINE

## 2021-01-04 PROCEDURE — 25010000002 ONDANSETRON PER 1 MG: Performed by: NURSE PRACTITIONER

## 2021-01-04 PROCEDURE — 99233 SBSQ HOSP IP/OBS HIGH 50: CPT | Performed by: HOSPITALIST

## 2021-01-04 PROCEDURE — 84100 ASSAY OF PHOSPHORUS: CPT | Performed by: INTERNAL MEDICINE

## 2021-01-04 PROCEDURE — 25010000002 DAPTOMYCIN PER 1 MG: Performed by: NURSE PRACTITIONER

## 2021-01-04 PROCEDURE — 82330 ASSAY OF CALCIUM: CPT | Performed by: INTERNAL MEDICINE

## 2021-01-04 PROCEDURE — 25010000002 FUROSEMIDE PER 20 MG: Performed by: INTERNAL MEDICINE

## 2021-01-04 PROCEDURE — 85025 COMPLETE CBC W/AUTO DIFF WBC: CPT | Performed by: INTERNAL MEDICINE

## 2021-01-04 PROCEDURE — 82962 GLUCOSE BLOOD TEST: CPT

## 2021-01-04 PROCEDURE — 25010000002 LINEZOLID 600 MG/300ML SOLUTION: Performed by: NURSE PRACTITIONER

## 2021-01-04 PROCEDURE — 86140 C-REACTIVE PROTEIN: CPT | Performed by: INTERNAL MEDICINE

## 2021-01-04 PROCEDURE — 80053 COMPREHEN METABOLIC PANEL: CPT | Performed by: INTERNAL MEDICINE

## 2021-01-04 PROCEDURE — 82550 ASSAY OF CK (CPK): CPT | Performed by: INTERNAL MEDICINE

## 2021-01-04 RX ORDER — CALCIUM GLUCONATE 20 MG/ML
1 INJECTION, SOLUTION INTRAVENOUS EVERY 12 HOURS
Status: COMPLETED | OUTPATIENT
Start: 2021-01-04 | End: 2021-01-04

## 2021-01-04 RX ORDER — CALCIUM CARBONATE 200(500)MG
2 TABLET,CHEWABLE ORAL 3 TIMES DAILY PRN
Status: DISCONTINUED | OUTPATIENT
Start: 2021-01-04 | End: 2021-01-06 | Stop reason: HOSPADM

## 2021-01-04 RX ORDER — BUMETANIDE 1 MG/1
1 TABLET ORAL 2 TIMES DAILY
Status: DISCONTINUED | OUTPATIENT
Start: 2021-01-04 | End: 2021-01-06 | Stop reason: HOSPADM

## 2021-01-04 RX ADMIN — LINEZOLID 600 MG: 600 INJECTION, SOLUTION INTRAVENOUS at 06:06

## 2021-01-04 RX ADMIN — OXYCODONE 5 MG: 5 TABLET ORAL at 01:21

## 2021-01-04 RX ADMIN — BUMETANIDE 1 MG: 1 TABLET ORAL at 20:40

## 2021-01-04 RX ADMIN — LINEZOLID 600 MG: 600 INJECTION, SOLUTION INTRAVENOUS at 17:40

## 2021-01-04 RX ADMIN — CALCIUM CARBONATE 2 TABLET: 500 TABLET, CHEWABLE ORAL at 16:02

## 2021-01-04 RX ADMIN — CALCIUM GLUCONATE 1 G: 20 INJECTION, SOLUTION INTRAVENOUS at 20:41

## 2021-01-04 RX ADMIN — ACETAMINOPHEN ORAL SOLUTION 650 MG: 650 SOLUTION ORAL at 14:00

## 2021-01-04 RX ADMIN — OXYCODONE 5 MG: 5 TABLET ORAL at 08:18

## 2021-01-04 RX ADMIN — OXYCODONE 5 MG: 5 TABLET ORAL at 14:00

## 2021-01-04 RX ADMIN — Medication 10 ML: at 20:41

## 2021-01-04 RX ADMIN — FUROSEMIDE 20 MG: 10 INJECTION, SOLUTION INTRAMUSCULAR; INTRAVENOUS at 08:18

## 2021-01-04 RX ADMIN — ONDANSETRON 4 MG: 2 INJECTION, SOLUTION INTRAMUSCULAR; INTRAVENOUS at 08:18

## 2021-01-04 RX ADMIN — DAPTOMYCIN 450 MG: 500 INJECTION, POWDER, LYOPHILIZED, FOR SOLUTION INTRAVENOUS at 16:02

## 2021-01-04 RX ADMIN — ACETAMINOPHEN ORAL SOLUTION 650 MG: 650 SOLUTION ORAL at 08:17

## 2021-01-04 RX ADMIN — OXYCODONE 5 MG: 5 TABLET ORAL at 20:40

## 2021-01-04 RX ADMIN — CALCIUM GLUCONATE 1 G: 20 INJECTION, SOLUTION INTRAVENOUS at 10:54

## 2021-01-04 NOTE — PLAN OF CARE
Goal Outcome Evaluation:  Plan of Care Reviewed With: patient  Progress: improving   Patient continues to have chronic pain, iv site changed due to pain and patient request.  Will continue to monitor

## 2021-01-04 NOTE — PROGRESS NOTES
" COVERING FOR   RENAL PROGRESS NOTE      Patient Care Team:  Danie Dee MD as PCP - General      Provider:  Stacie Garcia MD  Patient Name: Radha Curtis  :  1975    SUBJECTIVE:    F/U CHF/PELON    Some nausea post abx. No SOB, CP, dysuria    Medication:  aspirin, 325 mg, Oral, Daily  DAPTOmycin, 6 mg/kg (Adjusted), Intravenous, Q24H  furosemide, 20 mg, Intravenous, BID  Linezolid, 600 mg, Intravenous, Q12H  sodium chloride, 10 mL, Intravenous, Q12H           OBJECTIVE    Vital Sign Min/Max for last 24 hours  Temp  Min: 97.9 °F (36.6 °C)  Max: 98.7 °F (37.1 °C)   BP  Min: 112/74  Max: 137/74   Pulse  Min: 102  Max: 125   Resp  Min: 15  Max: 19   SpO2  Min: 90 %  Max: 94 %   No data recorded   Weight  Min: 102 kg (225 lb)  Max: 102 kg (225 lb)     Flowsheet Rows      First Filed Value   Admission Height  165.1 cm (65\") Documented at 2020 171   Admission Weight  101 kg (222 lb 14.2 oz) Documented at 2020 1712          No intake/output data recorded.  I/O last 3 completed shifts:  In: 360 [P.O.:360]  Out: 2300 [Urine:2300]    Physical Exam:  General Appearance: alert, appears stated age and cooperative  Head: normocephalic, without obvious abnormality and atraumatic  Eyes: conjunctivae and sclerae normal and no icterus  Neck: supple and no JVD  Lungs: clear to auscultation and respirations regular  Heart: regular rhythm & normal rate and normal S1, S2 +ERENDIRA  Chest: Wall no abnormalities observed  Abdomen: normal bowel sounds and soft non-tender +OBESITY  Extremities: moves extremities well, no edema, no cyanosis and no redness  Skin: no bleeding, bruising or rash, turgor normal, color normal and no lesions noted  Neurologic: Alert, and oriented. No focal deficits    Labs:    WBC WBC   Date Value Ref Range Status   2021 7.50 3.40 - 10.80 10*3/mm3 Final   2021 6.90 3.40 - 10.80 10*3/mm3 Final   2021 7.30 3.40 - 10.80 10*3/mm3 Final      HGB " Hemoglobin   Date Value Ref Range Status   01/04/2021 10.0 (L) 12.0 - 15.9 g/dL Final   01/03/2021 10.5 (L) 12.0 - 15.9 g/dL Final   01/02/2021 10.6 (L) 12.0 - 15.9 g/dL Final      HCT Hematocrit   Date Value Ref Range Status   01/04/2021 31.4 (L) 34.0 - 46.6 % Final   01/03/2021 32.5 (L) 34.0 - 46.6 % Final   01/02/2021 32.1 (L) 34.0 - 46.6 % Final      Platlets No results found for: LABPLAT   MCV MCV   Date Value Ref Range Status   01/04/2021 94.5 79.0 - 97.0 fL Final   01/03/2021 94.6 79.0 - 97.0 fL Final   01/02/2021 95.0 79.0 - 97.0 fL Final          Sodium Sodium   Date Value Ref Range Status   01/04/2021 132 (L) 136 - 145 mmol/L Final   01/03/2021 133 (L) 136 - 145 mmol/L Final   01/02/2021 134 (L) 136 - 145 mmol/L Final      Potassium Potassium   Date Value Ref Range Status   01/04/2021 4.0 3.5 - 5.2 mmol/L Final   01/03/2021 3.9 3.5 - 5.2 mmol/L Final   01/02/2021 4.0 3.5 - 5.2 mmol/L Final      Chloride Chloride   Date Value Ref Range Status   01/04/2021 99 98 - 107 mmol/L Final   01/03/2021 98 98 - 107 mmol/L Final   01/02/2021 101 98 - 107 mmol/L Final      CO2 CO2   Date Value Ref Range Status   01/04/2021 26.0 22.0 - 29.0 mmol/L Final   01/03/2021 24.0 22.0 - 29.0 mmol/L Final   01/02/2021 24.0 22.0 - 29.0 mmol/L Final      BUN BUN   Date Value Ref Range Status   01/04/2021 11 6 - 20 mg/dL Final   01/03/2021 12 6 - 20 mg/dL Final   01/02/2021 14 6 - 20 mg/dL Final      Creatinine Creatinine   Date Value Ref Range Status   01/04/2021 1.49 (H) 0.57 - 1.00 mg/dL Final   01/03/2021 1.41 (H) 0.57 - 1.00 mg/dL Final   01/02/2021 1.43 (H) 0.57 - 1.00 mg/dL Final      Calcium Calcium   Date Value Ref Range Status   01/04/2021 8.2 (L) 8.6 - 10.5 mg/dL Final   01/03/2021 8.4 (L) 8.6 - 10.5 mg/dL Final   01/02/2021 8.0 (L) 8.6 - 10.5 mg/dL Final      PO4 No components found for: PO4   Albumin Albumin   Date Value Ref Range Status   01/04/2021 2.50 (L) 3.50 - 5.20 g/dL Final   01/03/2021 2.50 (L) 3.50 - 5.20 g/dL  Final   01/02/2021 2.50 (L) 3.50 - 5.20 g/dL Final      Magnesium Magnesium   Date Value Ref Range Status   01/04/2021 2.1 1.6 - 2.6 mg/dL Final   01/03/2021 1.7 1.6 - 2.6 mg/dL Final      Uric Acid No components found for: URIC ACID     Imaging Results (Last 72 Hours)     ** No results found for the last 72 hours. **          Results for orders placed during the hospital encounter of 12/31/20   XR Chest 1 View    Narrative DATE OF EXAM:  12/31/2020 6:44 PM     PROCEDURE:  XR CHEST 1 VW-     INDICATIONS:  Shortness of breath, lower extremity swelling, hypertension.      COMPARISON:  12/22/2020.     TECHNIQUE:   Single radiographic view of the chest was obtained.     FINDINGS:  The heart is enlarged. There is abnormal right basilar consolidation  felt to represent either pneumonia or atelectasis with a small pleural  effusion. The left lung and pleural space are clear. The pulmonary  vascular markings are normal.  The bony thorax is normal for age.       Impression    1. Cardiomegaly.  2. Interval development of right basilar consolidation felt to represent  either pneumonia or atelectasis with a small pleural effusion.     Electronically Signed By-Raul Valdes MD On:12/31/2020 6:53 PM  This report was finalized on [id number] by  Raul Valdes MD.       Results for orders placed during the hospital encounter of 12/31/20   Duplex Venous Upper Extremity - Left CAR    Narrative · Acute left upper extremity superficial thrombophlebitis noted in the   basilic (upper arm), basilic (forearm), cephalic (upper arm), cephalic   (forearm) and median cubital.  · All other left sided vessels appear normal.            ASSESSMENT / PLAN      Congestive heart failure (CMS/HCC)      Acute kidney injury with stable BUN/Cr. Acid-base, lytes okay.  Volume excess improving. Stop IV Lasix and put on po Bumex.  Hyponatremia----Stable with diuresis  Pneumonia/Sepsis clinically improved.  MSSA bacteremia from the left shoulder with  osteomyelitis----Follow CK as patient on Daptomycin  Anemia with stable H/H.  HTN with stable BP.  Mild Hypomagnesemia----Replaced  Hypocalcemia to be replaced IV             Stacie Garcia MD  [facility]  [phone]  01/04/21  08:22 EST

## 2021-01-04 NOTE — DISCHARGE INSTR - APPOINTMENTS
Please call to schedule primary care appt-the following are accepting new patients    [facility] [phone]  [facility] [phone]

## 2021-01-04 NOTE — PROGRESS NOTES
Discharge Planning Assessment  [facility]     Patient Name: Radha Curtis  MRN: [id number]  Today's Date: 1/4/2021    Admit Date: 12/31/2020    Discharge Needs Assessment     Row Name 01/04/21 1501       Living Environment    Lives With  spouse    Current Living Arrangements  home/apartment/condo    Primary Care Provided by  self    Provides Primary Care For  no one    Family Caregiver if Needed  spouse    Quality of Family Relationships  helpful    Able to Return to Prior Arrangements  yes       Resource/Environmental Concerns    Resource/Environmental Concerns  none    Transportation Concerns  car, none       Transition Planning    Patient/Family Anticipates Transition to  home with family    Patient/Family Anticipated Services at Transition  none    Transportation Anticipated  family or friend will provide       Discharge Needs Assessment    Equipment Currently Used at Home  shower chair    Concerns to be Addressed  denies needs/concerns at this time    Anticipated Changes Related to Illness  none    Equipment Needed After Discharge  none        Discharge Plan     Row Name 01/04/21 9341       Plan    Plan  D/C Plan: Home with family. [facility] for IV abx-will need orders. Primary care provider information added to AVS per pt request.    Plan Comments   spoke to patient at bedside wearing mask and goggles and keeping distance greater than 6 feet and spent less than 15 minutes in room. Patient lives with spouse, is IADLs and spouse drives. Patient does not have a PCP-patient would like information of PCP in [facility]-PCP information added to AVS for patient to call and schedule appt. Pharmacy verified-denies any difficulty affording meds. Patient current with [facility] for IV abx-patient is agreeable and spouse will provide transportation. Barrier to D/C: IV abx, IV lasix.    Row Name 01/04/21 6309       Plan    Plan Comments  SW spoke to pt at bedside (wearing  appropriate PPE, 6 FT, 15 MIN). Pt admantly denies IV drug use. Pt states she does not use drugs at all. Pt therefore declines any offer of substance abuse treatment resources. Pt states she does not have a PCP. SW offered [facility] PCP. Pt declines citing [facility] is not a preferred PCP for her personally. SW notified RN CM.          Expected Discharge Date and Time     Expected Discharge Date Expected Discharge Time    Jan 5, 2021         Demographic Summary     Row Name 01/04/21 1500       General Information    Admission Type  inpatient    Arrived From  emergency department    Referral Source  admission list    Reason for Consult  discharge planning    Preferred Language  English     Used During This Interaction  no        Functional Status     Row Name 01/04/21 1500       Functional Status    Usual Activity Tolerance  moderate    Current Activity Tolerance  moderate       Functional Status, IADL    Medications  independent    Meal Preparation  independent    Housekeeping  independent    Laundry  independent    Shopping  independent       Mental Status    General Appearance WDL  WDL       Mental Status Summary    Recent Changes in Mental Status/Cognitive Functioning  no changes        Monique Soto

## 2021-01-04 NOTE — PROGRESS NOTES
[facility] Medicine Services Daily Progress Note      Hospitalist Team  LOS 0 days      Patient Care Team:  Danie Dee MD as PCP - General    Patient Location: 4125/1      Subjective   Subjective     Chief Complaint / Subjective  Chief Complaint   Patient presents with   • Leg Swelling         Brief Synopsis of Hospital Course/HPI    45-year-old female presents to the ER with chief complaint of swelling to her left upper extremity and bilateral lower extremities as well as rash to her bilateral lower extremities which started today.  The patient has had cough without sputum production.  She denies subjective fever or chills.  The patient adamantly denies IV drug use although it is documented in review of records.  The patient states she does not know how that ended up in her records.  She reiterates that she has not ever, not even once, used IV drugs.       Recent hospital admission 12/22/2020 through 12/29/2020 for severe sepsis, UTI, hyponatremia.  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  Drain was left in and removed on 12/29/2020.  MSSA culture with recommendation for Rocephin x6 weeks.  Patient had MYLES 12/22/2020 showing LV function 65%, moderate TR, moderate pulmonary hypertension.  No vegetations seen.  Status post MRI of shoulder 12/24/2020 showing osteomyelitis.  Edema versus abscess.            Date:  1/1/21  Hurting and crying from lt ue pain  Pending v doppler ule  Resumed diuretics, added pain meds  Factious diseases and nephrology were consulted    1/2/21  Swelling left upper extremity improved  Venous Doppler left upper extremity showed superficial thrombophlebitis without DVT  Rash discussed with Dr. Pruitt   Will repeat 2D echo and may need MYLES    1/3/2021  She said she doesn't like the bed and wants to go home  Denies new events or symptoms  CRP slowly trending down  Has been afebrile but had mild tachycardia    1/4/2021  The patient  "denies specific complaints.  She expressed frustration with her ongoing health problems.  She had questions about needing the MYLES and all questions seem to be answered to her satisfaction.  I discussed the case with FAITH VAZQUEZ.    ROS  All other systems reviewed and negative    Objective   Objective      Vital Signs  Temp:  [97.9 °F (36.6 °C)-98.7 °F (37.1 °C)] 98.7 °F (37.1 °C)  Heart Rate:  [102] 102  Resp:  [15-19] 15  BP: (123-137)/(74-86) 137/74  Oxygen Therapy  SpO2: 94 %  Pulse Oximetry Type: Intermittent  Device (Oxygen Therapy): room air  Flow (L/min): 2  Flowsheet Rows      First Filed Value   Admission Height  165.1 cm (65\") Documented at 12/31/2020 1712   Admission Weight  101 kg (222 lb 14.2 oz) Documented at 12/31/2020 1712        Intake & Output (last 3 days)       01/01 0701 - 01/02 0700 01/02 0701 - 01/03 0700 01/03 0701 - 01/04 0700 01/04 0701 - 01/05 0700    P.O. 1200 840 360 240    IV Piggyback  300      Total Intake(mL/kg) 1200 (11.8) 1140 (11.2) 360 (3.5) 240 (2.4)    Urine (mL/kg/hr) 400 (0.2) 2300 (0.9) 1600 (0.7) 300 (0.9)    Stool  0      Total Output 400 2300 1600 300    Net +800 -1160 -1240 -60            Urine Unmeasured Occurrence 1 x       Stool Unmeasured Occurrence  1 x          Lines, Drains & Airways    Active LDAs     Name:   Placement date:   Placement time:   Site:   Days:    Peripheral IV 12/31/20 2148 Anterior;Distal;Right;Upper Arm   12/31/20 2148    Arm   less than 1                  Physical Exam:    Vital signs and nurses notes reviewed.  Well-developed over-nourished female awake and alert, comfortable on room air in no acute distress sitting up in bed awake and alert; mucous membranes moist; sclerae anicteric; lungs clear to auscultation bilaterally; CV regular rate and rhythm; abdomen soft nontender nondistended with active bowel sounds; extremities with no edema, cyanosis or calf tenderness; palpable pedal pulses bilaterally; no appreciable left upper extremity " swelling; bilateral lower extremity rash appears to be improving; no Guerrero catheter.       Wounds (last 24 hours)      LDA Wound     Row Name 01/04/21 0701 01/04/21 0300 01/03/21 2300       Wound 12/24/20 2219 Left anterior shoulder Incision    Wound - Properties Group Placement Date: 12/24/20  -PHILLIP Placement Time: 2219  -PHILLIP Present on Hospital Admission: Y  -PHILLIP Side: Left  -PHILLIP Orientation: anterior  -PHILLIP Location: shoulder  -PHILLIP Primary Wound Type: Incision  -PHILLIP    Dressing Appearance  dry;intact;no drainage  -LB  dry;intact;no drainage  -AB  dry;intact;no drainage  -AB    Closure  NATHANAEL  -LB  NATHANAEL  -AB  NATHANAEL  -AB    Base  --  dressing in place, unable to visualize  -AB  dressing in place, unable to visualize  -AB    Drainage Amount  none  -LB  none  -AB  none  -AB    Dressing Care  border dressing  -LB  --  --    Retired Wound - Properties Group Date first assessed: 12/24/20  -PHILLIP Time first assessed: 2219  -PHILLIP Present on Hospital Admission: Y  -PHILLIP Side: Left  -PHILLIP Location: shoulder  -PHILLIP Primary Wound Type: Incision  -PHILLIP       Rash 12/31/20 0750 medial abdomen other (see comments)    Rash - Properties Group Placement Date: 12/31/20  -CS Placement Time: 0750  -CS Orientation: medial  -CS Location: abdomen  -CS Type: other (see comments)  -CS Additional Comments: red pinpoint  -CS    Borders  irregular  -LB  --  --    Color  purple  -LB  --  --    Retired Rash - Properties Group Date first assessed: 12/31/20  -CS Time first assessed: 0750  -CS Orientation: medial  -CS Location: abdomen  -CS Type: other (see comments)  -CS Additional Comments: red pinpoint  -CS       Rash 01/01/21 0751 Left calf patch    Rash - Properties Group Placement Date: 01/01/21  -CS Placement Time: 0751  -CS Side: Left  -CS Location: calf  -CS Type: patch  -CS    Borders  irregular  -LB  --  --    Color  purple  -LB  --  --    Retired Rash - Properties Group Date first assessed: 01/01/21  -CS Time first assessed: 0751  -CS Side: Left  -CS Location:  calf  -CS Type: patch  -CS       Rash 01/01/21 0751 Right calf patch    Rash - Properties Group Placement Date: 01/01/21  -CS Placement Time: 0751  -CS Side: Right  -CS Location: calf  -CS Type: patch  -CS    Borders  irregular  -LB  --  --    Color  purple  -LB  --  --    Retired Rash - Properties Group Date first assessed: 01/01/21  -CS Time first assessed: 0751  -CS Side: Right  -CS Location: calf  -CS Type: patch  -CS    Row Name 01/03/21 2200             Wound 12/24/20 2219 Left anterior shoulder Incision    Wound - Properties Group Placement Date: 12/24/20  -PHILLIP Placement Time: 2219  -PHILLIP Present on Hospital Admission: Y  -PHILLIP Side: Left  -PHILLIP Orientation: anterior  -PHILLIP Location: shoulder  -PHILLIP Primary Wound Type: Incision  -PHILLIP    Dressing Appearance  dressing loose;no drainage;dry  -AB      Closure  Staples  -AB      Base  dry;clean  -AB      Periwound  intact;dry  -AB      Periwound Temperature  warm  -AB      Periwound Skin Turgor  soft  -AB      Drainage Amount  none  -AB      Dressing Care  dressing changed;border dressing  -AB      Retired Wound - Properties Group Date first assessed: 12/24/20  -PHILLIP Time first assessed: 2219  -PHILLIP Present on Hospital Admission: Y  -PHILLIP Side: Left  -PHILLIP Location: shoulder  -PHILLIP Primary Wound Type: Incision  -PHILLIP       Rash 12/31/20 0750 medial abdomen other (see comments)    Rash - Properties Group Placement Date: 12/31/20  -CS Placement Time: 0750  -CS Orientation: medial  -CS Location: abdomen  -CS Type: other (see comments)  -CS Additional Comments: red pinpoint  -CS    Distribution  localized  -AB      Configuration/Shape  oval  -AB      Borders  irregular  -AB      Characteristics  dry;other (see comments) bruising  -AB      Color  purple;red  -AB      Lesion Size  greater than 1 cm  -AB      Care, Rash  open to air  -AB      Retired Rash - Properties Group Date first assessed: 12/31/20  -CS Time first assessed: 0750  -CS Orientation: medial  -CS Location: abdomen  -CS Type:  other (see comments)  -CS Additional Comments: red pinpoint  -CS       Rash 01/01/21 0751 Left calf patch    Rash - Properties Group Placement Date: 01/01/21  -CS Placement Time: 0751  -CS Side: Left  -CS Location: calf  -CS Type: patch  -CS    Distribution  localized  -AB      Configuration/Shape  round  -AB      Borders  irregular  -AB      Characteristics  dry  -AB      Color  purple;red  -AB      Lesion Size  greater than 1 cm  -AB      Care, Rash  open to air  -AB      Retired Rash - Properties Group Date first assessed: 01/01/21  -CS Time first assessed: 0751  -CS Side: Left  -CS Location: calf  -CS Type: patch  -CS       Rash 01/01/21 0751 Right calf patch    Rash - Properties Group Placement Date: 01/01/21  -CS Placement Time: 0751  -CS Side: Right  -CS Location: calf  -CS Type: patch  -CS    Distribution  localized  -AB      Configuration/Shape  round  -AB      Borders  irregular  -AB      Characteristics  dry  -AB      Color  purple;red  -AB      Lesion Size  greater than 1 cm  -AB      Care, Rash  open to air  -AB      Retired Rash - Properties Group Date first assessed: 01/01/21  -CS Time first assessed: 0751  -CS Side: Right  -CS Location: calf  -CS Type: patch  -CS      User Key  (r) = Recorded By, (t) = Taken By, (c) = Cosigned By    Initials Name Provider Type    Lucina Figueroa, RN Registered Nurse    Antoinette Marie LPN Licensed Nurse    Kristine Krishnamurthy RN Registered Nurse    Leatha De Leon RN Registered Nurse          Procedures:              Results Review:     I reviewed the patient's new clinical results.      Lab Results (last 24 hours)     Procedure Component Value Units Date/Time    Comprehensive Metabolic Panel [[id number]]  (Abnormal) Collected: 01/04/21 0514    Specimen: Blood Updated: 01/04/21 0618     Glucose 104 mg/dL      BUN 11 mg/dL      Creatinine 1.49 mg/dL      Sodium 132 mmol/L      Potassium 4.0 mmol/L      Chloride 99 mmol/L      CO2 26.0 mmol/L       Calcium 8.2 mg/dL      Total Protein 5.8 g/dL      Albumin 2.50 g/dL      ALT (SGPT) 17 U/L      AST (SGOT) 22 U/L      Alkaline Phosphatase 122 U/L      Total Bilirubin 0.4 mg/dL      eGFR Non African Amer 38 mL/min/1.73      Globulin 3.3 gm/dL      A/G Ratio 0.8 g/dL      BUN/Creatinine Ratio 7.4     Anion Gap 7.0 mmol/L     Narrative:      GFR Normal >60  Chronic Kidney Disease <60  Kidney Failure <15      Phosphorus [[id number]]  (Normal) Collected: 01/04/21 0514    Specimen: Blood Updated: 01/04/21 0618     Phosphorus 4.3 mg/dL     C-reactive Protein [[id number]]  (Abnormal) Collected: 01/04/21 0514    Specimen: Blood Updated: 01/04/21 0618     C-Reactive Protein 4.44 mg/dL     Magnesium [[id number]]  (Normal) Collected: 01/04/21 0514    Specimen: Blood Updated: 01/04/21 0618     Magnesium 2.1 mg/dL     CK [[id number]]  (Normal) Collected: 01/04/21 0514    Specimen: Blood Updated: 01/04/21 0618     Creatine Kinase 44 U/L     POC Glucose Once [[id number]]  (Abnormal) Collected: 01/04/21 0613    Specimen: Blood Updated: 01/04/21 0615     Glucose 115 mg/dL      Comment: Serial Number: [id number]:  [id number]       Calcium, Ionized [[id number]]  (Abnormal) Collected: 01/04/21 0514    Specimen: Blood Updated: 01/04/21 0600     Ionized Calcium 1.15 mmol/L     CBC & Differential [[id number]]  (Abnormal) Collected: 01/04/21 0514    Specimen: Blood Updated: 01/04/21 0552    Narrative:      The following orders were created for panel order CBC & Differential.  Procedure                               Abnormality         Status                     ---------                               -----------         ------                     CBC Auto Differential[[id number]]        Abnormal            Final result                 Please view results for these tests on the individual orders.    CBC Auto Differential [[id number]]  (Abnormal) Collected: 01/04/21 0514    Specimen: Blood Updated: 01/04/21 0552     WBC 7.50 10*3/mm3       RBC 3.32 10*6/mm3      Hemoglobin 10.0 g/dL      Hematocrit 31.4 %      MCV 94.5 fL      MCH 30.1 pg      MCHC 31.9 g/dL      RDW 16.8 %      RDW-SD 55.6 fl      MPV 8.1 fL      Platelets 261 10*3/mm3      Neutrophil % 66.8 %      Lymphocyte % 21.0 %      Monocyte % 9.8 %      Eosinophil % 1.7 %      Basophil % 0.7 %      Neutrophils, Absolute 5.00 10*3/mm3      Lymphocytes, Absolute 1.60 10*3/mm3      Monocytes, Absolute 0.70 10*3/mm3      Eosinophils, Absolute 0.10 10*3/mm3      Basophils, Absolute 0.10 10*3/mm3      nRBC 0.0 /100 WBC     Blood Culture - Blood, Arm, Right [[id number]] Collected: 01/01/21 0021    Specimen: Blood from Arm, Right Updated: 01/04/21 0030     Blood Culture No growth at 3 days    Blood Culture - Blood, Arm, Right [[id number]] Collected: 12/31/20 2339    Specimen: Blood from Arm, Right Updated: 01/03/21 2345     Blood Culture No growth at 3 days        No results found for: HGBA1C            No results found for: LIPASE  No results found for: CHOL, CHLPL, TRIG, HDL, LDL, LDLDIRECT    No results found for: INTRAOP, PREDX, FINALDX, COMDX    Microbiology Results (last 10 days)     Procedure Component Value - Date/Time    Urine Culture - Urine, Urine, Clean Catch [[id number]]  (Normal) Collected: 01/01/21 1611    Lab Status: Final result Specimen: Urine, Clean Catch Updated: 01/02/21 1537     Urine Culture No growth    Respiratory Panel PCR w/COVID-19(SARS-CoV-2) CARRIE/BARRETT/KANDY/PAD/COR/MAD/MAXIMILIANO In-House, NP Swab in UTM/VTM, 3-4 HR TAT - Swab, Nasopharynx [[id number]]  (Normal) Collected: 01/01/21 0143    Lab Status: Final result Specimen: Swab from Nasopharynx Updated: 01/01/21 0244     ADENOVIRUS, PCR Not Detected     Coronavirus 229E Not Detected     Coronavirus HKU1 Not Detected     Coronavirus NL63 Not Detected     Coronavirus OC43 Not Detected     COVID19 Not Detected     Human Metapneumovirus Not Detected     Human Rhinovirus/Enterovirus Not Detected     Influenza A PCR Not Detected      Influenza B PCR Not Detected     Parainfluenza Virus 1 Not Detected     Parainfluenza Virus 2 Not Detected     Parainfluenza Virus 3 Not Detected     Parainfluenza Virus 4 Not Detected     RSV, PCR Not Detected     Bordetella pertussis pcr Not Detected     Bordetella parapertussis PCR Not Detected     Chlamydophila pneumoniae PCR Not Detected     Mycoplasma pneumo by PCR Not Detected    Narrative:      Fact sheet for providers: https://docs.[employer]/wp-content/uploads/VBK5554-8226-ED5.1-EUA-Provider-Fact-Sheet-3.pdf    Fact sheet for patients: https://docs.[employer]/wp-content/uploads/XLG5079-9903-KQ3.1-EUA-Patient-Fact-Sheet-1.pdf    Test performed by PCR.    Blood Culture - Blood, Arm, Right [[id number]] Collected: 01/01/21 0021    Lab Status: Preliminary result Specimen: Blood from Arm, Right Updated: 01/04/21 0030     Blood Culture No growth at 3 days    Blood Culture - Blood, Arm, Right [[id number]] Collected: 12/31/20 2339    Lab Status: Preliminary result Specimen: Blood from Arm, Right Updated: 01/03/21 2345     Blood Culture No growth at 3 days          ECG/EMG Results (most recent)     Procedure Component Value Units Date/Time    ECG 12 Lead [[id number]] Collected: 12/31/20 2014     Updated: 01/02/21 0823     QT Interval 369 ms     Narrative:      HEART RATE= 106  bpm  RR Interval= 568  ms  CO Interval= 111  ms  P Horizontal Axis= -26  deg  P Front Axis= 53  deg  QRSD Interval= 73  ms  QT Interval= 369  ms  QRS Axis= 73  deg  T Wave Axis= 8  deg  - ABNORMAL ECG -  Sinus tachycardia  Nonspecific T abnormalities, diffuse leads  When compared with ECG of 22-Dec-2020 19:47:41,  No significant change  Electronically Signed By: Merritt Hodgson (KANDY) 02-Jan-2021 08:22:08  Date and Time of Study: 2020-12-31 20:14:42    Adult Transthoracic Echo Limited W/ Cont if Necessary Per Protocol [[id number]] Collected: 01/03/21 0848     Updated: 01/03/21 1314     BSA 2.1 m^2      TR max peyton 364.6 cm/sec      RVSP(TR)  61.2 mmHg      RAP systole 8.0 mmHg       CV ECHO ANGI - BZI_BMI 37.4 kilograms/m^2       CV ECHO ANGI - BSA(HAYCOCK) 2.2 m^2       CV ECHO ANGI - BZI_METRIC_WEIGHT 102.1 kg       CV ECHO ANGI - BZI_METRIC_HEIGHT 165.1 cm      Echo EF Estimated 60 %     Narrative:      · Estimated left ventricular EF = 60% Left ventricular systolic function   is normal.     Indications  Shortness of breath    Technically satisfactory study.  Mitral valve is structurally normal.  Tricuspid valve is structurally normal.  Moderate tricuspid regurgitation.  Aortic valve is structurally normal.  Pulmonic valve could not be well visualized.  No evidence for mitral or aortic regurgitation is seen by Doppler study.  Left atrium is normal in size.  Right atrium is enlarged  Left ventricle is normal in size and contractility with ejection fraction   of 60%.  Right ventricle is enlarged.  Atrial septum is intact.  Aorta is normal.  No pericardial effusion or intracardiac thrombus is seen.    Impression  Moderate tricuspid regurgitation.  Right atrium right ventricle enlargement.  Otherwise structurally and functionally normal cardiac valves.  Left ventricular size and contractility is normal with ejection fraction   of 60%.  No pericardial effusion is present.          Results for orders placed during the hospital encounter of 12/31/20   Duplex Venous Upper Extremity - Left CAR    Narrative · Acute left upper extremity superficial thrombophlebitis noted in the   basilic (upper arm), basilic (forearm), cephalic (upper arm), cephalic   (forearm) and median cubital.  · All other left sided vessels appear normal.          Results for orders placed during the hospital encounter of 12/31/20   Adult Transthoracic Echo Limited W/ Cont if Necessary Per Protocol    Narrative · Estimated left ventricular EF = 60% Left ventricular systolic function   is normal.     Indications  Shortness of breath    Technically satisfactory study.  Mitral valve  is structurally normal.  Tricuspid valve is structurally normal.  Moderate tricuspid regurgitation.  Aortic valve is structurally normal.  Pulmonic valve could not be well visualized.  No evidence for mitral or aortic regurgitation is seen by Doppler study.  Left atrium is normal in size.  Right atrium is enlarged  Left ventricle is normal in size and contractility with ejection fraction   of 60%.  Right ventricle is enlarged.  Atrial septum is intact.  Aorta is normal.  No pericardial effusion or intracardiac thrombus is seen.    Impression  Moderate tricuspid regurgitation.  Right atrium right ventricle enlargement.  Otherwise structurally and functionally normal cardiac valves.  Left ventricular size and contractility is normal with ejection fraction   of 60%.  No pericardial effusion is present.       Xr Chest 1 View    Result Date: 12/31/2020   1. Cardiomegaly. 2. Interval development of right basilar consolidation felt to represent either pneumonia or atelectasis with a small pleural effusion.  Electronically Signed By-Raul Valdes MD On:12/31/2020 6:53 PM This report was finalized on [id number] by  Raul Valdes MD.    Ct Chest Pulmonary Embolism    Result Date: 12/31/2020  1. Large consolidation in the right lower lobe. This likely represents a combination of atelectasis and consolidative right lower lobe pneumonia. There is an additional consolidation in the right middle lobe also likely representing atelectasis and consolidative pneumonia. Continuous radiographic follow-up to complete resolution is recommended. 2. There is a moderately sized right-sided pleural effusion. There is a trace left-sided pleural effusion. There is an element of early pulmonary edema. 3. Subtle reticulonodular type infiltrate in the anterior aspect of the right upper lobe likely representing mild infectious bronchopneumonia. 4. Hepatomegaly with hepatic steatosis. 5. Emphysema. 6. Body wall edema. 7. No definitive pulmonary  embolus. Slot 63 Electronically signed by:  Marvin Martinez M.D.  12/31/2020 8:59 PM          Xrays, labs reviewed personally by physician.    Medication Review:   I have reviewed the patient's current medication list      Scheduled Meds  aspirin, 325 mg, Oral, Daily  bumetanide, 1 mg, Oral, BID  calcium gluconate, 1 g, Intravenous, Q12H  DAPTOmycin, 6 mg/kg (Adjusted), Intravenous, Q24H  Linezolid, 600 mg, Intravenous, Q12H  sodium chloride, 10 mL, Intravenous, Q12H        Meds Infusions       Meds PRN  •  acetaminophen **OR** acetaminophen **OR** acetaminophen  •  diphenhydrAMINE  •  diphenhydrAMINE-zinc acetate  •  ondansetron **OR** ondansetron  •  oxyCODONE  •  sodium chloride        Assessment/Plan   Assessment/Plan     Active Hospital Problems    Diagnosis  POA   • Congestive heart failure (CMS/[facility]) [I50.9]  Yes      Resolved Hospital Problems   No resolved problems to display.       MEDICAL DECISION MAKING COMPLEXITY BY PROBLEM:         Pneumonia, right lower and middle lobe--recent hospitalization x1 week  Respiratory panel including Covid 1/1/2021 -was negative  Blood cultures negative this hospitalization so far  Patient had 2D echo last admission  Consider MYLES  We will ask ID for evaluation  Initial antibiotics with cefepime and Vanco.  Now on Zyvox per ID  Echo 1/3/2020 shows moderate tricuspid regurgitation  Pulmonic valve could not be visualized  EF 60%-Right ventricle is enlarged-No pericardial effusion    Methicillin susceptible Staphylococcus aureus bacteremia and IV drug user.    Most likely secondary to left shoulder infection and left Acromioclavicular joint infection  2D echo showed no signs of vegetation.  Cardio service are comfortable reading the 2D echo alone since it was good imaging study.  -Previous plan Rocephin 2 g IV daily for 6 weeks  -Now switched to daptomycin  -On Zyvox given pneumonia as well per ID.    Left upper extremity edema-  Doppler without DVT.  Superficial  thrombophlebitis noted.   Acute left upper extremity superficial thrombophlebitis noted in the basilic (upper arm), basilic (forearm), cephalic (upper arm), cephalic (forearm) and median cubital. Improved.  Has good pulsations.  Elevation and supportive care recommended  Improving    Acute kidney injury  Could be cardiorenal  Nephrologist following  Could be related to vasculitis as well       Septic left shoulder joint infection on previous admission:  MSSA of the left shoulder with osteomyelitis, stable on outpatient antibiotics:  Antibiotics as above    Suspect right-sided heart failure  Elevated BNP with recent echocardiogram December 2020, showing EF 65% with pulmonary hypertension and moderate tricuspid regurgitation:   Agree with diuresis as tolerated    Rash-  Could be vasculitic  Etiology not clear  Appreciate ID input    Normocytic anemia likely of chronic disease  We will monitor  Anemia panel ordered    Patient is high risk.        VTE Prophylaxis -   Mechanical Order History:     None      Pharmalogical Order History:      Ordered     Dose Route Frequency Stop    12/31/20 2348  enoxaparin (LOVENOX) syringe 40 mg  Status:  Discontinued      40 mg SC Daily 01/01/21 0215    01/01/21 0215  enoxaparin (LOVENOX) syringe 100 mg      1 mg/kg SC Once 01/01/21 0316                  Code Status -   Code Status and Medical Interventions:   Ordered at: 12/31/20 2348     Code Status:    CPR     Medical Interventions (Level of Support Prior to Arrest):    Full       This patient has been examined wearing appropriate Personal Protective Equipment and discussed with hospital infection control department. 01/04/21        Discharge Planning  home        Electronically signed by Magail Singh MD, 01/04/21, 10:07 EST.  [facility] Hospitalist Team

## 2021-01-04 NOTE — PROGRESS NOTES
Continued Stay Note  MATIAS White     Patient Name: Radha Curtis  MRN: [id number]  Today's Date: 1/4/2021    Admit Date: 12/31/2020    Discharge Plan     Row Name 01/04/21 1352       Plan    Plan Comments  SW spoke to pt at bedside (wearing appropriate PPE, 6 FT, 15 MIN). Pt admantly denies IV drug use. Pt states she does not use drugs at all. Pt therefore declines any offer of substance abuse treatment resources. Pt states she does not have a PCP. SW offered [facility]. Pt declines citing [facility] is not a preferred PCP for her personally. SW notified RN LENNY.        Discharge Codes    No documentation.         RAUDEL Rees    Phone # [phone]  Cell #[phone]  Fax#[phone]  [email or web address]        RAUDEL Rees

## 2021-01-04 NOTE — PROGRESS NOTES
Infectious Diseases Progress Note      LOS: 0 days   Patient Care Team:  Danie Dee MD as PCP - General    Chief Complaint: Fatigue and skin rash    Subjective     The patient had no fever during the last 24 hours.  The patient remained hemodynamically stable.  The patient is on room air and states that her rash is feeling better.  She is complaining about left shoulder pain.    Patient is complained to her RN that nobody has discussed any of her medical findings with her.  Made sure to go in and review everything we have gone over this admission, although she keeps claiming that nobody is telling her anything.  Denies discussing her staph infections in her blood in her shoulder she claimed that nobody told her the last admission that she had a staph infection although we discussed this numerous times.  Not sure patient is in denial or she is having difficulty remembering things discussed the day before.    Review of Systems:   Review of Systems   Constitutional: Negative.    HENT: Negative.    Eyes: Negative.    Respiratory: Negative.    Cardiovascular: Positive for leg swelling.   Gastrointestinal: Negative.    Genitourinary: Negative.    Musculoskeletal: Negative.         Left shoulder pain   Skin: Positive for rash.   Neurological: Negative.    Hematological: Negative.    Psychiatric/Behavioral: Negative.         Objective     Vital Signs  Temp:  [98.1 °F (36.7 °C)-98.7 °F (37.1 °C)] 98.1 °F (36.7 °C)  Heart Rate:  [100] 100  Resp:  [15-16] 16  BP: (108-137)/(74) 108/74    Physical Exam:  Physical Exam  Vitals signs and nursing note reviewed.   Constitutional:       Appearance: She is well-developed.   HENT:      Head: Normocephalic and atraumatic.   Eyes:      Pupils: Pupils are equal, round, and reactive to light.   Neck:      Musculoskeletal: Normal range of motion and neck supple.   Cardiovascular:      Rate and Rhythm: Normal rate and regular rhythm.      Heart sounds: Normal heart sounds.   Pulmonary:       Effort: Pulmonary effort is normal. No respiratory distress.      Breath sounds: Normal breath sounds. No wheezing or rales.   Abdominal:      General: Bowel sounds are normal. There is no distension.      Palpations: Abdomen is soft. There is no mass.      Tenderness: There is no abdominal tenderness. There is no guarding or rebound.   Musculoskeletal: Normal range of motion.         General: No deformity.   Skin:     General: Skin is warm.      Findings: Rash present. No erythema.      Comments: Petechial rash mostly involving the posterior calfs  -Resolving slowly   Neurological:      Mental Status: She is alert and oriented to person, place, and time.      Cranial Nerves: No cranial nerve deficit.          Results Review:    I have reviewed all clinical data, test, lab, and imaging results.     Radiology  No Radiology Exams Resulted Within Past 24 Hours    Cardiology    Laboratory    Results from last 7 days   Lab Units 01/04/21  0514 01/03/21  0319 01/02/21  0306 01/01/21  0212 12/31/20 2142 12/29/20  0437   WBC 10*3/mm3 7.50 6.90 7.30 7.10 8.00 9.80   HEMOGLOBIN g/dL 10.0* 10.5* 10.6* 10.2* 11.2* 10.7*   HEMATOCRIT % 31.4* 32.5* 32.1* 31.0* 34.2 32.4*   PLATELETS 10*3/mm3 261 295 267 299 337 267     Results from last 7 days   Lab Units 01/04/21  0514 01/03/21  0319 01/02/21  0306 01/01/21  0212 12/31/20 2142 12/29/20  0437   SODIUM mmol/L 132* 133* 134* 135* 135* 132*   POTASSIUM mmol/L 4.0 3.9 4.0 4.1 4.0 3.6   CHLORIDE mmol/L 99 98 101 101 101 100   CO2 mmol/L 26.0 24.0 24.0 25.0 25.0 23.0   BUN mg/dL 11 12 14 15 16 17   CREATININE mg/dL 1.49* 1.41* 1.43* 1.42* 1.40* 0.79   GLUCOSE mg/dL 104* 119* 95 102* 104* 136*   ALBUMIN g/dL 2.50* 2.50* 2.50*  --  3.00* 2.20*   BILIRUBIN mg/dL 0.4 0.5 0.4  --  0.5 0.4   ALK PHOS U/L 122* 123* 117  --  136* 163*   AST (SGOT) U/L 22 17 25  --  25 27   ALT (SGPT) U/L 17 17 18  --  26 37*   CALCIUM mg/dL 8.2* 8.4* 8.0* 8.1* 8.5* 8.0*     Results from last 7 days    Lab Units 01/04/21  0514   CK TOTAL U/L 44             Microbiology   Microbiology Results (last 10 days)     Procedure Component Value - Date/Time    Urine Culture - Urine, Urine, Clean Catch [[id number]]  (Normal) Collected: 01/01/21 1611    Lab Status: Final result Specimen: Urine, Clean Catch Updated: 01/02/21 1537     Urine Culture No growth    Respiratory Panel PCR w/COVID-19(SARS-CoV-2) [facility] In-House, NP Swab in UTM/VTM, 3-4 HR TAT - Swab, Nasopharynx [[id number]]  (Normal) Collected: 01/01/21 0143    Lab Status: Final result Specimen: Swab from Nasopharynx Updated: 01/01/21 0244     ADENOVIRUS, PCR Not Detected     Coronavirus 229E Not Detected     Coronavirus HKU1 Not Detected     Coronavirus NL63 Not Detected     Coronavirus OC43 Not Detected     COVID19 Not Detected     Human Metapneumovirus Not Detected     Human Rhinovirus/Enterovirus Not Detected     Influenza A PCR Not Detected     Influenza B PCR Not Detected     Parainfluenza Virus 1 Not Detected     Parainfluenza Virus 2 Not Detected     Parainfluenza Virus 3 Not Detected     Parainfluenza Virus 4 Not Detected     RSV, PCR Not Detected     Bordetella pertussis pcr Not Detected     Bordetella parapertussis PCR Not Detected     Chlamydophila pneumoniae PCR Not Detected     Mycoplasma pneumo by PCR Not Detected    Narrative:      Fact sheet for providers: https://docs.[employer]/wp-content/uploads/PJZ5386-2924-EH1.1-EUA-Provider-Fact-Sheet-3.pdf    Fact sheet for patients: https://docs.[employer]/wp-content/uploads/YVE7402-6817-YD1.1-EUA-Patient-Fact-Sheet-1.pdf    Test performed by PCR.    Blood Culture - Blood, Arm, Right [[id number]] Collected: 01/01/21 0021    Lab Status: Preliminary result Specimen: Blood from Arm, Right Updated: 01/04/21 0030     Blood Culture No growth at 3 days    Blood Culture - Blood, Arm, Right [[id number]] Collected: 12/31/20 4251    Lab Status: Preliminary result Specimen: Blood from Arm, Right  Updated: 01/03/21 0857     Blood Culture No growth at 3 days          Medication Review:       Schedule Meds  aspirin, 325 mg, Oral, Daily  bumetanide, 1 mg, Oral, BID  calcium gluconate, 1 g, Intravenous, Q12H  DAPTOmycin, 6 mg/kg (Adjusted), Intravenous, Q24H  Linezolid, 600 mg, Intravenous, Q12H  sodium chloride, 10 mL, Intravenous, Q12H        Infusion Meds       PRN Meds  •  acetaminophen **OR** acetaminophen **OR** acetaminophen  •  calcium carbonate  •  diphenhydrAMINE  •  diphenhydrAMINE-zinc acetate  •  ondansetron **OR** ondansetron  •  oxyCODONE  •  sodium chloride        Assessment/Plan       Antimicrobial Therapy   1.  IV daptomycin      day  2.  Zyvox      day  3.      Day  4.      Day  5.      Day      Assessment    Recent admission for methicillin susceptible Staphylococcus aureus bacteremia and IV drug user found during last admission.  Most likely secondary to left shoulder infection and left Acromioclavicular joint infection  2D echo showed no signs of vegetation from last admission.  Cardio service are comfortable reading the 2D echo alone since it was good imaging study.  -Patient was discharged on 12/29/2020 and was receiving IV Rocephin at the ambulatory care clinic  -Patient's 2D echo showed moderate tricuspid valve regurgitation which is concerning for possible infection     Patient came in on 12/31/2020 with complaints of extremity swelling especially in the left extremity   -Doppler of the left extremity did not show DVT but had multiple SVTs  -Surgical incisions appear to be healing well with no obvious signs of infection     Purpuric rash on bilateral lower legs that patient complains are itching and burning  -Started 12/31/2020 after patient received IV Rocephin in ambulatory care clinic  -Likely reaction to antibiotics/ceftriaxone  -Resolving slowly     Left septic shoulder/Acromioclavicular joint infection with abscess in the trapezius musculature found on last admission  The patient  is s/p washout of the left shoulder and Acromioclavicular joint debridement with drainage of trapezius muscle abscess on December 24, 2020.  Intraoperative cultures are growing methicillin susceptible Staphylococcus aureus     IV drug user  -Drug screen from last admission was positive for methamphetamines and opiates  -Hepatitis B and C and HIV screens were negative     Positive urine culture for methicillin susceptible Staph aureus found during last admission.  This is descending infection as a result of bacteremia     New finding this admission of right lower and middle lobe consolidation with right pleural effusion  -Patient denies shortness of breath and is currently on room air          Plan     Continue IV daptomycin 6 mg/kg every 24 hours for 33 days to finish up 6 weeks of treatment for MSSA bacteremia and joint infection  Continue IV Zyvox 600 mg every 12 hours for 2 weeks-can be switched to p.o. at discharge  Consult cardiology for MYLES  Continue supportive care  A.m. labs including CPK  Tobacco abuse cessation  Illicit drug abuse cessation     Patient has complained to her RN that nobody has discussed any of her medical findings with her.  Made sure to go in and review everything we have gone over this admission, although she keeps claiming that nobody is telling her anything.  Denies discussing her staph infections in her blood in her shoulder- she claims that nobody told her the last admission that she had a staph infection although we discussed this numerous times.  Not sure patient is in denial or she is having difficulty remembering things discussed previously.        Rossana Wilder, APRN  01/04/21  18:19 EST      Note is dictated utilizing voice recognition software/Dragon

## 2021-01-05 ENCOUNTER — APPOINTMENT (OUTPATIENT)
Dept: INFUSION THERAPY | Facility: HOSPITAL | Age: 46
End: 2021-01-05

## 2021-01-05 LAB
ANION GAP SERPL CALCULATED.3IONS-SCNC: 9 MMOL/L (ref 5–15)
BACTERIA SPEC AEROBE CULT: NORMAL
BUN SERPL-MCNC: 10 MG/DL (ref 6–20)
BUN/CREAT SERPL: 6 (ref 7–25)
CALCIUM SPEC-SCNC: 8.5 MG/DL (ref 8.6–10.5)
CHLORIDE SERPL-SCNC: 98 MMOL/L (ref 98–107)
CK SERPL-CCNC: 45 U/L (ref 20–180)
CO2 SERPL-SCNC: 27 MMOL/L (ref 22–29)
CREAT SERPL-MCNC: 1.66 MG/DL (ref 0.57–1)
CRP SERPL-MCNC: 3.81 MG/DL (ref 0–0.5)
DEPRECATED RDW RBC AUTO: 54.7 FL (ref 37–54)
ERYTHROCYTE [DISTWIDTH] IN BLOOD BY AUTOMATED COUNT: 16.7 % (ref 12.3–15.4)
GFR SERPL CREATININE-BSD FRML MDRD: 34 ML/MIN/1.73
GLUCOSE SERPL-MCNC: 127 MG/DL (ref 65–99)
HCT VFR BLD AUTO: 31.4 % (ref 34–46.6)
HGB BLD-MCNC: 10.2 G/DL (ref 12–15.9)
MAGNESIUM SERPL-MCNC: 1.9 MG/DL (ref 1.6–2.6)
MCH RBC QN AUTO: 30.4 PG (ref 26.6–33)
MCHC RBC AUTO-ENTMCNC: 32.4 G/DL (ref 31.5–35.7)
MCV RBC AUTO: 93.9 FL (ref 79–97)
PHOSPHATE SERPL-MCNC: 3.9 MG/DL (ref 2.5–4.5)
PLATELET # BLD AUTO: 256 10*3/MM3 (ref 140–450)
PMV BLD AUTO: 7.8 FL (ref 6–12)
POTASSIUM SERPL-SCNC: 4.1 MMOL/L (ref 3.5–5.2)
RBC # BLD AUTO: 3.35 10*6/MM3 (ref 3.77–5.28)
SODIUM SERPL-SCNC: 134 MMOL/L (ref 136–145)
WBC # BLD AUTO: 6.5 10*3/MM3 (ref 3.4–10.8)

## 2021-01-05 PROCEDURE — 25010000002 ONDANSETRON PER 1 MG: Performed by: NURSE PRACTITIONER

## 2021-01-05 PROCEDURE — 25010000002 LINEZOLID 600 MG/300ML SOLUTION: Performed by: NURSE PRACTITIONER

## 2021-01-05 PROCEDURE — 99222 1ST HOSP IP/OBS MODERATE 55: CPT | Performed by: INTERNAL MEDICINE

## 2021-01-05 PROCEDURE — 85027 COMPLETE CBC AUTOMATED: CPT | Performed by: INTERNAL MEDICINE

## 2021-01-05 PROCEDURE — 80048 BASIC METABOLIC PNL TOTAL CA: CPT | Performed by: INTERNAL MEDICINE

## 2021-01-05 PROCEDURE — 83735 ASSAY OF MAGNESIUM: CPT | Performed by: INTERNAL MEDICINE

## 2021-01-05 PROCEDURE — 99232 SBSQ HOSP IP/OBS MODERATE 35: CPT | Performed by: HOSPITALIST

## 2021-01-05 PROCEDURE — 82550 ASSAY OF CK (CPK): CPT | Performed by: NURSE PRACTITIONER

## 2021-01-05 PROCEDURE — 86140 C-REACTIVE PROTEIN: CPT | Performed by: INTERNAL MEDICINE

## 2021-01-05 PROCEDURE — 84100 ASSAY OF PHOSPHORUS: CPT | Performed by: INTERNAL MEDICINE

## 2021-01-05 PROCEDURE — 25010000002 DAPTOMYCIN PER 1 MG: Performed by: NURSE PRACTITIONER

## 2021-01-05 RX ORDER — NICOTINE 21 MG/24HR
1 PATCH, TRANSDERMAL 24 HOURS TRANSDERMAL NIGHTLY
Status: DISCONTINUED | OUTPATIENT
Start: 2021-01-05 | End: 2021-01-06 | Stop reason: HOSPADM

## 2021-01-05 RX ADMIN — OXYCODONE 5 MG: 5 TABLET ORAL at 03:15

## 2021-01-05 RX ADMIN — BUMETANIDE 1 MG: 1 TABLET ORAL at 20:21

## 2021-01-05 RX ADMIN — OXYCODONE 5 MG: 5 TABLET ORAL at 07:51

## 2021-01-05 RX ADMIN — Medication 10 ML: at 09:04

## 2021-01-05 RX ADMIN — DAPTOMYCIN 450 MG: 500 INJECTION, POWDER, LYOPHILIZED, FOR SOLUTION INTRAVENOUS at 17:07

## 2021-01-05 RX ADMIN — LINEZOLID 600 MG: 600 INJECTION, SOLUTION INTRAVENOUS at 05:06

## 2021-01-05 RX ADMIN — OXYCODONE 5 MG: 5 TABLET ORAL at 17:06

## 2021-01-05 RX ADMIN — OXYCODONE 5 MG: 5 TABLET ORAL at 12:32

## 2021-01-05 RX ADMIN — Medication 1 PATCH: at 20:20

## 2021-01-05 RX ADMIN — ONDANSETRON 4 MG: 2 INJECTION, SOLUTION INTRAMUSCULAR; INTRAVENOUS at 22:51

## 2021-01-05 RX ADMIN — Medication 10 ML: at 20:20

## 2021-01-05 RX ADMIN — BUMETANIDE 1 MG: 1 TABLET ORAL at 09:04

## 2021-01-05 RX ADMIN — LINEZOLID 600 MG: 600 INJECTION, SOLUTION INTRAVENOUS at 17:51

## 2021-01-05 NOTE — PLAN OF CARE
Goal Outcome Evaluation:  Plan of Care Reviewed With: patient  Progress: no change   Patient continues to have chronic pain, pain medications administered prn, pt refused to ambulate halls to help alleviate some of the aches she was complaining of from laying in the bed.  Hot pack offered to shoulder will continue to monitor

## 2021-01-05 NOTE — PROGRESS NOTES
Infectious Diseases Progress Note      LOS: 1 day   Patient Care Team:  Danie Dee MD as PCP - General    Chief Complaint: Fatigue and skin rash    Subjective     The patient had no fever during the last 24 hours.  The patient remained hemodynamically stable.  The patient is on room air and states that her rash is feeling better.  She is complaining about left shoulder pain.      Review of Systems:   Review of Systems   Constitutional: Negative.    HENT: Negative.    Eyes: Negative.    Respiratory: Negative.    Cardiovascular: Positive for leg swelling.   Gastrointestinal: Negative.    Genitourinary: Negative.    Musculoskeletal: Negative.         Left shoulder pain   Skin: Positive for rash.   Neurological: Negative.    Hematological: Negative.    Psychiatric/Behavioral: Negative.         Objective     Vital Signs  Temp:  [97.6 °F (36.4 °C)-98.3 °F (36.8 °C)] 98.3 °F (36.8 °C)  Heart Rate:  [96-99] 97  Resp:  [12-14] 14  BP: (117-127)/(70-73) 127/70    Physical Exam:  Physical Exam  Vitals signs and nursing note reviewed.   Constitutional:       Appearance: She is well-developed.   HENT:      Head: Normocephalic and atraumatic.   Eyes:      Pupils: Pupils are equal, round, and reactive to light.   Neck:      Musculoskeletal: Normal range of motion and neck supple.   Cardiovascular:      Rate and Rhythm: Normal rate and regular rhythm.      Heart sounds: Normal heart sounds.   Pulmonary:      Effort: Pulmonary effort is normal. No respiratory distress.      Breath sounds: Normal breath sounds. No wheezing or rales.   Abdominal:      General: Bowel sounds are normal. There is no distension.      Palpations: Abdomen is soft. There is no mass.      Tenderness: There is no abdominal tenderness. There is no guarding or rebound.   Musculoskeletal: Normal range of motion.         General: No deformity.   Skin:     General: Skin is warm.      Findings: Rash present. No erythema.      Comments: Petechial rash of the  lower extremities almost resolved   Neurological:      Mental Status: She is alert and oriented to person, place, and time.      Cranial Nerves: No cranial nerve deficit.          Results Review:    I have reviewed all clinical data, test, lab, and imaging results.     Radiology  No Radiology Exams Resulted Within Past 24 Hours    Cardiology    Laboratory    Results from last 7 days   Lab Units 01/05/21 0437 01/04/21 0514 01/03/21 0319 01/02/21 0306 01/01/21 0212 12/31/20  2142   WBC 10*3/mm3 6.50 7.50 6.90 7.30 7.10 8.00   HEMOGLOBIN g/dL 10.2* 10.0* 10.5* 10.6* 10.2* 11.2*   HEMATOCRIT % 31.4* 31.4* 32.5* 32.1* 31.0* 34.2   PLATELETS 10*3/mm3 256 261 295 267 299 337     Results from last 7 days   Lab Units 01/05/21 0437 01/04/21 0514 01/03/21 0319 01/02/21 0306 01/01/21 0212 12/31/20  2142   SODIUM mmol/L 134* 132* 133* 134* 135* 135*   POTASSIUM mmol/L 4.1 4.0 3.9 4.0 4.1 4.0   CHLORIDE mmol/L 98 99 98 101 101 101   CO2 mmol/L 27.0 26.0 24.0 24.0 25.0 25.0   BUN mg/dL 10 11 12 14 15 16   CREATININE mg/dL 1.66* 1.49* 1.41* 1.43* 1.42* 1.40*   GLUCOSE mg/dL 127* 104* 119* 95 102* 104*   ALBUMIN g/dL  --  2.50* 2.50* 2.50*  --  3.00*   BILIRUBIN mg/dL  --  0.4 0.5 0.4  --  0.5   ALK PHOS U/L  --  122* 123* 117  --  136*   AST (SGOT) U/L  --  22 17 25  --  25   ALT (SGPT) U/L  --  17 17 18  --  26   CALCIUM mg/dL 8.5* 8.2* 8.4* 8.0* 8.1* 8.5*     Results from last 7 days   Lab Units 01/05/21  0437   CK TOTAL U/L 45             Microbiology   Microbiology Results (last 10 days)     Procedure Component Value - Date/Time    Urine Culture - Urine, Urine, Clean Catch [[id number]]  (Normal) Collected: 01/01/21 1611    Lab Status: Final result Specimen: Urine, Clean Catch Updated: 01/02/21 1537     Urine Culture No growth    Respiratory Panel PCR w/COVID-19(SARS-CoV-2) [facility] In-House, NP Swab in UTM/VTM, 3-4 HR TAT - Swab, Nasopharynx [[id number]]  (Normal) Collected: 01/01/21 0143    Lab  Status: Final result Specimen: Swab from Nasopharynx Updated: 01/01/21 0244     ADENOVIRUS, PCR Not Detected     Coronavirus 229E Not Detected     Coronavirus HKU1 Not Detected     Coronavirus NL63 Not Detected     Coronavirus OC43 Not Detected     COVID19 Not Detected     Human Metapneumovirus Not Detected     Human Rhinovirus/Enterovirus Not Detected     Influenza A PCR Not Detected     Influenza B PCR Not Detected     Parainfluenza Virus 1 Not Detected     Parainfluenza Virus 2 Not Detected     Parainfluenza Virus 3 Not Detected     Parainfluenza Virus 4 Not Detected     RSV, PCR Not Detected     Bordetella pertussis pcr Not Detected     Bordetella parapertussis PCR Not Detected     Chlamydophila pneumoniae PCR Not Detected     Mycoplasma pneumo by PCR Not Detected    Narrative:      Fact sheet for providers: https://docs.[employer]/wp-content/uploads/LEJ0994-7485-AB3.1-EUA-Provider-Fact-Sheet-3.pdf    Fact sheet for patients: https://docs.[employer]/wp-content/uploads/YPX3853-5065-EA4.1-EUA-Patient-Fact-Sheet-1.pdf    Test performed by PCR.    Blood Culture - Blood, Arm, Right [[id number]] Collected: 01/01/21 0021    Lab Status: Preliminary result Specimen: Blood from Arm, Right Updated: 01/05/21 0030     Blood Culture No growth at 4 days    Blood Culture - Blood, Arm, Right [[id number]] Collected: 12/31/20 2339    Lab Status: Preliminary result Specimen: Blood from Arm, Right Updated: 01/04/21 2345     Blood Culture No growth at 4 days          Medication Review:       Schedule Meds  aspirin, 325 mg, Oral, Daily  bumetanide, 1 mg, Oral, BID  DAPTOmycin, 6 mg/kg (Adjusted), Intravenous, Q24H  Linezolid, 600 mg, Intravenous, Q12H  sodium chloride, 10 mL, Intravenous, Q12H        Infusion Meds       PRN Meds  •  acetaminophen **OR** acetaminophen **OR** acetaminophen  •  calcium carbonate  •  diphenhydrAMINE  •  diphenhydrAMINE-zinc acetate  •  ondansetron **OR** ondansetron  •  oxyCODONE  •  sodium  chloride        Assessment/Plan       Antimicrobial Therapy   1.  IV daptomycin      day  2.  Zyvox      day  3.      Day  4.      Day  5.      Day      Assessment    Recent admission for methicillin susceptible Staphylococcus aureus bacteremia and IV drug user found during last admission.  Most likely secondary to left shoulder infection and left Acromioclavicular joint infection  2D echo showed no signs of vegetation from last admission.  Cardio service are comfortable reading the 2D echo alone since it was good imaging study.  -Patient was discharged on 12/29/2020 and was receiving IV Rocephin at the ambulatory care clinic  -Patient's 2D echo showed moderate tricuspid valve regurgitation      Patient came in on 12/31/2020 with complaints of extremity swelling especially in the left extremity   -Doppler of the left extremity did not show DVT but had multiple SVTs  -Surgical incisions appear to be healing well with no obvious signs of infection     Purpuric rash on bilateral lower legs that patient complains are itching and burning  -Started 12/31/2020 after patient received IV Rocephin in ambulatory care clinic  -Likely reaction to antibiotics/ceftriaxone  -Improved significantly     Left septic shoulder/Acromioclavicular joint infection with abscess in the trapezius musculature found on last admission  The patient is s/p washout of the left shoulder and Acromioclavicular joint debridement with drainage of trapezius muscle abscess on December 24, 2020.  Intraoperative cultures are growing methicillin susceptible Staphylococcus aureus     IV drug user  -Drug screen from last admission was positive for methamphetamines and opiates  -Hepatitis B and C and HIV screens were negative     Positive urine culture for methicillin susceptible Staph aureus found during last admission.  This is descending infection as a result of bacteremia     New finding this admission of right lower and middle lobe consolidation with right  pleural effusion  -Patient denies shortness of breath and is currently on room air          Plan     Continue IV daptomycin 6 mg/kg every 24 hours for 33 days to finish up 6 weeks of treatment for MSSA bacteremia and joint infection  Continue IV Zyvox 600 mg every 12 hours for 2 weeks-can be switched to p.o. at discharge  Transesophageal echo is scheduled for tomorrow morning  Continue supportive care  A.m. labs including CPK  Tobacco abuse cessation  Illicit drug abuse cessation    Case was discussed with the patient at the bedside in details.  Nursing staff was present during  that time        Asaf Pruitt MD  01/05/21  13:51 EST      Note is dictated utilizing voice recognition software/Dragon

## 2021-01-05 NOTE — CONSULTS
Referring Provider: Dr. Singh  Reason for Consultation: Acute diastolic CHF  Pneumonia  Bacteremia with septic arthritis  Tricuspid insufficiency    Patient Care Team:  Danie Dee MD as PCP - General    Chief complaint swelling and shortness of breath        History of present illness:  Radha Curtis is a 45 y.o. female who presents with shortness of breath and swelling.   45-year-old female patient recently admitted hospital with septic arthritis history of drug use now readmitted with increasing swelling of lower extremities and also left upper extremity shortness of breath  Patient denies of any further IV drug abuse  So I was asked to see the patient due to the new congestive heart failure and possible vegetations on the tricuspid valve  She is currently antibiotics that should cover possible endocarditis    Review of Systems   Constitution: Positive for malaise/fatigue. Negative for fever.   HENT: Negative for congestion and hearing loss.    Eyes: Negative for double vision and visual disturbance.   Cardiovascular: Positive for leg swelling. Negative for chest pain, claudication, dyspnea on exertion and syncope.   Respiratory: Positive for shortness of breath. Negative for cough.    Endocrine: Negative for cold intolerance.   Skin: Negative for color change and rash.   Musculoskeletal: Negative for arthritis and joint pain.   Gastrointestinal: Negative for abdominal pain and heartburn.   Genitourinary: Negative for hematuria.   Neurological: Negative for excessive daytime sleepiness and dizziness.   Psychiatric/Behavioral: Negative for depression. The patient is not nervous/anxious.    All other systems reviewed and are negative.      History  Past Medical History:   Diagnosis Date   • Allergic rhinitis 10/1/2015   • Anxiety 12/22/2020   • Cervical radiculopathy 12/22/2020   • Chronic low back pain 5/11/2015   • Depression 12/22/2020   • Drug abuse, IV (CMS/[facility]) 12/22/2020   • Hidradenitis  suppurativa 2013   • Hyperlipidemia 2015   • Hypertension 2020   • Insomnia due to mental disorder 10/17/2014   • Restless leg syndrome 2015   • Tobacco use 2015   • Type 2 diabetes mellitus without complications (CMS/[facility]) 2020       Past Surgical History:   Procedure Laterality Date   • ARM DEBRIDEMENT Left 2020    Procedure: Arthrotomy of left shoulder with irrigation and decompression of trapezial abscess with irrigation and debridement;  Surgeon: Clayton Bergeron MD;  Location: [facility] MAIN OR;  Service: Orthopedics;  Laterality: Left;   • SHOULDER ACROMIOCLAVICULAR JOINT REPAIR Left 2020    Procedure: SHOULDER ACROMIOCLAVICULAR JOINT RESECTION;  Surgeon: Clayton Bergeron MD;  Location: [facility] MAIN OR;  Service: Orthopedics;  Laterality: Left;       Family History   Problem Relation Age of Onset   • Diabetes Mother    • Heart disease Mother    • COPD Father        Social History     Tobacco Use   • Smoking status: Current Every Day Smoker     Packs/day: 0.50     Types: Cigarettes   Substance Use Topics   • Alcohol use: Not Currently   • Drug use: Yes     Types: IV, Methamphetamines        Medications Prior to Admission   Medication Sig Dispense Refill Last Dose   • cefTRIAXone 2 g in sodium chloride 0.9 % 100 mL IVPB Infuse 2 g into a venous catheter Daily for 36 doses. Indications: Infection with Dysregulated Inflammatory Response      • [] miconazole (MICOTIN) 2 % vaginal cream Insert 1 applicator into the vagina Every Night for 6 doses. 6 g 0          Rocephin [ceftriaxone] and Penicillins    Scheduled Meds:aspirin, 325 mg, Oral, Daily  bumetanide, 1 mg, Oral, BID  DAPTOmycin, 6 mg/kg (Adjusted), Intravenous, Q24H  Linezolid, 600 mg, Intravenous, Q12H  sodium chloride, 10 mL, Intravenous, Q12H      Continuous Infusions:   PRN Meds:.•  acetaminophen **OR** acetaminophen **OR** acetaminophen  •  calcium carbonate  •  diphenhydrAMINE  •   "diphenhydrAMINE-zinc acetate  •  ondansetron **OR** ondansetron  •  oxyCODONE  •  sodium chloride        VITAL SIGNS  Vitals:    01/03/21 2242 01/04/21 1334 01/04/21 2251 01/05/21 0507   BP: 137/74 108/74  117/73   BP Location: Right arm Right arm  Right arm   Patient Position: Lying Sitting  Lying   Pulse:  100 96 99   Resp: 15 16 13 12   Temp: 98.7 °F (37.1 °C) 98.1 °F (36.7 °C) 98 °F (36.7 °C) 97.6 °F (36.4 °C)   TempSrc: Oral Oral Oral Oral   SpO2: 94% 90% 94% 94%   Weight:       Height:           Flowsheet Rows      First Filed Value   Admission Height  165.1 cm (65\") Documented at 12/31/2020 1712   Admission Weight  101 kg (222 lb 14.2 oz) Documented at 12/31/2020 1712           TELEMETRY: Sinus rhythm    Physical Exam:  Constitutional:       Appearance: Well-developed.   Eyes:      General: No scleral icterus.     Conjunctiva/sclera: Conjunctivae normal.   HENT:      Head: Normocephalic and atraumatic.    Mouth/Throat:      Mouth: No oral lesions.      Pharynx: Uvula midline.   Neck:      Musculoskeletal: Neck supple.      Thyroid: No thyromegaly.      Vascular: No carotid bruit or JVD.      Trachea: Trachea normal.   Pulmonary:      Effort: Pulmonary effort is normal.      Breath sounds: Normal breath sounds.   Cardiovascular:      Normal rate. Regular rhythm.      Murmurs: There is a systolic murmur.      No gallop.   Pulses:     Intact distal pulses.   Edema:     Pretibial: bilateral 1+ edema of the pretibial area.     Ankle: bilateral 1+ edema of the ankle.  Abdominal:      General: Bowel sounds are normal.      Palpations: Abdomen is soft.   Musculoskeletal: Normal range of motion.   Skin:     General: Skin is warm. There is no cyanosis.   Neurological:      Mental Status: Alert and oriented to person, place, and time.      Comments: No focal deficits   Psychiatric:         Behavior: Behavior is cooperative.                  LAB RESULTS (LAST 7 DAYS)    CBC  Results from last 7 days   Lab Units " 01/05/21 0437 01/04/21 0514 01/03/21 0319 01/02/21 0306 01/01/21 0212 12/31/20  2142   WBC 10*3/mm3 6.50 7.50 6.90 7.30 7.10 8.00   RBC 10*6/mm3 3.35* 3.32* 3.43* 3.38* 3.32* 3.65*   HEMOGLOBIN g/dL 10.2* 10.0* 10.5* 10.6* 10.2* 11.2*   HEMATOCRIT % 31.4* 31.4* 32.5* 32.1* 31.0* 34.2   MCV fL 93.9 94.5 94.6 95.0 93.4 93.7   PLATELETS 10*3/mm3 256 261 295 267 299 337       BMP  Results from last 7 days   Lab Units 01/05/21 0437 01/04/21 0514 01/03/21 0319 01/02/21 0306 01/01/21 0212 12/31/20  2142   SODIUM mmol/L 134* 132* 133* 134* 135* 135*   POTASSIUM mmol/L 4.1 4.0 3.9 4.0 4.1 4.0   CHLORIDE mmol/L 98 99 98 101 101 101   CO2 mmol/L 27.0 26.0 24.0 24.0 25.0 25.0   BUN mg/dL 10 11 12 14 15 16   CREATININE mg/dL 1.66* 1.49* 1.41* 1.43* 1.42* 1.40*   GLUCOSE mg/dL 127* 104* 119* 95 102* 104*   MAGNESIUM mg/dL 1.9 2.1 1.7  --   --   --    PHOSPHORUS mg/dL 3.9 4.3 4.7*  --   --   --        CMP   Results from last 7 days   Lab Units 01/05/21 0437 01/04/21 0514 01/03/21 0319 01/02/21 0306 01/01/21 0212 12/31/20  2142   SODIUM mmol/L 134* 132* 133* 134* 135* 135*   POTASSIUM mmol/L 4.1 4.0 3.9 4.0 4.1 4.0   CHLORIDE mmol/L 98 99 98 101 101 101   CO2 mmol/L 27.0 26.0 24.0 24.0 25.0 25.0   BUN mg/dL 10 11 12 14 15 16   CREATININE mg/dL 1.66* 1.49* 1.41* 1.43* 1.42* 1.40*   GLUCOSE mg/dL 127* 104* 119* 95 102* 104*   ALBUMIN g/dL  --  2.50* 2.50* 2.50*  --  3.00*   BILIRUBIN mg/dL  --  0.4 0.5 0.4  --  0.5   ALK PHOS U/L  --  122* 123* 117  --  136*   AST (SGOT) U/L  --  22 17 25  --  25   ALT (SGPT) U/L  --  17 17 18  --  26         BNP        TROPONIN  Results from last 7 days   Lab Units 01/05/21  0437  01/01/21  0212   CK TOTAL U/L 45   < >  --    TROPONIN T ng/mL  --   --  <0.010    < > = values in this interval not displayed.       CoAg        Creatinine Clearance  Estimated Creatinine Clearance: 51.2 mL/min (A) (by C-G formula based on SCr of 1.66 mg/dL (H)).    ABG        Radiology  No radiology  results for the last day        EKG          I personally viewed and interpreted the patient's EKG/Telemetry data:    ECHOCARDIOGRAM:    Results for orders placed during the hospital encounter of 12/31/20   Adult Transthoracic Echo Limited W/ Cont if Necessary Per Protocol    Narrative · Estimated left ventricular EF = 60% Left ventricular systolic function   is normal.     Indications  Shortness of breath    Technically satisfactory study.  Mitral valve is structurally normal.  Tricuspid valve is structurally normal.  Moderate tricuspid regurgitation.  Aortic valve is structurally normal.  Pulmonic valve could not be well visualized.  No evidence for mitral or aortic regurgitation is seen by Doppler study.  Left atrium is normal in size.  Right atrium is enlarged  Left ventricle is normal in size and contractility with ejection fraction   of 60%.  Right ventricle is enlarged.  Atrial septum is intact.  Aorta is normal.  No pericardial effusion or intracardiac thrombus is seen.    Impression  Moderate tricuspid regurgitation.  Right atrium right ventricle enlargement.  Otherwise structurally and functionally normal cardiac valves.  Left ventricular size and contractility is normal with ejection fraction   of 60%.  No pericardial effusion is present.       STRESS MYOVIEW:    CARDIAC CATHETERIZATION:    OTHER:         Assessment/Plan       Congestive heart failure (CMS/HCC)      New onset congestive heart failure  Most probably due to the RV dysfunction and tricuspid insufficiency  History of drug abuse recent septic arthritis on appropriate antibiotics per infectious disease team  I discussed with the infectious disease  If patient needs MYLES is mainly to evaluate the tricuspid valve possible vegetation and evaluate the RV size and function to get the accurate diagnosis regarding recent failure  All the risks benefits alternate options discussed  Risks of death esophageal perforation      Rikki Hammond,  MD  01/05/21  09:13 EST

## 2021-01-05 NOTE — PROGRESS NOTES
"Heart Failure Program  Nurse Navigator  Discharge Planning    Patient Name:Radha Curtis  :1975  Cardiologist:   Current Admission Date: 2020   Previous Admission:  2020  Admission frequency: 2  admissions in 6 months    Heart Failure history per record:    Symptoms on admission: Edema to LE and left hand   Was recently hospitalized for shoulder joint infection was sent to ambulatory for IV antibiotics as an out patient when there was found to have edema to legs and was admitted          Admission Weight:  Flowsheet Rows      First Filed Value   Admission Height  165.1 cm (65\") Documented at 2020 1712   Admission Weight  101 kg (222 lb 14.2 oz) Documented at 2020 1712            Current Home Medications:  Prior to Admission medications    Medication Sig Start Date End Date Taking? Authorizing Provider   cefTRIAXone 2 g in sodium chloride 0.9 % 100 mL IVPB Infuse 2 g into a venous catheter Daily for 36 doses. Indications: Infection with Dysregulated Inflammatory Response 12/29/20 2/3/21 Yes Kai Mendoza DO   miconazole (MICOTIN) 2 % vaginal cream Insert 1 applicator into the vagina Every Night for 6 doses. 20  Kai Mendoza DO       Social history:   Patient lives with her , reports no knowledge of HF      Smoking status: Current / PPD     Diagnostics Testing:  proBNP level:      Echocardiogram:  Results for orders placed during the hospital encounter of 20   Adult Transthoracic Echo Limited W/ Cont if Necessary Per Protocol    Narrative · Estimated left ventricular EF = 60% Left ventricular systolic function   is normal.     Indications  Shortness of breath    Technically satisfactory study.  Mitral valve is structurally normal.  Tricuspid valve is structurally normal.  Moderate tricuspid regurgitation.  Aortic valve is structurally normal.  Pulmonic valve could not be well visualized.  No evidence for mitral or aortic " regurgitation is seen by Doppler study.  Left atrium is normal in size.  Right atrium is enlarged  Left ventricle is normal in size and contractility with ejection fraction   of 60%.  Right ventricle is enlarged.  Atrial septum is intact.  Aorta is normal.  No pericardial effusion or intracardiac thrombus is seen.    Impression  Moderate tricuspid regurgitation.  Right atrium right ventricle enlargement.  Otherwise structurally and functionally normal cardiac valves.  Left ventricular size and contractility is normal with ejection fraction   of 60%.  No pericardial effusion is present.         Patient Assessment:    Patient is in bed she is very upset with still being in the hospital and reports that she doesn't know what's going on     Current O2:  RA   Home O2:     Education provided to patient:  yes- Heart Failure disease education  yes -Symptom identification/management  yes -Daily Weights  yes- Diet education  yes- Fluid restriction (if ordered)  yes- Activity education  yes- Medication education  yes- Smoking cessation  yes- Follow-up Appointments  yes-Provided information on how to access [facility] My HF Guide/Heart Failure Interactive workbook    Acceptance of learning: Patient was not willing to accept     Heart Failure education interactive teaching session time: 30 minutes    Identified needs/barriers:   Patients understanding of disease process     Intervention:

## 2021-01-05 NOTE — PROGRESS NOTES
"  RENAL PROGRESS NOTE      Patient Care Team:  Danie Dee MD as PCP - General      Provider:  Maicol nAderson MD  Patient Name: Radha Curtis  :  1975    SUBJECTIVE:    F/U CHF/PELON    Some nausea post abx. No SOB, CP, dysuria    Medication:  aspirin, 325 mg, Oral, Daily  bumetanide, 1 mg, Oral, BID  DAPTOmycin, 6 mg/kg (Adjusted), Intravenous, Q24H  Linezolid, 600 mg, Intravenous, Q12H  sodium chloride, 10 mL, Intravenous, Q12H           OBJECTIVE    Vital Sign Min/Max for last 24 hours  Temp  Min: 97.6 °F (36.4 °C)  Max: 98.1 °F (36.7 °C)   BP  Min: 108/74  Max: 117/73   Pulse  Min: 96  Max: 100   Resp  Min: 12  Max: 16   SpO2  Min: 90 %  Max: 94 %   No data recorded   No data recorded     Flowsheet Rows      First Filed Value   Admission Height  165.1 cm (65\") Documented at 2020 171   Admission Weight  101 kg (222 lb 14.2 oz) Documented at 2020 1712          No intake/output data recorded.  I/O last 3 completed shifts:  In: 1080 [P.O.:480; I.V.:400; IV Piggyback:200]  Out: 300 [Urine:300]    Physical Exam:  General Appearance: alert, appears stated age and cooperative  Head: normocephalic, without obvious abnormality and atraumatic  Eyes: conjunctivae and sclerae normal and no icterus  Neck: supple and no JVD  Lungs: clear to auscultation and respirations regular  Heart: regular rhythm & normal rate and normal S1, S2 +ERENDIRA  Chest: Wall no abnormalities observed  Abdomen: normal bowel sounds and soft non-tender +OBESITY  Extremities: moves extremities well, no edema, no cyanosis and no redness  Skin: no bleeding, bruising or rash, turgor normal, color normal and no lesions noted  Neurologic: Alert, and oriented. No focal deficits    Labs:    WBC WBC   Date Value Ref Range Status   2021 6.50 3.40 - 10.80 10*3/mm3 Final   2021 7.50 3.40 - 10.80 10*3/mm3 Final   2021 6.90 3.40 - 10.80 10*3/mm3 Final      HGB Hemoglobin   Date Value Ref Range Status   2021 10.2 (L) 12.0 " - 15.9 g/dL Final   01/04/2021 10.0 (L) 12.0 - 15.9 g/dL Final   01/03/2021 10.5 (L) 12.0 - 15.9 g/dL Final      HCT Hematocrit   Date Value Ref Range Status   01/05/2021 31.4 (L) 34.0 - 46.6 % Final   01/04/2021 31.4 (L) 34.0 - 46.6 % Final   01/03/2021 32.5 (L) 34.0 - 46.6 % Final      Platlets No results found for: LABPLAT   MCV MCV   Date Value Ref Range Status   01/05/2021 93.9 79.0 - 97.0 fL Final   01/04/2021 94.5 79.0 - 97.0 fL Final   01/03/2021 94.6 79.0 - 97.0 fL Final          Sodium Sodium   Date Value Ref Range Status   01/05/2021 134 (L) 136 - 145 mmol/L Final   01/04/2021 132 (L) 136 - 145 mmol/L Final   01/03/2021 133 (L) 136 - 145 mmol/L Final      Potassium Potassium   Date Value Ref Range Status   01/05/2021 4.1 3.5 - 5.2 mmol/L Final   01/04/2021 4.0 3.5 - 5.2 mmol/L Final   01/03/2021 3.9 3.5 - 5.2 mmol/L Final      Chloride Chloride   Date Value Ref Range Status   01/05/2021 98 98 - 107 mmol/L Final   01/04/2021 99 98 - 107 mmol/L Final   01/03/2021 98 98 - 107 mmol/L Final      CO2 CO2   Date Value Ref Range Status   01/05/2021 27.0 22.0 - 29.0 mmol/L Final   01/04/2021 26.0 22.0 - 29.0 mmol/L Final   01/03/2021 24.0 22.0 - 29.0 mmol/L Final      BUN BUN   Date Value Ref Range Status   01/05/2021 10 6 - 20 mg/dL Final   01/04/2021 11 6 - 20 mg/dL Final   01/03/2021 12 6 - 20 mg/dL Final      Creatinine Creatinine   Date Value Ref Range Status   01/05/2021 1.66 (H) 0.57 - 1.00 mg/dL Final   01/04/2021 1.49 (H) 0.57 - 1.00 mg/dL Final   01/03/2021 1.41 (H) 0.57 - 1.00 mg/dL Final      Calcium Calcium   Date Value Ref Range Status   01/05/2021 8.5 (L) 8.6 - 10.5 mg/dL Final   01/04/2021 8.2 (L) 8.6 - 10.5 mg/dL Final   01/03/2021 8.4 (L) 8.6 - 10.5 mg/dL Final      PO4 No components found for: PO4   Albumin Albumin   Date Value Ref Range Status   01/04/2021 2.50 (L) 3.50 - 5.20 g/dL Final   01/03/2021 2.50 (L) 3.50 - 5.20 g/dL Final      Magnesium Magnesium   Date Value Ref Range Status    01/05/2021 1.9 1.6 - 2.6 mg/dL Final   01/04/2021 2.1 1.6 - 2.6 mg/dL Final   01/03/2021 1.7 1.6 - 2.6 mg/dL Final      Uric Acid No components found for: URIC ACID     Imaging Results (Last 72 Hours)     ** No results found for the last 72 hours. **          Results for orders placed during the hospital encounter of 12/31/20   XR Chest 1 View    Narrative DATE OF EXAM:  12/31/2020 6:44 PM     PROCEDURE:  XR CHEST 1 VW-     INDICATIONS:  Shortness of breath, lower extremity swelling, hypertension.      COMPARISON:  12/22/2020.     TECHNIQUE:   Single radiographic view of the chest was obtained.     FINDINGS:  The heart is enlarged. There is abnormal right basilar consolidation  felt to represent either pneumonia or atelectasis with a small pleural  effusion. The left lung and pleural space are clear. The pulmonary  vascular markings are normal.  The bony thorax is normal for age.       Impression    1. Cardiomegaly.  2. Interval development of right basilar consolidation felt to represent  either pneumonia or atelectasis with a small pleural effusion.     Electronically Signed By-Raul Valdes MD On:12/31/2020 6:53 PM  This report was finalized on [id number] by  Raul Valdes MD.       Results for orders placed during the hospital encounter of 12/31/20   Duplex Venous Upper Extremity - Left CAR    Narrative · Acute left upper extremity superficial thrombophlebitis noted in the   basilic (upper arm), basilic (forearm), cephalic (upper arm), cephalic   (forearm) and median cubital.  · All other left sided vessels appear normal.            ASSESSMENT / PLAN      Congestive heart failure (CMS/HCC)      Acute kidney injury with stable BUN/Cr. Acid-base, lytes okay.  Volume excess improving. Stop IV Lasix and put on po Bumex.  Hyponatremia----Stable with diuresis  Pneumonia/Sepsis clinically improved.  MSSA bacteremia from the left shoulder with osteomyelitis----Follow CK as patient on Daptomycin  Anemia with stable  H/H.  HTN with stable BP.  Mild Hypomagnesemia----Replaced  Hypocalcemia to be replaced IV          watch CPK while on daptomycin  Creatinine is within the range   Correct electrolytes    Maicol Anderson MD

## 2021-01-05 NOTE — PROGRESS NOTES
Continued Stay Note  [facility]     Patient Name: Radha Curtis  MRN: [id number]  Today's Date: 1/5/2021    Admit Date: 12/31/2020    Discharge Plan     Row Name 01/05/21 1410       Plan    Plan  D/C Plan: Home with family. [facility] for IV abx-will need orders. primary care provider information added to AVS per pt request.    Plan Comments  Barrier to D/C: IV abx, MYLES tomorrow.          Expected Discharge Date and Time     Expected Discharge Date Expected Discharge Time    Jan 7, 2021             Monique Soto

## 2021-01-05 NOTE — PLAN OF CARE
Goal Outcome Evaluation:  Plan of Care Reviewed With: patient  Progress: no change  Outcome Summary: Patient is stable, resting between care. Pain treated with PRN PO medication. Antibiotics to continue. Patient has stated that her medical situation has not been explained to her. Nurse and MD talked with patient and made sure she understands what is going on. Will continue to monitor.

## 2021-01-06 VITALS
DIASTOLIC BLOOD PRESSURE: 90 MMHG | HEART RATE: 100 BPM | BODY MASS INDEX: 36.03 KG/M2 | TEMPERATURE: 99.4 F | WEIGHT: 216.27 LBS | RESPIRATION RATE: 18 BRPM | HEIGHT: 65 IN | SYSTOLIC BLOOD PRESSURE: 144 MMHG | OXYGEN SATURATION: 95 %

## 2021-01-06 DIAGNOSIS — E66.01 MORBIDLY OBESE (HCC): ICD-10-CM

## 2021-01-06 PROBLEM — J18.9 PNEUMONIA DUE TO INFECTIOUS ORGANISM: Status: ACTIVE | Noted: 2021-01-06

## 2021-01-06 LAB
ALBUMIN SERPL-MCNC: 2.6 G/DL (ref 3.5–5.2)
ALBUMIN/GLOB SERPL: 0.7 G/DL
ALP SERPL-CCNC: 128 U/L (ref 39–117)
ALT SERPL W P-5'-P-CCNC: 12 U/L (ref 1–33)
ANION GAP SERPL CALCULATED.3IONS-SCNC: 10 MMOL/L (ref 5–15)
AST SERPL-CCNC: 14 U/L (ref 1–32)
BACTERIA SPEC AEROBE CULT: NORMAL
BASOPHILS # BLD AUTO: 0.1 10*3/MM3 (ref 0–0.2)
BASOPHILS NFR BLD AUTO: 1 % (ref 0–1.5)
BILIRUB SERPL-MCNC: 0.4 MG/DL (ref 0–1.2)
BUN SERPL-MCNC: 10 MG/DL (ref 6–20)
BUN/CREAT SERPL: 6.4 (ref 7–25)
CALCIUM SPEC-SCNC: 8.8 MG/DL (ref 8.6–10.5)
CHLORIDE SERPL-SCNC: 97 MMOL/L (ref 98–107)
CK SERPL-CCNC: 44 U/L (ref 20–180)
CO2 SERPL-SCNC: 27 MMOL/L (ref 22–29)
CREAT SERPL-MCNC: 1.56 MG/DL (ref 0.57–1)
CRP SERPL-MCNC: 3.35 MG/DL (ref 0–0.5)
DEPRECATED RDW RBC AUTO: 55.1 FL (ref 37–54)
EOSINOPHIL # BLD AUTO: 0.1 10*3/MM3 (ref 0–0.4)
EOSINOPHIL NFR BLD AUTO: 1.1 % (ref 0.3–6.2)
ERYTHROCYTE [DISTWIDTH] IN BLOOD BY AUTOMATED COUNT: 16.7 % (ref 12.3–15.4)
GFR SERPL CREATININE-BSD FRML MDRD: 36 ML/MIN/1.73
GLOBULIN UR ELPH-MCNC: 3.7 GM/DL
GLUCOSE BLDC GLUCOMTR-MCNC: 95 MG/DL (ref 70–105)
GLUCOSE SERPL-MCNC: 101 MG/DL (ref 65–99)
HCT VFR BLD AUTO: 32.8 % (ref 34–46.6)
HGB BLD-MCNC: 10.7 G/DL (ref 12–15.9)
LYMPHOCYTES # BLD AUTO: 1.2 10*3/MM3 (ref 0.7–3.1)
LYMPHOCYTES NFR BLD AUTO: 16.6 % (ref 19.6–45.3)
MCH RBC QN AUTO: 30.5 PG (ref 26.6–33)
MCHC RBC AUTO-ENTMCNC: 32.6 G/DL (ref 31.5–35.7)
MCV RBC AUTO: 93.6 FL (ref 79–97)
MONOCYTES # BLD AUTO: 0.5 10*3/MM3 (ref 0.1–0.9)
MONOCYTES NFR BLD AUTO: 6.8 % (ref 5–12)
NEUTROPHILS NFR BLD AUTO: 5.2 10*3/MM3 (ref 1.7–7)
NEUTROPHILS NFR BLD AUTO: 74.5 % (ref 42.7–76)
NRBC BLD AUTO-RTO: 0 /100 WBC (ref 0–0.2)
PLATELET # BLD AUTO: 308 10*3/MM3 (ref 140–450)
PMV BLD AUTO: 7.5 FL (ref 6–12)
POTASSIUM SERPL-SCNC: 4 MMOL/L (ref 3.5–5.2)
PROT SERPL-MCNC: 6.3 G/DL (ref 6–8.5)
RBC # BLD AUTO: 3.5 10*6/MM3 (ref 3.77–5.28)
SODIUM SERPL-SCNC: 134 MMOL/L (ref 136–145)
WBC # BLD AUTO: 6.9 10*3/MM3 (ref 3.4–10.8)

## 2021-01-06 PROCEDURE — C1751 CATH, INF, PER/CENT/MIDLINE: HCPCS

## 2021-01-06 PROCEDURE — 80053 COMPREHEN METABOLIC PANEL: CPT | Performed by: INTERNAL MEDICINE

## 2021-01-06 PROCEDURE — 82962 GLUCOSE BLOOD TEST: CPT

## 2021-01-06 PROCEDURE — 25010000002 DAPTOMYCIN PER 1 MG: Performed by: NURSE PRACTITIONER

## 2021-01-06 PROCEDURE — 86140 C-REACTIVE PROTEIN: CPT | Performed by: INTERNAL MEDICINE

## 2021-01-06 PROCEDURE — 25010000002 LINEZOLID 600 MG/300ML SOLUTION: Performed by: NURSE PRACTITIONER

## 2021-01-06 PROCEDURE — 02HV33Z INSERTION OF INFUSION DEVICE INTO SUPERIOR VENA CAVA, PERCUTANEOUS APPROACH: ICD-10-PCS | Performed by: INTERNAL MEDICINE

## 2021-01-06 PROCEDURE — 85025 COMPLETE CBC W/AUTO DIFF WBC: CPT | Performed by: INTERNAL MEDICINE

## 2021-01-06 PROCEDURE — 82550 ASSAY OF CK (CPK): CPT | Performed by: INTERNAL MEDICINE

## 2021-01-06 PROCEDURE — 99233 SBSQ HOSP IP/OBS HIGH 50: CPT | Performed by: INTERNAL MEDICINE

## 2021-01-06 PROCEDURE — 99239 HOSP IP/OBS DSCHRG MGMT >30: CPT | Performed by: HOSPITALIST

## 2021-01-06 RX ORDER — NICOTINE 21 MG/24HR
1 PATCH, TRANSDERMAL 24 HOURS TRANSDERMAL NIGHTLY
Qty: 28 PATCH | Refills: 0 | Status: SHIPPED | OUTPATIENT
Start: 2021-01-06 | End: 2022-11-03

## 2021-01-06 RX ORDER — OXYCODONE HYDROCHLORIDE 5 MG/1
5 TABLET ORAL EVERY 4 HOURS PRN
Qty: 18 TABLET | Refills: 0 | Status: SHIPPED | OUTPATIENT
Start: 2021-01-06 | End: 2021-01-06 | Stop reason: SDUPTHER

## 2021-01-06 RX ORDER — BUMETANIDE 1 MG/1
1 TABLET ORAL 2 TIMES DAILY
Qty: 60 TABLET | Refills: 0 | Status: SHIPPED | OUTPATIENT
Start: 2021-01-06 | End: 2022-11-03

## 2021-01-06 RX ORDER — DOXYCYCLINE 100 MG/1
100 CAPSULE ORAL 2 TIMES DAILY
Qty: 10 CAPSULE | Refills: 0 | Status: SHIPPED | OUTPATIENT
Start: 2021-01-06 | End: 2021-01-11

## 2021-01-06 RX ORDER — DOXYCYCLINE 100 MG/1
100 TABLET ORAL EVERY 12 HOURS SCHEDULED
Status: DISCONTINUED | OUTPATIENT
Start: 2021-01-06 | End: 2021-01-06 | Stop reason: HOSPADM

## 2021-01-06 RX ORDER — OXYCODONE HYDROCHLORIDE 5 MG/1
5 TABLET ORAL EVERY 4 HOURS PRN
Qty: 18 TABLET | Refills: 0 | Status: SHIPPED | OUTPATIENT
Start: 2021-01-06 | End: 2022-11-03

## 2021-01-06 RX ORDER — ASPIRIN 325 MG
325 TABLET, DELAYED RELEASE (ENTERIC COATED) ORAL DAILY
Qty: 30 TABLET | Refills: 0 | Status: SHIPPED | OUTPATIENT
Start: 2021-01-07 | End: 2022-11-03

## 2021-01-06 RX ORDER — NICOTINE 21 MG/24HR
1 PATCH, TRANSDERMAL 24 HOURS TRANSDERMAL NIGHTLY
Qty: 28 PATCH | Refills: 0 | Status: SHIPPED | OUTPATIENT
Start: 2021-01-06 | End: 2021-01-06

## 2021-01-06 RX ADMIN — OXYCODONE 5 MG: 5 TABLET ORAL at 11:19

## 2021-01-06 RX ADMIN — OXYCODONE 5 MG: 5 TABLET ORAL at 17:02

## 2021-01-06 RX ADMIN — DAPTOMYCIN 450 MG: 500 INJECTION, POWDER, LYOPHILIZED, FOR SOLUTION INTRAVENOUS at 17:46

## 2021-01-06 RX ADMIN — LINEZOLID 600 MG: 600 INJECTION, SOLUTION INTRAVENOUS at 05:39

## 2021-01-06 RX ADMIN — LINEZOLID 600 MG: 600 INJECTION, SOLUTION INTRAVENOUS at 14:19

## 2021-01-06 RX ADMIN — OXYCODONE 5 MG: 5 TABLET ORAL at 01:27

## 2021-01-06 NOTE — PROGRESS NOTES
Continued Stay Note  [facility]     Patient Name: Radha Curtis  MRN: [id number]  Today's Date: 1/6/2021    Admit Date: 12/31/2020    Discharge Plan     Row Name 01/06/21 1416       Plan    Plan  D/C Plan: Home with family. [facility] accepted for IV abx and will arrange labs and line care at [facility].    Plan Comments   notified by bedside RN that patient will be getting PICC line today and will d/c with PICC line. CM sent referral to [facility] and [facility] regarding possible IV abx/home health. [facility] declined and [facility] accepted. CM sent message to Dr Pruitt about [facility] vs [facility] for abx at home-awaiting reply. CM called and spoke to patient-notified that [facility] can accept for IV abx at home and will arrange for labs and line care at [facility]-patient is agreeable. CM notified [facility] liaison who will contact patient. CM updated bedside RN. Barrier to D/C: IV abx, MYLES tomorrow.          Expected Discharge Date and Time     Expected Discharge Date Expected Discharge Time    Jan 7, 2021             Case Management Readmission Assessment Note       Case Management Readmission Assessment (all recorded)      Readmission Interview     Row Name 01/06/21 0499             Readmission Indications    Is this hospitalization related to the prior hospital diagnosis?  Yes      What was the reason you were admitted?  Patient was discharged home on 12/29 and set up with IV abx at [facility] daily. Patient has two IV abx infusions and then presented to ER with complaints of pain in shoulder and swelling in legs.      Row Name 01/06/21 1430             Recommendation for rehospitalization    Did you speak with your physician prior to coming to the hospital  No      Did you seek care elsewhere prior to coming to the hospital?  No      Row Name 01/06/21 1430             Follow up appointment    Do you have a PCP?  No      Row  Name 01/06/21 1430             Medications    Did you have newly prescribed medications at discharge?  Yes      Did you understand the reasons for your medications at discharge and how to take them?  Yes      Did you understand the side effects of your medications?  Yes      Are you taking all of you prescribed medications?  Yes      Row Name 01/06/21 1430             Discharge Instructions    Did you understand your discharge instructions?  Yes      Did your family/caregiver hear your instructions?  Yes      Were you given a number of someone to call if you had questions or concerns?  Yes      Row Name 01/06/21 1430             Index discharge location/services    Where did you go upon discharge?  Home      Do you have supportive family or friends in the home?  Yes      What services were arranged at discharge?  Other (comment) Daily IV abx at [facility]      Row Name 01/06/21 1430             Discharge Readiness    On a scale of 1-5 (5 being well prepared), how ready were you for discharge  4

## 2021-01-06 NOTE — PROGRESS NOTES
Reason for follow-up: Acute congestive heart failure  Tricuspid insufficiency  Septic arthritis with bacteremia     Patient Care Team:  Danie Dee MD as PCP - General    Subjective .   Feels much better wants to go home     Review of Systems   Constitution: Positive for malaise/fatigue. Negative for fever.   HENT: Negative for congestion and hearing loss.    Eyes: Negative for double vision and visual disturbance.   Cardiovascular: Negative for chest pain, claudication, dyspnea on exertion, leg swelling and syncope.   Respiratory: Negative for cough and shortness of breath.    Endocrine: Negative for cold intolerance.   Skin: Negative for color change and rash.   Musculoskeletal: Negative for arthritis and joint pain.   Gastrointestinal: Negative for abdominal pain and heartburn.   Genitourinary: Negative for hematuria.   Neurological: Negative for excessive daytime sleepiness and dizziness.   Psychiatric/Behavioral: Negative for depression. The patient is not nervous/anxious.    All other systems reviewed and are negative.      Rocephin [ceftriaxone] and Penicillins    Scheduled Meds:aspirin, 325 mg, Oral, Daily  bumetanide, 1 mg, Oral, BID  DAPTOmycin, 6 mg/kg (Adjusted), Intravenous, Q24H  doxycycline, 100 mg, Oral, Q12H  nicotine, 1 patch, Transdermal, Nightly  sodium chloride, 10 mL, Intravenous, Q12H      Continuous Infusions:   PRN Meds:.•  acetaminophen **OR** acetaminophen **OR** acetaminophen  •  calcium carbonate  •  diphenhydrAMINE  •  diphenhydrAMINE-zinc acetate  •  ondansetron **OR** ondansetron  •  oxyCODONE  •  sodium chloride    Objective   Looks comfortable lying in the bed    VITAL SIGNS  Vitals:    01/05/21 2020 01/05/21 2100 01/06/21 0500 01/06/21 1153   BP: 119/72  134/84 144/90   BP Location:    Right arm   Patient Position:    Sitting   Pulse: 93 95 94 100   Resp:  18 16 18   Temp:  98.3 °F (36.8 °C) 97.6 °F (36.4 °C) 99.4 °F (37.4 °C)   TempSrc:    Oral   SpO2:  90% 95%    Weight:   " 98.1 kg (216 lb 4.3 oz)    Height:           Flowsheet Rows      First Filed Value   Admission Height  165.1 cm (65\") Documented at 12/31/2020 1712   Admission Weight  101 kg (222 lb 14.2 oz) Documented at 12/31/2020 1712           TELEMETRY: Sinus rhythm    Physical Exam:  Constitutional:       Appearance: Well-developed.   Eyes:      General: No scleral icterus.     Conjunctiva/sclera: Conjunctivae normal.   HENT:      Head: Normocephalic and atraumatic.    Mouth/Throat:      Mouth: No oral lesions.      Pharynx: Uvula midline.   Neck:      Musculoskeletal: Neck supple.      Thyroid: No thyromegaly.      Vascular: No carotid bruit or JVD.      Trachea: Trachea normal.   Pulmonary:      Effort: Pulmonary effort is normal.      Breath sounds: Normal breath sounds.   Cardiovascular:      Normal rate. Regular rhythm.      Murmurs: There is a systolic murmur.      No gallop.   Pulses:     Intact distal pulses.   Edema:     Pretibial: bilateral trace edema of the pretibial area.     Ankle: bilateral trace edema of the ankle.  Abdominal:      General: Bowel sounds are normal.      Palpations: Abdomen is soft.   Musculoskeletal: Normal range of motion.   Skin:     General: Skin is warm. There is no cyanosis.   Neurological:      Mental Status: Alert and oriented to person, place, and time.      Comments: No focal deficits   Psychiatric:         Behavior: Behavior is cooperative.                  LAB RESULTS (LAST 7 DAYS)    CBC  Results from last 7 days   Lab Units 01/06/21  0222 01/05/21  0437 01/04/21  0514 01/03/21  0319 01/02/21  0306 01/01/21  0212 12/31/20  2142   WBC 10*3/mm3 6.90 6.50 7.50 6.90 7.30 7.10 8.00   RBC 10*6/mm3 3.50* 3.35* 3.32* 3.43* 3.38* 3.32* 3.65*   HEMOGLOBIN g/dL 10.7* 10.2* 10.0* 10.5* 10.6* 10.2* 11.2*   HEMATOCRIT % 32.8* 31.4* 31.4* 32.5* 32.1* 31.0* 34.2   MCV fL 93.6 93.9 94.5 94.6 95.0 93.4 93.7   PLATELETS 10*3/mm3 308 256 261 295 267 299 337       BMP  Results from last 7 days   Lab " Units 01/06/21 0222 01/05/21 0437 01/04/21  0514 01/03/21 0319 01/02/21 0306 01/01/21 0212 12/31/20  2142   SODIUM mmol/L 134* 134* 132* 133* 134* 135* 135*   POTASSIUM mmol/L 4.0 4.1 4.0 3.9 4.0 4.1 4.0   CHLORIDE mmol/L 97* 98 99 98 101 101 101   CO2 mmol/L 27.0 27.0 26.0 24.0 24.0 25.0 25.0   BUN mg/dL 10 10 11 12 14 15 16   CREATININE mg/dL 1.56* 1.66* 1.49* 1.41* 1.43* 1.42* 1.40*   GLUCOSE mg/dL 101* 127* 104* 119* 95 102* 104*   MAGNESIUM mg/dL  --  1.9 2.1 1.7  --   --   --    PHOSPHORUS mg/dL  --  3.9 4.3 4.7*  --   --   --        CMP   Results from last 7 days   Lab Units 01/06/21 0222 01/05/21 0437 01/04/21  0514 01/03/21 0319 01/02/21 0306 01/01/21 0212 12/31/20  2142   SODIUM mmol/L 134* 134* 132* 133* 134* 135* 135*   POTASSIUM mmol/L 4.0 4.1 4.0 3.9 4.0 4.1 4.0   CHLORIDE mmol/L 97* 98 99 98 101 101 101   CO2 mmol/L 27.0 27.0 26.0 24.0 24.0 25.0 25.0   BUN mg/dL 10 10 11 12 14 15 16   CREATININE mg/dL 1.56* 1.66* 1.49* 1.41* 1.43* 1.42* 1.40*   GLUCOSE mg/dL 101* 127* 104* 119* 95 102* 104*   ALBUMIN g/dL 2.60*  --  2.50* 2.50* 2.50*  --  3.00*   BILIRUBIN mg/dL 0.4  --  0.4 0.5 0.4  --  0.5   ALK PHOS U/L 128*  --  122* 123* 117  --  136*   AST (SGOT) U/L 14  --  22 17 25  --  25   ALT (SGPT) U/L 12  --  17 17 18  --  26         BNP        TROPONIN  Results from last 7 days   Lab Units 01/06/21  0222  01/01/21  0212   CK TOTAL U/L 44   < >  --    TROPONIN T ng/mL  --   --  <0.010    < > = values in this interval not displayed.       CoAg        Creatinine Clearance  Estimated Creatinine Clearance: 53.3 mL/min (A) (by C-G formula based on SCr of 1.56 mg/dL (H)).    ABG        Radiology  No radiology results for the last day          EKG        I personally viewed and interpreted the patient's EKG/Telemetry data:    ECHOCARDIOGRAM:    Results for orders placed during the hospital encounter of 12/31/20   Adult Transthoracic Echo Limited W/ Cont if Necessary Per Protocol    Narrative ·  Estimated left ventricular EF = 60% Left ventricular systolic function   is normal.     Indications  Shortness of breath    Technically satisfactory study.  Mitral valve is structurally normal.  Tricuspid valve is structurally normal.  Moderate tricuspid regurgitation.  Aortic valve is structurally normal.  Pulmonic valve could not be well visualized.  No evidence for mitral or aortic regurgitation is seen by Doppler study.  Left atrium is normal in size.  Right atrium is enlarged  Left ventricle is normal in size and contractility with ejection fraction   of 60%.  Right ventricle is enlarged.  Atrial septum is intact.  Aorta is normal.  No pericardial effusion or intracardiac thrombus is seen.    Impression  Moderate tricuspid regurgitation.  Right atrium right ventricle enlargement.  Otherwise structurally and functionally normal cardiac valves.  Left ventricular size and contractility is normal with ejection fraction   of 60%.  No pericardial effusion is present.     STRESS MYOVIEW:    CARDIAC CATHETERIZATION:    OTHER:         Assessment/Plan       Severe sepsis (CMS/HCC)    Anxiety    Depression    Hyperlipidemia    Hypertension    Restless leg syndrome    Tobacco use    Type 2 diabetes mellitus without complications (CMS/HCC)    Drug abuse, IV (CMS/HCC)    Septic arthritis of AC joint (CMS/[facility])    MSSA bacteremia    Congestive heart failure (CMS/[facility])    Pneumonia due to infectious organism      Acute diastolic congestive heart failure  Also most probably right heart failure  Moderate tricuspid insufficiency  Patient is feeling much better  Patient denying any drug use recently  She is on adequate antibiotics for bacteremia  Discussed with the infectious disease  No persistent bacteremia  Source most probably septic arthritis  So I discussed with the patient about the option of MYLES to evaluate the tricuspid valve other structural heart disease  But I also discussed the risks of the procedure patient decided to  continue current treatment  Her edema is getting better she is breathing better  She agreed to follow-up with me as an outpatient if any change in clinical status strongly suggest MYLES in the future    I discussed the patients findings and my recommendations with patient and attending physician    Rikki Hammond MD  01/06/21  17:06 EST

## 2021-01-06 NOTE — DISCHARGE SUMMARY
[facility]  DISCHARGE SUMMARY        Prepared For PCP:  Danie Dee MD    Patient Name: Radha Curtis  : 1975  MRN: [id number]      Date of Admission:   2020    Date of Discharge:  2021    Length of stay:  LOS: 2 days     Hospital Course     Presenting Problem:   Edema, unspecified type [R60.9]  Pneumonia due to infectious organism, unspecified laterality, unspecified part of lung [J18.9]  Congestive heart failure, unspecified HF chronicity, unspecified heart failure type (CMS/HCC) [I50.9]  Congestive heart failure, unspecified HF chronicity, unspecified heart failure type (CMS/HCC) [I50.9]      Active Hospital Problems    Diagnosis  POA    Pneumonia due to infectious organism [J18.9]  Yes    Congestive heart failure (CMS/[facility]) [I50.9]  Yes    MSSA bacteremia [R78.81, B95.61]  Yes    Severe sepsis (CMS/[facility]) [A41.9, R65.20]  Yes    Depression [F32.9]  Yes    Hypertension [I10]  Yes    Type 2 diabetes mellitus without complications (CMS/[facility]) [E11.9]  Yes    Drug abuse, IV (CMS/[facility]) [F19.10]  Yes    Septic arthritis of AC joint (CMS/[facility]) [M00.9]  Yes    Anxiety [F41.9]  Yes    Hyperlipidemia [E78.5]  Yes    Tobacco use [Z72.0]  Yes    Restless leg syndrome [G25.81]  Yes      Resolved Hospital Problems   No resolved problems to display.     Assessment and plan:    Sepsis multifactorial secondary to pneumonia, MSSA bacteremia, and left shoulder septic joint      Healthcare facility acquired pneumonia, right lower and middle lobe status post recent hospitalization  -Respiratory panel including Covid 2021 was negative  -Patient is on daptomycin and Zyvox     Acute on chronic right systolic congestive heart failure secondary to pulmonary hypertension  -proBNP 8937, 7980  -Echo 1/3/2020 shows moderate tricuspid regurgitation  Pulmonic valve could not be visualized  EF 60%-Right ventricle is enlarged-No pericardial effusion  RVSP 61.2 mmHg (increased  from 49.1 mmHg on 12/23/2020)     Methicillin susceptible Staphylococcus aureus bacteremia due to IV drug abuse, possible bacterial endocarditis  -2D transthoracic echo showed no signs of vegetation.  Per cardio service are comfortable reading the transthoracic 2D echo since it was good imaging study.  The patient is also declining MYLES if is not absolutely necessary.     Left shoulder infection with left acromioclavicular septic joint   -Status post surgical washout during recent hospital stay  -Had been on IV antibiotics prior to admission:     --Previous plan Rocephin 2 g IV daily for 6 weeks, however the patient developed a drug eruption     --Now switched to daptomycin     --On Zyvox given pneumonia as well per ID     Left upper extremity edema secondary to SVTs, improving  -Doppler: No DVT, however superficial thrombophlebitis noted.   -Elevation and supportive care recommended     Acute kidney injury possibly cardiorenal versus due to vasculitis; other etiologies not excluded  -Nephrologist following     Purpuric rash on bilateral lower legs that patient complains are itching and burning  -Started 12/31/2020 after patient received IV Rocephin in ambulatory care clinic  -Likely reaction to antibiotics/ceftriaxone  -Improved significantly     Normocytic anemia likely of chronic disease  -Hemoglobin has remained stable         Hospital Course:  Radha Curtis is a 45 y.o. femalepresents to the ER with chief complaint of swelling to her left upper extremity and bilateral lower extremities as well as rash to her bilateral lower extremities which started today.  The patient has had cough without sputum production.  She denies subjective fever or chills.  The patient adamantly denies IV drug use although it is documented in review of records.  The patient states she does not know how that ended up in her records.  She reiterates that she has not ever, not even once, used IV drugs.       Recent hospital admission  12/22/2020 through 12/29/2020 for severe sepsis, UTI, hyponatremia.  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  Drain was left in and removed on 12/29/2020.  MSSA culture with recommendation for Rocephin x6 weeks.  Patient had MYLES 12/22/2020 showing LV function 65%, moderate TR, moderate pulmonary hypertension.  No vegetations seen.  Status post MRI of shoulder 12/24/2020 showing osteomyelitis.  Edema versus abscess.      Date:  1/1/21  Hurting and crying from lt ue pain  Pending v doppler ule  Resumed diuretics, added pain meds  Factious diseases and nephrology were consulted     1/2/21  Swelling left upper extremity improved  Venous Doppler left upper extremity showed superficial thrombophlebitis without DVT  Rash discussed with Dr. Pruitt   Will repeat 2D echo and may need MYLES     1/3/2021  She said she doesn't like the bed and wants to go home  Denies new events or symptoms  CRP slowly trending down  Has been afebrile but had mild tachycardia     1/4/2021  The patient denies specific complaints.  She expressed frustration with her ongoing health problems.  She had questions about needing the MYLES and all questions seem to be answered to her satisfaction.  I discussed the case with FAITH VAZQUEZ.     1/5/2021: Pt c/o frustration at how long she is taking to get better. She c/o not being told about her ongoing diagnoses although she has been told multiple times.      1/6/2021:   Patient complains of ongoing left shoulder pain.  She is anxious to go home.  I spoke with Dr. Rodriguez cardiology.  The patient is not wanting the MYLES and Dr. Rodriguez felt that since she is going to be treated with IV antibiotics for 6 weeks anyway that it was not absolutely necessary that it be done at this time.     The patient was felt to be stable for discharge.          Day of Discharge     Vital Signs:   Temp:  [97.6 °F (36.4 °C)-99.4 °F (37.4 °C)] 99.4 °F (37.4 °C)  Heart Rate:  [] 100  Resp:  [16-18] 18  BP:  (134-144)/(84-90) 144/90     Physical Exam:  Physical Exam   Nurses notes and vital signs reviewed.  Well-developed over-nourished female in no acute distress sitting up in bed awake and alert; mucous membranes moist; sclerae anicteric; lungs clear to auscultation bilaterally; CV regular rate and rhythm; abdomen soft nontender nondistended; extremities with no edema, cyanosis or calf tenderness; no Guerrero catheter.    Pertinent  and/or Most Recent Results     Results from last 7 days   Lab Units 01/06/21  0222 01/05/21  0437 01/04/21  0514 01/03/21  0319 01/02/21  0306 01/01/21  0212 12/31/20  2142   WBC 10*3/mm3 6.90 6.50 7.50 6.90 7.30 7.10 8.00   HEMOGLOBIN g/dL 10.7* 10.2* 10.0* 10.5* 10.6* 10.2* 11.2*   HEMATOCRIT % 32.8* 31.4* 31.4* 32.5* 32.1* 31.0* 34.2   PLATELETS 10*3/mm3 308 256 261 295 267 299 337   SODIUM mmol/L 134* 134* 132* 133* 134* 135* 135*   POTASSIUM mmol/L 4.0 4.1 4.0 3.9 4.0 4.1 4.0   CHLORIDE mmol/L 97* 98 99 98 101 101 101   CO2 mmol/L 27.0 27.0 26.0 24.0 24.0 25.0 25.0   BUN mg/dL 10 10 11 12 14 15 16   CREATININE mg/dL 1.56* 1.66* 1.49* 1.41* 1.43* 1.42* 1.40*   GLUCOSE mg/dL 101* 127* 104* 119* 95 102* 104*   CALCIUM mg/dL 8.8 8.5* 8.2* 8.4* 8.0* 8.1* 8.5*     Results from last 7 days   Lab Units 01/06/21 0222 01/04/21  0514 01/03/21  0319 01/02/21  0306 12/31/20  2142   BILIRUBIN mg/dL 0.4 0.4 0.5 0.4 0.5   ALK PHOS U/L 128* 122* 123* 117 136*   ALT (SGPT) U/L 12 17 17 18 26   AST (SGOT) U/L 14 22 17 25 25           Invalid input(s): TG, LDLCALC, LDLREALC  Results from last 7 days   Lab Units 01/01/21  0212 01/01/21  0146 12/31/20  2142   TSH uIU/mL 1.900  --   --    PROBNP pg/mL 7,980.0*  --  8,937.0*   TROPONIN T ng/mL <0.010  --  <0.010   PROCALCITONIN ng/mL  --   --  0.25   LACTATE mmol/L  --  0.9  --        Brief Urine Lab Results  (Last result in the past 365 days)        Color   Clarity   Blood   Leuk Est   Nitrite   Protein   CREAT   Urine HCG        01/01/21 1611 Yellow  Cloudy  Comment:  Result checked  Large (3+) Small (1+) Negative 30 mg/dL (1+)                 Microbiology Results Abnormal       Procedure Component Value - Date/Time    Blood Culture - Blood, Arm, Right [[id number]] Collected: 01/01/21 0021    Lab Status: Final result Specimen: Blood from Arm, Right Updated: 01/06/21 0030     Blood Culture No growth at 5 days    Blood Culture - Blood, Arm, Right [[id number]] Collected: 12/31/20 2339    Lab Status: Final result Specimen: Blood from Arm, Right Updated: 01/05/21 2345     Blood Culture No growth at 5 days    Urine Culture - Urine, Urine, Clean Catch [[id number]]  (Normal) Collected: 01/01/21 1611    Lab Status: Final result Specimen: Urine, Clean Catch Updated: 01/02/21 1537     Urine Culture No growth    Respiratory Panel PCR w/COVID-19(SARS-CoV-2) CARRIE/BARRETT/KANDY/PAD/[facility]/MAD/MAXIMILIANO In-House, NP Swab in UTM/VTM, 3-4 HR TAT - Swab, Nasopharynx [[id number]]  (Normal) Collected: 01/01/21 0143    Lab Status: Final result Specimen: Swab from Nasopharynx Updated: 01/01/21 0244     ADENOVIRUS, PCR Not Detected     Coronavirus 229E Not Detected     Coronavirus HKU1 Not Detected     Coronavirus NL63 Not Detected     Coronavirus OC43 Not Detected     COVID19 Not Detected     Human Metapneumovirus Not Detected     Human Rhinovirus/Enterovirus Not Detected     Influenza A PCR Not Detected     Influenza B PCR Not Detected     Parainfluenza Virus 1 Not Detected     Parainfluenza Virus 2 Not Detected     Parainfluenza Virus 3 Not Detected     Parainfluenza Virus 4 Not Detected     RSV, PCR Not Detected     Bordetella pertussis pcr Not Detected     Bordetella parapertussis PCR Not Detected     Chlamydophila pneumoniae PCR Not Detected     Mycoplasma pneumo by PCR Not Detected    Narrative:      Fact sheet for providers: https://docs.[employer]/wp-content/uploads/UER7745-5712-AW3.1-EUA-Provider-Fact-Sheet-3.pdf    Fact sheet for patients:  https://docs.[employer]/wp-content/uploads/OHC3817-2080-IC7.1-EUA-Patient-Fact-Sheet-1.pdf    Test performed by PCR.            Xr Shoulder 2+ View Left    Result Date: 12/22/2020  Impression: No acute osseous abnormality.  Electronically Signed By-Ho Urban MD On:12/22/2020 7:52 PM This report was finalized on [id number] by  Ho Urban MD.    Xr Chest 1 View    Result Date: 12/31/2020  Impression:  1. Cardiomegaly. 2. Interval development of right basilar consolidation felt to represent either pneumonia or atelectasis with a small pleural effusion.  Electronically Signed By-Raul Valdes MD On:12/31/2020 6:53 PM This report was finalized on [id number] by  Raul Valdes MD.    Xr Chest 1 View    Result Date: 12/22/2020  Impression: No acute cardiopulmonary process.  Electronically Signed By-Ho Urban MD On:12/22/2020 7:52 PM This report was finalized on [id number] by  Ho Urban MD.    Ct Chest Pulmonary Embolism    Result Date: 12/31/2020  Impression: 1. Large consolidation in the right lower lobe. This likely represents a combination of atelectasis and consolidative right lower lobe pneumonia. There is an additional consolidation in the right middle lobe also likely representing atelectasis and consolidative pneumonia. Continuous radiographic follow-up to complete resolution is recommended. 2. There is a moderately sized right-sided pleural effusion. There is a trace left-sided pleural effusion. There is an element of early pulmonary edema. 3. Subtle reticulonodular type infiltrate in the anterior aspect of the right upper lobe likely representing mild infectious bronchopneumonia. 4. Hepatomegaly with hepatic steatosis. 5. Emphysema. 6. Body wall edema. 7. No definitive pulmonary embolus. Slot 63 Electronically signed by:  Marvin Martinez M.D.  12/31/2020 8:59 PM    Mri Shoulder Left With & Without Contrast    Result Date: 12/24/2020  Impression: 1.Evidence for changes of septic arthropathy  involving the left acromioclavicular joint. There is evidence for associated osteomyelitis involving the adjacent clavicle and acromion. 2.There is extensive edema and enhancement throughout the surrounding soft tissues overlying the region of the left clavicle and left shoulder. These findings indicate extensive soft tissue infection throughout this region. There is evidence for spread infection to involve the soft tissues at the base of the left neck as well as at the anterior left lateral aspect of the thoracic inlet. 3.Evidence for developing myonecrosis and possible deep muscular soft tissue abscess along the superior margin of the trapezius musculature. This area of myonecrosis and possible developing abscess measures approximately 10 cm x 1.5 cm in axial dimensions. Electronically Signed: Sohan Eng MD 12/24/2020 13:41 EST    Ir Inject/asp Large Joint Or Bursa    Result Date: 12/24/2020  Impression: Technically successful fluoroscopically guided 22-gauge left shoulder joint aspiration, as described.  Electronically Signed By-Jeane Torres MD On:12/24/2020 10:25 AM This report was finalized on [id number] by  Jeane Torres MD.      Results for orders placed during the hospital encounter of 12/31/20   Duplex Venous Upper Extremity - Left CAR    Narrative · Acute left upper extremity superficial thrombophlebitis noted in the   basilic (upper arm), basilic (forearm), cephalic (upper arm), cephalic   (forearm) and median cubital.  · All other left sided vessels appear normal.          Results for orders placed during the hospital encounter of 12/31/20   Duplex Venous Upper Extremity - Left CAR    Narrative · Acute left upper extremity superficial thrombophlebitis noted in the   basilic (upper arm), basilic (forearm), cephalic (upper arm), cephalic   (forearm) and median cubital.  · All other left sided vessels appear normal.          Results for orders placed during the hospital encounter of 12/31/20    Adult Transthoracic Echo Limited W/ Cont if Necessary Per Protocol    Narrative · Estimated left ventricular EF = 60% Left ventricular systolic function   is normal.     Indications  Shortness of breath    Technically satisfactory study.  Mitral valve is structurally normal.  Tricuspid valve is structurally normal.  Moderate tricuspid regurgitation.  Aortic valve is structurally normal.  Pulmonic valve could not be well visualized.  No evidence for mitral or aortic regurgitation is seen by Doppler study.  Left atrium is normal in size.  Right atrium is enlarged  Left ventricle is normal in size and contractility with ejection fraction   of 60%.  Right ventricle is enlarged.  Atrial septum is intact.  Aorta is normal.  No pericardial effusion or intracardiac thrombus is seen.    Impression  Moderate tricuspid regurgitation.  Right atrium right ventricle enlargement.  Otherwise structurally and functionally normal cardiac valves.  Left ventricular size and contractility is normal with ejection fraction   of 60%.  No pericardial effusion is present.               Test Results Pending at Discharge         Procedures Performed           Consults:   Consults       Date and Time Order Name Status Description    1/4/2021 1214 Inpatient Cardiology Consult Completed     1/1/2021 1137 Inpatient Nephrology Consult Completed     1/1/2021 0934 Inpatient Infectious Diseases Consult Completed     1/1/2021 0933 Inpatient Nephrology Consult      12/31/2020 2337 Hospitalist (on-call MD unless specified) Completed     12/24/2020 1455 Inpatient Nephrology Consult Completed     12/23/2020 1609 Inpatient Cardiology Consult Completed     12/23/2020 1208 Inpatient Infectious Diseases Consult Completed     12/23/2020 1208 Inpatient Orthopedic Surgery Consult Completed     12/22/2020 2017 Hospitalist (on-call MD unless specified) Completed               Discharge Details        Discharge Medications        New Medications         Instructions Start Date   aspirin  MG tablet   325 mg, Oral, Daily   Start Date: January 7, 2021     bumetanide 1 MG tablet  Commonly known as: BUMEX   1 mg, Oral, 2 Times Daily      DAPTOmycin 450 mg in sodium chloride 0.9 % 50 mL   6 mg/kg (450 mg), Intravenous, Every 24 Hours      doxycycline 100 MG capsule  Commonly known as: MONODOX   100 mg, Oral, 2 Times Daily      nicotine 21 MG/24HR patch  Commonly known as: NICODERM CQ   1 patch, Transdermal, Nightly      oxyCODONE 5 MG immediate release tablet  Commonly known as: Roxicodone   5 mg, Oral, Every 4 Hours PRN             Stop These Medications      cefTRIAXone 2 g in sodium chloride 0.9 % 100 mL IVPB     miconazole 2 % vaginal cream  Commonly known as: MICOTIN              Allergies   Allergen Reactions    Rocephin [Ceftriaxone] Rash    Penicillins Hives         [place] [facility] [place]  Hospital:  Diet Order   Procedures    Diet Cardiac; Healthy Heart         Discharge Activity:   Activity Instructions       Activity as Tolerated                CODE STATUS:    Code Status and Medical Interventions:   Ordered at: 12/31/20 0371     Code Status:    CPR     Medical Interventions (Level of Support Prior to Arrest):    Full           Additional Instructions for the Follow-ups that You Need to Schedule       Call MD With Problems / Concerns   As directed      Instructions: Call [phone] or email [email or web address] for problems or concerns.    Order Comments: Instructions: Call [phone] or email [email or web address] for problems or concerns.          Discharge Follow-up with PCP   As directed       Currently Documented PCP:    Danie Dee MD    PCP Phone Number:    [phone]     Follow Up Details: 2 to 3 days via telehealth if possible                   Condition on Discharge:      Stable      This patient has been examined wearing appropriate Personal Protective Equipment. 01/06/21      Electronically  signed by Magali Singh MD, 01/06/21, 5:05 PM EST.      Time: I spent 55 minutes on this discharge activity which included face-to-face encounter with the patient/reviewing the data in the system/coordination of the care with the nursing staff as well as consultants/documentation/entering orders.

## 2021-01-06 NOTE — PROGRESS NOTES
Infectious Diseases Progress Note      LOS: 2 days   Patient Care Team:  Danie Dee MD as PCP - General    Chief Complaint: Fatigue and skin rash    Subjective     The patient had no fever during the last 24 hours.  The patient remained hemodynamically stable.  The patient is on room air and states that her rash is feeling better.  She is complaining about left shoulder pain.      Review of Systems:   Review of Systems   Constitutional: Negative.    HENT: Negative.    Eyes: Negative.    Respiratory: Negative.    Cardiovascular: Positive for leg swelling.   Gastrointestinal: Negative.    Genitourinary: Negative.    Musculoskeletal: Negative.         Left shoulder pain   Skin: Positive for rash.   Neurological: Negative.    Hematological: Negative.    Psychiatric/Behavioral: Negative.         Objective     Vital Signs  Temp:  [97.6 °F (36.4 °C)-99.4 °F (37.4 °C)] 99.4 °F (37.4 °C)  Heart Rate:  [] 100  Resp:  [16-18] 18  BP: (119-144)/(72-90) 144/90    Physical Exam:  Physical Exam  Vitals signs and nursing note reviewed.   Constitutional:       Appearance: She is well-developed.   HENT:      Head: Normocephalic and atraumatic.   Eyes:      Pupils: Pupils are equal, round, and reactive to light.   Neck:      Musculoskeletal: Normal range of motion and neck supple.   Cardiovascular:      Rate and Rhythm: Normal rate and regular rhythm.      Heart sounds: Normal heart sounds.   Pulmonary:      Effort: Pulmonary effort is normal. No respiratory distress.      Breath sounds: Normal breath sounds. No wheezing or rales.   Abdominal:      General: Bowel sounds are normal. There is no distension.      Palpations: Abdomen is soft. There is no mass.      Tenderness: There is no abdominal tenderness. There is no guarding or rebound.   Musculoskeletal: Normal range of motion.         General: No deformity.   Skin:     General: Skin is warm.      Findings: Rash present. No erythema.      Comments: Petechial rash of the  lower extremities almost resolved   Neurological:      Mental Status: She is alert and oriented to person, place, and time.      Cranial Nerves: No cranial nerve deficit.          Results Review:    I have reviewed all clinical data, test, lab, and imaging results.     Radiology  No Radiology Exams Resulted Within Past 24 Hours    Cardiology    Laboratory    Results from last 7 days   Lab Units 01/06/21 0222 01/05/21 0437 01/04/21 0514 01/03/21 0319 01/02/21  0306 01/01/21 0212 12/31/20  2142   WBC 10*3/mm3 6.90 6.50 7.50 6.90 7.30 7.10 8.00   HEMOGLOBIN g/dL 10.7* 10.2* 10.0* 10.5* 10.6* 10.2* 11.2*   HEMATOCRIT % 32.8* 31.4* 31.4* 32.5* 32.1* 31.0* 34.2   PLATELETS 10*3/mm3 308 256 261 295 267 299 337     Results from last 7 days   Lab Units 01/06/21 0222 01/05/21 0437 01/04/21 0514 01/03/21 0319 01/02/21  0306 01/01/21 0212 12/31/20  2142   SODIUM mmol/L 134* 134* 132* 133* 134* 135* 135*   POTASSIUM mmol/L 4.0 4.1 4.0 3.9 4.0 4.1 4.0   CHLORIDE mmol/L 97* 98 99 98 101 101 101   CO2 mmol/L 27.0 27.0 26.0 24.0 24.0 25.0 25.0   BUN mg/dL 10 10 11 12 14 15 16   CREATININE mg/dL 1.56* 1.66* 1.49* 1.41* 1.43* 1.42* 1.40*   GLUCOSE mg/dL 101* 127* 104* 119* 95 102* 104*   ALBUMIN g/dL 2.60*  --  2.50* 2.50* 2.50*  --  3.00*   BILIRUBIN mg/dL 0.4  --  0.4 0.5 0.4  --  0.5   ALK PHOS U/L 128*  --  122* 123* 117  --  136*   AST (SGOT) U/L 14  --  22 17 25  --  25   ALT (SGPT) U/L 12  --  17 17 18  --  26   CALCIUM mg/dL 8.8 8.5* 8.2* 8.4* 8.0* 8.1* 8.5*     Results from last 7 days   Lab Units 01/06/21  0222   CK TOTAL U/L 44             Microbiology   Microbiology Results (last 10 days)     Procedure Component Value - Date/Time    Urine Culture - Urine, Urine, Clean Catch [[id number]]  (Normal) Collected: 01/01/21 1611    Lab Status: Final result Specimen: Urine, Clean Catch Updated: 01/02/21 1537     Urine Culture No growth    Respiratory Panel PCR w/COVID-19(SARS-CoV-2) CARRIE/BARRETT/KANDY/PAD/[facility]IMILIANO In-House, NP  Swab in UTM/VTM, 3-4 HR TAT - Swab, Nasopharynx [[id number]]  (Normal) Collected: 01/01/21 0143    Lab Status: Final result Specimen: Swab from Nasopharynx Updated: 01/01/21 0244     ADENOVIRUS, PCR Not Detected     Coronavirus 229E Not Detected     Coronavirus HKU1 Not Detected     Coronavirus NL63 Not Detected     Coronavirus OC43 Not Detected     COVID19 Not Detected     Human Metapneumovirus Not Detected     Human Rhinovirus/Enterovirus Not Detected     Influenza A PCR Not Detected     Influenza B PCR Not Detected     Parainfluenza Virus 1 Not Detected     Parainfluenza Virus 2 Not Detected     Parainfluenza Virus 3 Not Detected     Parainfluenza Virus 4 Not Detected     RSV, PCR Not Detected     Bordetella pertussis pcr Not Detected     Bordetella parapertussis PCR Not Detected     Chlamydophila pneumoniae PCR Not Detected     Mycoplasma pneumo by PCR Not Detected    Narrative:      Fact sheet for providers: https://docs.[employer]/wp-content/uploads/ERQ9675-4875-KK3.1-EUA-Provider-Fact-Sheet-3.pdf    Fact sheet for patients: https://docs.[employer]/wp-content/uploads/ROX6668-7687-IX3.1-EUA-Patient-Fact-Sheet-1.pdf    Test performed by PCR.    Blood Culture - Blood, Arm, Right [[id number]] Collected: 01/01/21 0021    Lab Status: Final result Specimen: Blood from Arm, Right Updated: 01/06/21 0030     Blood Culture No growth at 5 days    Blood Culture - Blood, Arm, Right [[id number]] Collected: 12/31/20 2339    Lab Status: Final result Specimen: Blood from Arm, Right Updated: 01/05/21 2345     Blood Culture No growth at 5 days          Medication Review:       Schedule Meds  aspirin, 325 mg, Oral, Daily  bumetanide, 1 mg, Oral, BID  DAPTOmycin, 6 mg/kg (Adjusted), Intravenous, Q24H  Linezolid, 600 mg, Intravenous, Q12H  nicotine, 1 patch, Transdermal, Nightly  sodium chloride, 10 mL, Intravenous, Q12H        Infusion Meds       PRN Meds  •  acetaminophen **OR** acetaminophen **OR** acetaminophen  •  calcium  carbonate  •  diphenhydrAMINE  •  diphenhydrAMINE-zinc acetate  •  ondansetron **OR** ondansetron  •  oxyCODONE  •  sodium chloride        Assessment/Plan       Antimicrobial Therapy   1.  IV daptomycin      day  2.  Zyvox      day  3.      Day  4.      Day  5.      Day      Assessment    Recent admission for methicillin susceptible Staphylococcus aureus bacteremia and IV drug user found during last admission.  Most likely secondary to left shoulder infection and left Acromioclavicular joint infection  2D echo showed no signs of vegetation from last admission.  Cardio service are comfortable reading the 2D echo alone since it was good imaging study.  -Patient was discharged on 12/29/2020 and was receiving IV Rocephin at the ambulatory care clinic  -Patient's 2D echo showed moderate tricuspid valve regurgitation.  Edema of the lower extremity improved and patient is not showing any signs of right heart failure.  Case was discussed with cardiology and patient is very hesitant to have a MYLES so MYLES will be canceled.  However if patient's symptoms change we might consider MYLES.     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  Intraoperative cultures are growing methicillin susceptible Staphylococcus  aureus     IV drug user  -Drug screen from last admission was positive for methamphetamines and opiates  -Hepatitis B and C and HIV screens were negative     Positive urine culture for methicillin susceptible Staph aureus found during last admission.  This is descending infection as a result of bacteremia     New finding this admission of right lower and middle lobe consolidation with right pleural effusion  -Patient denies shortness of breath and is currently on room air          Plan     Continue IV daptomycin 6 mg/kg every 24 hours for a total of 6 weeks of IV antibiotics.  The last day of IV daptomycin will be February 3, 2021  Discontinue Zyvox  Start doxycycline 100 mg p.o. twice daily for 5 days  The patient will need weekly labs including CBC, CMP and CPK until finishes antibiotics on February 3, 2021  Since patient not able to obtain peripheral access to give IV antibiotics she will go home with a PICC line.  Patient signed a contract that she will not abuse PICC line.  PICC line will be removed on February 3, 2020    Please fax all post discharge lab results, imaging studies and correspondence to this fax number [phone]  For any question or concern please contact our service number [phone]            Asaf Pruitt MD  01/06/21  14:34 EST      Note is dictated utilizing voice recognition software/Dragon

## 2021-01-06 NOTE — PROGRESS NOTES
[facility] Medicine Services Daily Progress Note      Hospitalist Team  LOS 1 days      Patient Care Team:  Danie Dee MD as PCP - General    Patient Location: 4125/1      Subjective   Subjective     Chief Complaint / Subjective  Chief Complaint   Patient presents with   • Leg Swelling         Brief Synopsis of Hospital Course/HPI    45-year-old female presents to the ER with chief complaint of swelling to her left upper extremity and bilateral lower extremities as well as rash to her bilateral lower extremities which started today.  The patient has had cough without sputum production.  She denies subjective fever or chills.  The patient adamantly denies IV drug use although it is documented in review of records.  The patient states she does not know how that ended up in her records.  She reiterates that she has not ever, not even once, used IV drugs.       Recent hospital admission 12/22/2020 through 12/29/2020 for severe sepsis, UTI, hyponatremia.  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  Drain was left in and removed on 12/29/2020.  MSSA culture with recommendation for Rocephin x6 weeks.  Patient had MYLES 12/22/2020 showing LV function 65%, moderate TR, moderate pulmonary hypertension.  No vegetations seen.  Status post MRI of shoulder 12/24/2020 showing osteomyelitis.  Edema versus abscess.            Date:  1/1/21  Hurting and crying from lt ue pain  Pending v doppler ule  Resumed diuretics, added pain meds  Factious diseases and nephrology were consulted    1/2/21  Swelling left upper extremity improved  Venous Doppler left upper extremity showed superficial thrombophlebitis without DVT  Rash discussed with Dr. Pruitt   Will repeat 2D echo and may need MYLES    1/3/2021  She said she doesn't like the bed and wants to go home  Denies new events or symptoms  CRP slowly trending down  Has been afebrile but had mild tachycardia    1/4/2021  The patient  "denies specific complaints.  She expressed frustration with her ongoing health problems.  She had questions about needing the MYLES and all questions seem to be answered to her satisfaction.  I discussed the case with FAITH VAZQUEZ.    1/5/2021: Pt c/o frustration at how long she is taking to get better. She c/o not being told about her ongoing diagnoses although she has been told multiple times.     ROS  All other systems reviewed and negative    Objective   Objective      Vital Signs  Temp:  [97.6 °F (36.4 °C)-98.3 °F (36.8 °C)] 98.3 °F (36.8 °C)  Heart Rate:  [96-99] 97  Resp:  [12-14] 14  BP: (117-127)/(70-73) 127/70  Oxygen Therapy  SpO2: 95 %  Pulse Oximetry Type: Intermittent  Device (Oxygen Therapy): room air  Flow (L/min): 2  Flowsheet Rows      First Filed Value   Admission Height  165.1 cm (65\") Documented at 12/31/2020 1712   Admission Weight  101 kg (222 lb 14.2 oz) Documented at 12/31/2020 1712        Intake & Output (last 3 days)       01/03 0701 - 01/04 0700 01/04 0701 - 01/05 0700 01/05 0701 - 01/06 0700    P.O. 360 480 680    I.V. (mL/kg)  400 (3.9) 608 (6)    IV Piggyback  200     Total Intake(mL/kg) 360 (3.5) 1080 (10.6) 1288 (12.6)    Urine (mL/kg/hr) 1600 (0.7) 300 (0.1) 1200 (0.9)    Stool   0    Total Output 8897 611 0427    Net -1240 +780 +88           Urine Unmeasured Occurrence  1 x     Stool Unmeasured Occurrence   1 x        Lines, Drains & Airways    Active LDAs     Name:   Placement date:   Placement time:   Site:   Days:    Peripheral IV 12/31/20 2148 Anterior;Distal;Right;Upper Arm   12/31/20 2148    Arm   less than 1                  Physical Exam:  Vital signs and nurses notes reviewed.  Exam unchanged from 1/4/2021  Well-developed over-nourished female awake and alert, comfortable on room air in no acute distress sitting up in bed awake and alert; mucous membranes moist; sclerae anicteric; lungs clear to auscultation bilaterally; CV regular rate and rhythm; abdomen soft nontender " nondistended with active bowel sounds; extremities with no edema, cyanosis or calf tenderness; palpable pedal pulses bilaterally; no appreciable left upper extremity swelling; bilateral lower extremity rash appears to be improving; no Guerrero catheter.       Wounds (last 24 hours)      LDA Wound     Row Name 01/05/21 1450 01/05/21 1110 01/05/21 0830       Wound 12/24/20 2219 Left anterior shoulder Incision    Wound - Properties Group Placement Date: 12/24/20  -PHILLIP Placement Time: 2219  -PHILLIP Present on Hospital Admission: Y  -PHILLIP Side: Left  -PHILLIP Orientation: anterior  -PHILLIP Location: shoulder  -PHILLIP Primary Wound Type: Incision  -PHILLIP    Dressing Appearance  dry;intact  -XC  dry;intact  -LB (r) XC (t) LB (c)  --    Closure  NATHANAEL  -XC  NATHANAEL  -LB (r) XC (t) LB (c)  --    Base  clean;dry  -XC  clean;dry  -LB (r) XC (t) LB (c)  --    Periwound  intact;dry  -XC  intact;dry  -LB (r) XC (t) LB (c)  --    Periwound Temperature  warm  -XC  warm  -LB (r) XC (t) LB (c)  --    Dressing Care  border dressing  -XC  border dressing  -LB (r) XC (t) LB (c)  dressing applied  -XC    Retired Wound - Properties Group Date first assessed: 12/24/20  -PHILLIP Time first assessed: 2219  -PHILLIP Present on Hospital Admission: Y  -PHILLIP Side: Left  -PHILLIP Location: shoulder  -PHILLIP Primary Wound Type: Incision  -PHILLIP       Rash 12/31/20 0750 medial abdomen other (see comments)    Rash - Properties Group Placement Date: 12/31/20  -CS Placement Time: 0750  -CS Orientation: medial  -CS Location: abdomen  -CS Type: other (see comments)  -CS Additional Comments: red pinpoint  -CS    Distribution  localized  -XC  localized  -LB (r) XC (t) LB (c)  --    Configuration/Shape  oval  -XC  oval  -LB (r) XC (t) LB (c)  --    Borders  indistinct  -XC  irregular  -LB (r) XC (t) LB (c)  --    Characteristics  dry;flat  -XC  dry;flat  -XC  --    Color  red  -XC  red  -XC  --    Lesion Size  greater than 1 cm  -XC  greater than 1 cm  -LB (r) XC (t) LB (c)  --    Care, Rash  open to air  -XC   open to air  -LB (r) XC (t) LB (c)  --    Retired Rash - Properties Group Date first assessed: 12/31/20  -CS Time first assessed: 0750  -CS Orientation: medial  -CS Location: abdomen  -CS Type: other (see comments)  -CS Additional Comments: red pinpoint  -CS       Rash 01/01/21 0751 Left calf patch    Rash - Properties Group Placement Date: 01/01/21  -CS Placement Time: 0751  -CS Side: Left  -CS Location: calf  -CS Type: patch  -CS    Distribution  localized  -XC  localized  -LB (r) XC (t) LB (c)  --    Configuration/Shape  round  -XC  round  -LB (r) XC (t) LB (c)  --    Borders  irregular  -XC  irregular  -LB (r) XC (t) LB (c)  --    Characteristics  dry;flat  -XC  dry;flat  -XC  --    Color  color consistent with skin  -XC  red  -LB (r) XC (t) LB (c)  --    Lesion Size  --  greater than 1 cm  -LB (r) XC (t) LB (c)  --    Care, Rash  open to air  -XC  open to air  -LB (r) XC (t) LB (c)  --    Retired Rash - Properties Group Date first assessed: 01/01/21  -CS Time first assessed: 0751  -CS Side: Left  -CS Location: calf  -CS Type: patch  -CS       Rash 01/01/21 0751 Right calf patch    Rash - Properties Group Placement Date: 01/01/21  -CS Placement Time: 0751  -CS Side: Right  -CS Location: calf  -CS Type: patch  -CS    Distribution  localized  -XC  localized  -LB (r) XC (t) LB (c)  --    Configuration/Shape  round  -XC  round  -LB (r) XC (t) LB (c)  --    Borders  irregular  -XC  irregular  -LB (r) XC (t) LB (c)  --    Characteristics  dry;flat  -XC  dry;flat  -XC  --    Color  color consistent with skin  -XC  red  -LB (r) XC (t) LB (c)  --    Lesion Size  --  greater than 1 cm  -LB (r) XC (t) LB (c)  --    Care, Rash  open to air  -XC  open to air  -LB (r) XC (t) LB (c)  --    Retired Rash - Properties Group Date first assessed: 01/01/21  -CS Time first assessed: 0751  -CS Side: Right  -CS Location: calf  -CS Type: patch  -CS    Row Name 01/05/21 0720 01/05/21 0315 01/04/21 2305       Wound 12/24/20 2219 Left  anterior shoulder Incision    Wound - Properties Group Placement Date: 12/24/20  -PHILLIP Placement Time: 2219  -PHILLIP Present on Hospital Admission: Y  -PHILLIP Side: Left  -PHILLIP Orientation: anterior  -PHILLIP Location: shoulder  -PHILLIP Primary Wound Type: Incision  -PHILLIP    Dressing Appearance  open to air  -LB (r) XC (t) LB (c)  dry;intact;no drainage  -AB  dry;intact;no drainage  -AB    Closure  Staples  -LB (r) XC (t) LB (c)  NATHANAEL  -AB  NATHANAEL  -AB    Base  clean;dry  -LB (r) XC (t) LB (c)  dressing in place, unable to visualize  -AB  dressing in place, unable to visualize  -AB    Periwound  intact;dry  -LB (r) XC (t) LB (c)  --  --    Drainage Amount  --  none  -AB  none  -AB    Retired Wound - Properties Group Date first assessed: 12/24/20  -PHILLIP Time first assessed: 2219  -PHILLIP Present on Hospital Admission: Y  -PHILLIP Side: Left  -PHILLIP Location: shoulder  -PHILLIP Primary Wound Type: Incision  -PHILLIP       Rash 12/31/20 0750 medial abdomen other (see comments)    Rash - Properties Group Placement Date: 12/31/20  -CS Placement Time: 0750  -CS Orientation: medial  -CS Location: abdomen  -CS Type: other (see comments)  -CS Additional Comments: red pinpoint  -CS    Distribution  localized  -LB (r) XC (t) LB (c)  localized  -AB  localized  -AB    Configuration/Shape  oval  -LB (r) XC (t) LB (c)  oval  -AB  oval  -AB    Borders  irregular  -LB (r) XC (t) LB (c)  irregular  -AB  irregular  -AB    Characteristics  dry;flat  -XC  dry;flat bruised  -AB  dry;flat bruised  -AB    Color  red  -XC  purple;red  -AB  purple;red  -AB    Lesion Size  greater than 1 cm  -LB (r) XC (t) LB (c)  greater than 1 cm  -AB  greater than 1 cm  -AB    Care, Rash  open to air  -LB (r) XC (t) LB (c)  open to air  -AB  open to air  -AB    Retired Rash - Properties Group Date first assessed: 12/31/20  -CS Time first assessed: 0750  -CS Orientation: medial  -CS Location: abdomen  -CS Type: other (see comments)  -CS Additional Comments: red pinpoint  -CS       Rash 01/01/21 0751 Left  calf patch    Rash - Properties Group Placement Date: 01/01/21  -CS Placement Time: 0751  -CS Side: Left  -CS Location: calf  -CS Type: patch  -CS    Distribution  localized  -LB (r) XC (t) LB (c)  localized  -AB  localized  -AB    Configuration/Shape  round  -LB (r) XC (t) LB (c)  round  -AB  round  -AB    Borders  irregular  -LB (r) XC (t) LB (c)  irregular  -AB  irregular  -AB    Characteristics  dry;flat  -XC  dry;flat bruised  -AB  dry;flat bruising  -AB    Color  red  -LB (r) XC (t) LB (c)  red  -AB  red  -AB    Lesion Size  greater than 1 cm  -LB (r) XC (t) LB (c)  greater than 1 cm  -AB  greater than 1 cm  -AB    Care, Rash  open to air  -LB (r) XC (t) LB (c)  open to air  -AB  open to air  -AB    Retired Rash - Properties Group Date first assessed: 01/01/21  - Time first assessed: [facility]  -CS Side: Left  -CS Location: calf  -CS Type: patch  -CS       Rash 01/01/21 [facility] Right calf patch    Rash - Properties Group Placement Date: 01/01/21  -CS Placement Time: 0751  -CS Side: Right  -CS Location: calf  -CS Type: patch  -CS    Distribution  localized  -LB (r) XC (t) LB (c)  localized  -AB  localized  -AB    Configuration/Shape  round  -LB (r) XC (t) LB (c)  round  -AB  round  -AB    Borders  irregular  -LB (r) XC (t) LB (c)  irregular  -AB  irregular  -AB    Characteristics  dry;flat  -XC  dry;flat bruised  -AB  dry;flat bruised  -AB    Color  red  -LB (r) XC (t) LB (c)  red  -AB  red  -AB    Lesion Size  greater than 1 cm  -LB (r) XC (t) LB (c)  greater than 1 cm  -AB  greater than 1 cm  -AB    Care, Rash  open to air  -LB (r) XC (t) LB (c)  open to air  -AB  --    Retired Rash - Properties Group Date first assessed: 01/01/21  -CS Time first assessed: 0751  -CS Side: Right  -CS Location: calf  -CS Type: patch  -CS      User Key  (r) = Recorded By, (t) = Taken By, (c) = Cosigned By    Initials Name Provider Type    Lucina Figueroa, RN Registered Nurse    Antoinette Marie, LPN Licensed Nurse    LB  Kristine Bajwa, RN Registered Nurse    Leatha De Leon RN Registered Nurse    XC Miles Moeller RN Registered Nurse          Procedures:              Results Review:     I reviewed the patient's new clinical results.      Lab Results (last 24 hours)     Procedure Component Value Units Date/Time    C-reactive Protein [[id number]]  (Abnormal) Collected: 01/05/21 0437    Specimen: Blood Updated: 01/05/21 0625     C-Reactive Protein 3.81 mg/dL     Basic Metabolic Panel [[id number]]  (Abnormal) Collected: 01/05/21 0437    Specimen: Blood Updated: 01/05/21 0524     Glucose 127 mg/dL      BUN 10 mg/dL      Creatinine 1.66 mg/dL      Sodium 134 mmol/L      Potassium 4.1 mmol/L      Chloride 98 mmol/L      CO2 27.0 mmol/L      Calcium 8.5 mg/dL      eGFR Non African Amer 34 mL/min/1.73      BUN/Creatinine Ratio 6.0     Anion Gap 9.0 mmol/L     Narrative:      GFR Normal >60  Chronic Kidney Disease <60  Kidney Failure <15      Magnesium [[id number]]  (Normal) Collected: 01/05/21 0437    Specimen: Blood Updated: 01/05/21 0524     Magnesium 1.9 mg/dL     Phosphorus [[id number]]  (Normal) Collected: 01/05/21 0437    Specimen: Blood Updated: 01/05/21 0524     Phosphorus 3.9 mg/dL     CK [[id number]]  (Normal) Collected: 01/05/21 0437    Specimen: Blood Updated: 01/05/21 0524     Creatine Kinase 45 U/L     CBC (No Diff) [[id number]]  (Abnormal) Collected: 01/05/21 0437    Specimen: Blood Updated: 01/05/21 0458     WBC 6.50 10*3/mm3      RBC 3.35 10*6/mm3      Hemoglobin 10.2 g/dL      Hematocrit 31.4 %      MCV 93.9 fL      MCH 30.4 pg      MCHC 32.4 g/dL      RDW 16.7 %      RDW-SD 54.7 fl      MPV 7.8 fL      Platelets 256 10*3/mm3     Blood Culture - Blood, Arm, Right [[id number]] Collected: 01/01/21 0021    Specimen: Blood from Arm, Right Updated: 01/05/21 0030     Blood Culture No growth at 4 days    Blood Culture - Blood, Arm, Right [[id number]] Collected: 12/31/20 2339    Specimen: Blood from Arm, Right Updated:  01/04/21 2345     Blood Culture No growth at 4 days        No results found for: HGBA1C            No results found for: LIPASE  No results found for: CHOL, CHLPL, TRIG, HDL, LDL, LDLDIRECT    No results found for: INTRAOP, PREDX, FINALDX, COMDX    Microbiology Results (last 10 days)     Procedure Component Value - Date/Time    Urine Culture - Urine, Urine, Clean Catch [[id number]]  (Normal) Collected: 01/01/21 1611    Lab Status: Final result Specimen: Urine, Clean Catch Updated: 01/02/21 1537     Urine Culture No growth    Respiratory Panel PCR w/COVID-19(SARS-CoV-2) CARRIE/BARRETT/KANDY/PAD/COR/MAD/MAXIMILIANO In-House, NP Swab in UTM/VTM, 3-4 HR TAT - Swab, Nasopharynx [[id number]]  (Normal) Collected: 01/01/21 0143    Lab Status: Final result Specimen: Swab from Nasopharynx Updated: 01/01/21 0244     ADENOVIRUS, PCR Not Detected     Coronavirus 229E Not Detected     Coronavirus HKU1 Not Detected     Coronavirus NL63 Not Detected     Coronavirus OC43 Not Detected     COVID19 Not Detected     Human Metapneumovirus Not Detected     Human Rhinovirus/Enterovirus Not Detected     Influenza A PCR Not Detected     Influenza B PCR Not Detected     Parainfluenza Virus 1 Not Detected     Parainfluenza Virus 2 Not Detected     Parainfluenza Virus 3 Not Detected     Parainfluenza Virus 4 Not Detected     RSV, PCR Not Detected     Bordetella pertussis pcr Not Detected     Bordetella parapertussis PCR Not Detected     Chlamydophila pneumoniae PCR Not Detected     Mycoplasma pneumo by PCR Not Detected    Narrative:      Fact sheet for providers: https://docs.[employer]/wp-content/uploads/TSD5748-3781-FF2.1-EUA-Provider-Fact-Sheet-3.pdf    Fact sheet for patients: https://docs.[employer]/wp-content/uploads/IES4636-2656-IJ0.1-EUA-Patient-Fact-Sheet-1.pdf    Test performed by PCR.    Blood Culture - Blood, Arm, Right [[id number]] Collected: 01/01/21 0021    Lab Status: Preliminary result Specimen: Blood from Arm, Right Updated: 01/05/21 0030      Blood Culture No growth at 4 days    Blood Culture - Blood, Arm, Right [[id number]] Collected: 12/31/20 2339    Lab Status: Preliminary result Specimen: Blood from Arm, Right Updated: 01/04/21 2345     Blood Culture No growth at 4 days          ECG/EMG Results (most recent)     Procedure Component Value Units Date/Time    ECG 12 Lead [[id number]] Collected: 12/31/20 2014     Updated: 01/02/21 0823     QT Interval 369 ms     Narrative:      HEART RATE= 106  bpm  RR Interval= 568  ms  MN Interval= 111  ms  P Horizontal Axis= -26  deg  P Front Axis= 53  deg  QRSD Interval= 73  ms  QT Interval= 369  ms  QRS Axis= 73  deg  T Wave Axis= 8  deg  - ABNORMAL ECG -  Sinus tachycardia  Nonspecific T abnormalities, diffuse leads  When compared with ECG of 22-Dec-2020 19:47:41,  No significant change  Electronically Signed By: Merritt HodgsonKANDY) 02-Jan-2021 08:22:08  Date and Time of Study: 2020-12-31 20:14:42    Adult Transthoracic Echo Limited W/ Cont if Necessary Per Protocol [[id number]] Collected: 01/03/21 0848     Updated: 01/03/21 1314     BSA 2.1 m^2      TR max peyton 364.6 cm/sec      RVSP(TR) 61.2 mmHg      RAP systole 8.0 mmHg       CV ECHO ANGI - BZI_BMI 37.4 kilograms/m^2       CV ECHO ANGI - BSA(HAYCOCK) 2.2 m^2       CV ECHO ANGI - BZI_METRIC_WEIGHT 102.1 kg       CV ECHO ANGI - BZI_METRIC_HEIGHT 165.1 cm      Echo EF Estimated 60 %     Narrative:      · Estimated left ventricular EF = 60% Left ventricular systolic function   is normal.     Indications  Shortness of breath    Technically satisfactory study.  Mitral valve is structurally normal.  Tricuspid valve is structurally normal.  Moderate tricuspid regurgitation.  Aortic valve is structurally normal.  Pulmonic valve could not be well visualized.  No evidence for mitral or aortic regurgitation is seen by Doppler study.  Left atrium is normal in size.  Right atrium is enlarged  Left ventricle is normal in size and contractility with ejection fraction    of 60%.  Right ventricle is enlarged.  Atrial septum is intact.  Aorta is normal.  No pericardial effusion or intracardiac thrombus is seen.    Impression  Moderate tricuspid regurgitation.  Right atrium right ventricle enlargement.  Otherwise structurally and functionally normal cardiac valves.  Left ventricular size and contractility is normal with ejection fraction   of 60%.  No pericardial effusion is present.          Results for orders placed during the hospital encounter of 12/31/20   Duplex Venous Upper Extremity - Left CAR    Narrative · Acute left upper extremity superficial thrombophlebitis noted in the   basilic (upper arm), basilic (forearm), cephalic (upper arm), cephalic   (forearm) and median cubital.  · All other left sided vessels appear normal.          Results for orders placed during the hospital encounter of 12/31/20   Adult Transthoracic Echo Limited W/ Cont if Necessary Per Protocol    Narrative · Estimated left ventricular EF = 60% Left ventricular systolic function   is normal.     Indications  Shortness of breath    Technically satisfactory study.  Mitral valve is structurally normal.  Tricuspid valve is structurally normal.  Moderate tricuspid regurgitation.  Aortic valve is structurally normal.  Pulmonic valve could not be well visualized.  No evidence for mitral or aortic regurgitation is seen by Doppler study.  Left atrium is normal in size.  Right atrium is enlarged  Left ventricle is normal in size and contractility with ejection fraction   of 60%.  Right ventricle is enlarged.  Atrial septum is intact.  Aorta is normal.  No pericardial effusion or intracardiac thrombus is seen.    Impression  Moderate tricuspid regurgitation.  Right atrium right ventricle enlargement.  Otherwise structurally and functionally normal cardiac valves.  Left ventricular size and contractility is normal with ejection fraction   of 60%.  No pericardial effusion is present.       Xr Chest 1 View    Result  Date: 12/31/2020   1. Cardiomegaly. 2. Interval development of right basilar consolidation felt to represent either pneumonia or atelectasis with a small pleural effusion.  Electronically Signed By-Raul Valdes MD On:12/31/2020 6:53 PM This report was finalized on [id number] by  Raul Valdes MD.    Ct Chest Pulmonary Embolism    Result Date: 12/31/2020  1. Large consolidation in the right lower lobe. This likely represents a combination of atelectasis and consolidative right lower lobe pneumonia. There is an additional consolidation in the right middle lobe also likely representing atelectasis and consolidative pneumonia. Continuous radiographic follow-up to complete resolution is recommended. 2. There is a moderately sized right-sided pleural effusion. There is a trace left-sided pleural effusion. There is an element of early pulmonary edema. 3. Subtle reticulonodular type infiltrate in the anterior aspect of the right upper lobe likely representing mild infectious bronchopneumonia. 4. Hepatomegaly with hepatic steatosis. 5. Emphysema. 6. Body wall edema. 7. No definitive pulmonary embolus. Slot 63 Electronically signed by:  Marvin Martinez M.D.  12/31/2020 8:59 PM          Xrays, labs reviewed personally by physician.    Medication Review:   I have reviewed the patient's current medication list      Scheduled Meds  aspirin, 325 mg, Oral, Daily  bumetanide, 1 mg, Oral, BID  DAPTOmycin, 6 mg/kg (Adjusted), Intravenous, Q24H  Linezolid, 600 mg, Intravenous, Q12H  sodium chloride, 10 mL, Intravenous, Q12H        Meds Infusions       Meds PRN  •  acetaminophen **OR** acetaminophen **OR** acetaminophen  •  calcium carbonate  •  diphenhydrAMINE  •  diphenhydrAMINE-zinc acetate  •  ondansetron **OR** ondansetron  •  oxyCODONE  •  sodium chloride        Assessment/Plan   Assessment/Plan     Active Hospital Problems    Diagnosis  POA   • Congestive heart failure (CMS/HCC) [I50.9]  Yes      Resolved Hospital Problems   No  resolved problems to display.       MEDICAL DECISION MAKING COMPLEXITY BY PROBLEM:     Sepsis multifactorial secondary to pneumonia, MSSA bacteremia, and left shoulder septic joint     Healthcare facility acquired pneumonia, right lower and middle lobe--recent hospitalization x1 week  Respiratory panel including Covid 1/1/2021 -was negative  Blood cultures negative this hospitalization so far  Patient had 2D echo last admission  Consider MYLES  We will ask ID for evaluation  Initial antibiotics with cefepime and Vanco.  Now on Zyvox per ID  Echo 1/3/2020 shows moderate tricuspid regurgitation  Pulmonic valve could not be visualized  EF 60%-Right ventricle is enlarged-No pericardial effusion    Methicillin susceptible Staphylococcus aureus bacteremia and IV drug user.    Most likely secondary to left shoulder infection and left Acromioclavicular joint infection  2D echo showed no signs of vegetation.  Cardio service are comfortable reading the 2D echo alone since it was good imaging study.  -Previous plan Rocephin 2 g IV daily for 6 weeks  -Now switched to daptomycin  -On Zyvox given pneumonia as well per ID.    Left upper extremity edema  Doppler: No DVT, however superficial thrombophlebitis noted.   Acute left upper extremity superficial thrombophlebitis noted in the basilic (upper arm), basilic (forearm), cephalic (upper arm), cephalic (forearm) and median cubital. Improved.  Has good pulsations.  Elevation and supportive care recommended  Improving    Acute kidney injury  Could be cardiorenal  Nephrologist following  Could be related to vasculitis as well    Septic left shoulder joint infection on previous admission:  MSSA of the left shoulder with osteomyelitis, stable on outpatient antibiotics:  Antibiotics as above    Suspect right-sided heart failure  Elevated BNP with recent echocardiogram December 2020, showing EF 65% with pulmonary hypertension and moderate tricuspid regurgitation:   Agree with diuresis as  tolerated    Rash-  Could be vasculitic  Etiology not clear    Normocytic anemia likely of chronic disease  We will monitor  Anemia panel ordered    Patient is high risk.        VTE Prophylaxis -   Mechanical Order History:     None      Pharmalogical Order History:      Ordered     Dose Route Frequency Stop    12/31/20 2348  enoxaparin (LOVENOX) syringe 40 mg  Status:  Discontinued      40 mg SC Daily 01/01/21 0215    01/01/21 0215  enoxaparin (LOVENOX) syringe 100 mg      1 mg/kg SC Once 01/01/21 0316                  Code Status -   Code Status and Medical Interventions:   Ordered at: 12/31/20 2348     Code Status:    CPR     Medical Interventions (Level of Support Prior to Arrest):    Full       This patient has been examined wearing appropriate Personal Protective Equipment and discussed with hospital infection control department. 01/05/21        Discharge Planning  Pending clinical course.        Electronically signed by Magali Singh MD, 01/05/21, 19:51 EST.  [facility] Hospitalist Team

## 2021-01-06 NOTE — PLAN OF CARE
Goal Outcome Evaluation:  Plan of Care Reviewed With: patient  Progress: no change  Outcome Summary: pt had PICC placed today per orders. will d/c to home with PICC line for IV abx with [facility]. pain treated per MAR.

## 2021-01-06 NOTE — DISCHARGE PLACEMENT REQUEST
"Pj Sandoval (45 y.o. Female)     Date of Birth Social Security Number Address Home Phone MRN    1975  [address] [place] [address] [phone] [id number]    Gnosticism Marital Status          None        Admission Date Admission Type Admitting Provider Attending Provider Department, Room/Bed    12/31/20 Emergency Magali Singh MD Hall, Kelli G, MD [facility] SURGICAL INPATIENT, 4125/1    Discharge Date Discharge Disposition Discharge Destination                       Attending Provider: Magali Singh MD    Allergies: Rocephin [Ceftriaxone], Penicillins    Isolation: None   Infection: None   Code Status: CPR    Ht: 165.1 cm (65\")   Wt: 98.1 kg (216 lb 4.3 oz)    Admission Cmt: None   Principal Problem: None                Active Insurance as of 12/31/2020     Primary Coverage     Payor Plan Insurance Group Employer/Plan Group    [facility] [id number]      Payor Plan Address Payor Plan Phone Number Payor Plan Fax Number Effective Dates    [address]   12/1/2020 - None Entered    [facility] [phone]       Subscriber Name Subscriber Birth Date Member ID       PJ SANDOVAL 1975 [id number]                 Emergency Contacts      (Rel.) Home Phone Work Phone Mobile Phone    Aristeo Sandoval (Spouse) [phone] -- [phone]              "

## 2021-01-06 NOTE — PROGRESS NOTES
"  RENAL PROGRESS NOTE      Patient Care Team:  Danie Dee MD as PCP - General      Provider:  Maicol Anderson MD  Patient Name: Radha Curtis  :  1975    SUBJECTIVE:    F/U CHF/PELON    No new complaints    Medication:  aspirin, 325 mg, Oral, Daily  bumetanide, 1 mg, Oral, BID  DAPTOmycin, 6 mg/kg (Adjusted), Intravenous, Q24H  Linezolid, 600 mg, Intravenous, Q12H  nicotine, 1 patch, Transdermal, Nightly  sodium chloride, 10 mL, Intravenous, Q12H           OBJECTIVE    Vital Sign Min/Max for last 24 hours  Temp  Min: 97.6 °F (36.4 °C)  Max: 98.3 °F (36.8 °C)   BP  Min: 119/72  Max: 134/84   Pulse  Min: 93  Max: 97   Resp  Min: 14  Max: 18   SpO2  Min: 90 %  Max: 95 %   No data recorded   Weight  Min: 98.1 kg (216 lb 4.3 oz)  Max: 98.1 kg (216 lb 4.3 oz)     Flowsheet Rows      First Filed Value   Admission Height  165.1 cm (65\") Documented at 2020 1712   Admission Weight  101 kg (222 lb 14.2 oz) Documented at 2020 1712          No intake/output data recorded.  I/O last 3 completed shifts:  In: 1688 [P.O.:1080; I.V.:608]  Out: 1200 [Urine:1200]    Physical Exam:  General Appearance: alert, appears stated age and cooperative  Head: normocephalic, without obvious abnormality and atraumatic  Eyes: conjunctivae and sclerae normal and no icterus  Neck: supple and no JVD  Lungs: clear to auscultation and respirations regular  Heart: regular rhythm & normal rate and normal S1, S2 +ERENDIRA  Chest: Wall no abnormalities observed  Abdomen: normal bowel sounds and soft non-tender +OBESITY  Extremities: moves extremities well, no edema, no cyanosis and no redness  Skin: no bleeding, bruising or rash, turgor normal, color normal and no lesions noted  Neurologic: Alert, and oriented. No focal deficits    Labs:    WBC WBC   Date Value Ref Range Status   2021 6.90 3.40 - 10.80 10*3/mm3 Final   2021 6.50 3.40 - 10.80 10*3/mm3 Final   2021 7.50 3.40 - 10.80 10*3/mm3 Final      HGB Hemoglobin   Date " Value Ref Range Status   01/06/2021 10.7 (L) 12.0 - 15.9 g/dL Final   01/05/2021 10.2 (L) 12.0 - 15.9 g/dL Final   01/04/2021 10.0 (L) 12.0 - 15.9 g/dL Final      HCT Hematocrit   Date Value Ref Range Status   01/06/2021 32.8 (L) 34.0 - 46.6 % Final   01/05/2021 31.4 (L) 34.0 - 46.6 % Final   01/04/2021 31.4 (L) 34.0 - 46.6 % Final      Platlets No results found for: LABPLAT   MCV MCV   Date Value Ref Range Status   01/06/2021 93.6 79.0 - 97.0 fL Final   01/05/2021 93.9 79.0 - 97.0 fL Final   01/04/2021 94.5 79.0 - 97.0 fL Final          Sodium Sodium   Date Value Ref Range Status   01/06/2021 134 (L) 136 - 145 mmol/L Final   01/05/2021 134 (L) 136 - 145 mmol/L Final   01/04/2021 132 (L) 136 - 145 mmol/L Final      Potassium Potassium   Date Value Ref Range Status   01/06/2021 4.0 3.5 - 5.2 mmol/L Final   01/05/2021 4.1 3.5 - 5.2 mmol/L Final   01/04/2021 4.0 3.5 - 5.2 mmol/L Final      Chloride Chloride   Date Value Ref Range Status   01/06/2021 97 (L) 98 - 107 mmol/L Final   01/05/2021 98 98 - 107 mmol/L Final   01/04/2021 99 98 - 107 mmol/L Final      CO2 CO2   Date Value Ref Range Status   01/06/2021 27.0 22.0 - 29.0 mmol/L Final   01/05/2021 27.0 22.0 - 29.0 mmol/L Final   01/04/2021 26.0 22.0 - 29.0 mmol/L Final      BUN BUN   Date Value Ref Range Status   01/06/2021 10 6 - 20 mg/dL Final   01/05/2021 10 6 - 20 mg/dL Final   01/04/2021 11 6 - 20 mg/dL Final      Creatinine Creatinine   Date Value Ref Range Status   01/06/2021 1.56 (H) 0.57 - 1.00 mg/dL Final   01/05/2021 1.66 (H) 0.57 - 1.00 mg/dL Final   01/04/2021 1.49 (H) 0.57 - 1.00 mg/dL Final      Calcium Calcium   Date Value Ref Range Status   01/06/2021 8.8 8.6 - 10.5 mg/dL Final   01/05/2021 8.5 (L) 8.6 - 10.5 mg/dL Final   01/04/2021 8.2 (L) 8.6 - 10.5 mg/dL Final      PO4 No components found for: PO4   Albumin Albumin   Date Value Ref Range Status   01/06/2021 2.60 (L) 3.50 - 5.20 g/dL Final   01/04/2021 2.50 (L) 3.50 - 5.20 g/dL Final       Magnesium Magnesium   Date Value Ref Range Status   01/05/2021 1.9 1.6 - 2.6 mg/dL Final   01/04/2021 2.1 1.6 - 2.6 mg/dL Final      Uric Acid No components found for: URIC ACID     Imaging Results (Last 72 Hours)     ** No results found for the last 72 hours. **          Results for orders placed during the hospital encounter of 12/31/20   XR Chest 1 View    Narrative DATE OF EXAM:  12/31/2020 6:44 PM     PROCEDURE:  XR CHEST 1 VW-     INDICATIONS:  Shortness of breath, lower extremity swelling, hypertension.      COMPARISON:  12/22/2020.     TECHNIQUE:   Single radiographic view of the chest was obtained.     FINDINGS:  The heart is enlarged. There is abnormal right basilar consolidation  felt to represent either pneumonia or atelectasis with a small pleural  effusion. The left lung and pleural space are clear. The pulmonary  vascular markings are normal.  The bony thorax is normal for age.       Impression    1. Cardiomegaly.  2. Interval development of right basilar consolidation felt to represent  either pneumonia or atelectasis with a small pleural effusion.     Electronically Signed By-Raul Valdes MD On:12/31/2020 6:53 PM  This report was finalized on [id number] by  Raul Valdes MD.       Results for orders placed during the hospital encounter of 12/31/20   Duplex Venous Upper Extremity - Left CAR    Narrative · Acute left upper extremity superficial thrombophlebitis noted in the   basilic (upper arm), basilic (forearm), cephalic (upper arm), cephalic   (forearm) and median cubital.  · All other left sided vessels appear normal.            ASSESSMENT / PLAN      Congestive heart failure (CMS/HCC)      Acute kidney injury with stable BUN/Cr. Acid-base, lytes okay.  Volume excess improving. Stop IV Lasix and put on po Bumex.  Hyponatremia----Stable with diuresis  Pneumonia/Sepsis clinically improved.  MSSA bacteremia from the left shoulder with osteomyelitis----Follow CK as patient on Daptomycin  Anemia  with stable H/H.  HTN with stable BP.  Mild Hypomagnesemia----Replaced  Hypocalcemia to be replaced IV  Sepsis per primary team       watch CPK while on daptomycin  Creatinine is within the range   Correct electrolytes  PICC line benito Anderson MD

## 2021-01-06 NOTE — PLAN OF CARE
Goal Outcome Evaluation:  Plan of Care Reviewed With: patient  Progress: no change  Outcome Summary: Patient's pain has been treated with PRN oral pain medication. She has had some nausea that was treated with Zofran. Patient has been resting between care. NPO since midnight. Patient is expected to have a MYLES performed this AM. Will continue to monitor.

## 2021-01-07 ENCOUNTER — READMISSION MANAGEMENT (OUTPATIENT)
Dept: CALL CENTER | Facility: HOSPITAL | Age: 46
End: 2021-01-07

## 2021-01-07 NOTE — OUTREACH NOTE
Prep Survey      Responses   [facility] facility patient discharged from?  [facility]   Is LACE score < 7 ?  No   Emergency Room discharge w/ pulse ox?  No   Eligibility  Readm Mgmt   Discharge diagnosis  Pneumonia, severe sepsis, left shoulder septic joint, acute on chronic right systolic CHF, MSSA   Does the patient have one of the following disease processes/diagnoses(primary or secondary)?  Sepsis   Does the patient have Home health ordered?  No   Is there a DME ordered?  No   Comments regarding appointments  Needs f/u scheduled   Medication alerts for this patient  start aspirin   Prep survey completed?  Yes          Angela Cueva RN

## 2021-01-07 NOTE — PAYOR COMM NOTE
"DC notice for Radha Curtis  1975  Requesting final IP Determination   DC  to home with Home Health Services.  Pt admit  Obs status.  IP status beginning .    AUTHORIZATION PENDING  PLEASE FORWARD DETERMINATION TO FOLLOWING CONTACT:    MIGUELINA EVE [place] [facility]    [phone]    Radha Curtis (45 y.o. Female)     Date of Birth Social Security Number Address Home Phone MRN    1975  [address] [place] [address] [phone] [id number]    Yarsani Marital Status          None        Admission Date Admission Type Admitting Provider Attending Provider Department, Room/Bed    20 Emergency Magali Singh MD  [facility] SURGICAL INPATIENT,     Discharge Date Discharge Disposition Discharge Destination        2021 Home-Health Care c              Attending Provider: (none)   Allergies: Rocephin [Ceftriaxone], Penicillins    Isolation: None   Infection: None   Code Status: Prior    Ht: 165.1 cm (65\")   Wt: 98.1 kg (216 lb 4.3 oz)    Admission Cmt: None   Principal Problem: None                Active Insurance as of 2020     Primary Coverage     Payor Plan Insurance Group Employer/Plan Group    [id number]      Payor Plan Address Payor Plan Phone Number Payor Plan Fax Number Effective Dates    [address]   2020 - None Entered    [facility] [phone]       Subscriber Name Subscriber Birth Date Member ID       RADHA CURTIS 1975 [id number]                 Emergency Contacts      (Rel.) Home Phone Work Phone Mobile Phone    Aristeo Curtis (Spouse) [phone] -- [phone]               Discharge Summary      Magali Singh MD at 21 1705                [facility] Medicine Services  DISCHARGE SUMMARY        Prepared For PCP:  Danie Dee MD    Patient Name: Radha Curtis  : 1975  MRN: [id number]      Date of " Admission:   12/31/2020    Date of Discharge:  1/6/2021    Length of stay:  LOS: 2 days     Hospital Course     Presenting Problem:   Edema, unspecified type [R60.9]  Pneumonia due to infectious organism, unspecified laterality, unspecified part of lung [J18.9]  Congestive heart failure, unspecified HF chronicity, unspecified heart failure type (CMS/[facility]) [I50.9]  Congestive heart failure, unspecified HF chronicity, unspecified heart failure type (CMS/[facility]) [I50.9]      Active Hospital Problems    Diagnosis  POA   • Pneumonia due to infectious organism [J18.9]  Yes   • Congestive heart failure (CMS/[facility]) [I50.9]  Yes   • MSSA bacteremia [R78.81, B95.61]  Yes   • Severe sepsis (CMS/[facility]) [A41.9, R65.20]  Yes   • Depression [F32.9]  Yes   • Hypertension [I10]  Yes   • Type 2 diabetes mellitus without complications (CMS/HCC) [E11.9]  Yes   • Drug abuse, IV (CMS/[facility]) [F19.10]  Yes   • Septic arthritis of AC joint (CMS/[facility]) [M00.9]  Yes   • Anxiety [F41.9]  Yes   • Hyperlipidemia [E78.5]  Yes   • Tobacco use [Z72.0]  Yes   • Restless leg syndrome [G25.81]  Yes      Resolved Hospital Problems   No resolved problems to display.     Assessment and plan:    Sepsis multifactorial secondary to pneumonia, MSSA bacteremia, and left shoulder septic joint      Healthcare facility acquired pneumonia, right lower and middle lobe status post recent hospitalization  -Respiratory panel including Covid 1/1/2021 was negative  -Patient is on daptomycin and Zyvox     Acute on chronic right systolic congestive heart failure secondary to pulmonary hypertension  -proBNP 8937, 7980  -Echo 1/3/2020 shows moderate tricuspid regurgitation  Pulmonic valve could not be visualized  EF 60%-Right ventricle is enlarged-No pericardial effusion  RVSP 61.2 mmHg (increased from 49.1 mmHg on 12/23/2020)     Methicillin susceptible Staphylococcus aureus bacteremia due to IV drug abuse, possible bacterial endocarditis  -2D transthoracic echo showed no signs of  vegetation.  Per cardio service are comfortable reading the transthoracic 2D echo since it was good imaging study.  The patient is also declining MYLES if is not absolutely necessary.     Left shoulder infection with left acromioclavicular septic joint   -Status post surgical washout during recent hospital stay  -Had been on IV antibiotics prior to admission:     --Previous plan Rocephin 2 g IV daily for 6 weeks, however the patient developed a drug eruption     --Now switched to daptomycin     --On Zyvox given pneumonia as well per ID     Left upper extremity edema secondary to SVTs, improving  -Doppler: No DVT, however superficial thrombophlebitis noted.   -Elevation and supportive care recommended     Acute kidney injury possibly cardiorenal versus due to vasculitis; other etiologies not excluded  -Nephrologist following     Purpuric rash on bilateral lower legs that patient complains are itching and burning  -Started 12/31/2020 after patient received IV Rocephin in ambulatory care clinic  -Likely reaction to antibiotics/ceftriaxone  -Improved significantly     Normocytic anemia likely of chronic disease  -Hemoglobin has remained stable         Hospital Course:  Radha Curtis is a 45 y.o. femalepresents to the ER with chief complaint of swelling to her left upper extremity and bilateral lower extremities as well as rash to her bilateral lower extremities which started today.  The patient has had cough without sputum production.  She denies subjective fever or chills.  The patient adamantly denies IV drug use although it is documented in review of records.  The patient states she does not know how that ended up in her records.  She reiterates that she has not ever, not even once, used IV drugs.       Recent hospital admission 12/22/2020 through 12/29/2020 for severe sepsis, UTI, hyponatremia.  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  Drain was left in and removed on 12/29/2020.   MSSA culture with recommendation for Rocephin x6 weeks.  Patient had MYLES 12/22/2020 showing LV function 65%, moderate TR, moderate pulmonary hypertension.  No vegetations seen.  Status post MRI of shoulder 12/24/2020 showing osteomyelitis.  Edema versus abscess.      Date:  1/1/21  Hurting and crying from lt ue pain  Pending v doppler ule  Resumed diuretics, added pain meds  Factious diseases and nephrology were consulted     1/2/21  Swelling left upper extremity improved  Venous Doppler left upper extremity showed superficial thrombophlebitis without DVT  Rash discussed with Dr. Pruitt   Will repeat 2D echo and may need MYLES     1/3/2021  She said she doesn't like the bed and wants to go home  Denies new events or symptoms  CRP slowly trending down  Has been afebrile but had mild tachycardia     1/4/2021  The patient denies specific complaints.  She expressed frustration with her ongoing health problems.  She had questions about needing the MYLES and all questions seem to be answered to her satisfaction.  I discussed the case with FAITH VAZQUEZ.     1/5/2021: Pt c/o frustration at how long she is taking to get better. She c/o not being told about her ongoing diagnoses although she has been told multiple times.      1/6/2021:   Patient complains of ongoing left shoulder pain.  She is anxious to go home.  I spoke with Dr. Rodriguez cardiology.  The patient is not wanting the MYLES and Dr. Rodriguez felt that since she is going to be treated with IV antibiotics for 6 weeks anyway that it was not absolutely necessary that it be done at this time.     The patient was felt to be stable for discharge.          Day of Discharge     Vital Signs:   Temp:  [97.6 °F (36.4 °C)-99.4 °F (37.4 °C)] 99.4 °F (37.4 °C)  Heart Rate:  [] 100  Resp:  [16-18] 18  BP: (134-144)/(84-90) 144/90     Physical Exam:  Physical Exam   Nurses notes and vital signs reviewed.  Well-developed over-nourished female in no acute distress sitting up in bed awake and  alert; mucous membranes moist; sclerae anicteric; lungs clear to auscultation bilaterally; CV regular rate and rhythm; abdomen soft nontender nondistended; extremities with no edema, cyanosis or calf tenderness; no Guerrero catheter.    Pertinent  and/or Most Recent Results     Results from last 7 days   Lab Units 01/06/21 0222 01/05/21  0437 01/04/21  0514 01/03/21 0319 01/02/21 0306 01/01/21 0212 12/31/20  2142   WBC 10*3/mm3 6.90 6.50 7.50 6.90 7.30 7.10 8.00   HEMOGLOBIN g/dL 10.7* 10.2* 10.0* 10.5* 10.6* 10.2* 11.2*   HEMATOCRIT % 32.8* 31.4* 31.4* 32.5* 32.1* 31.0* 34.2   PLATELETS 10*3/mm3 308 256 261 295 267 299 337   SODIUM mmol/L 134* 134* 132* 133* 134* 135* 135*   POTASSIUM mmol/L 4.0 4.1 4.0 3.9 4.0 4.1 4.0   CHLORIDE mmol/L 97* 98 99 98 101 101 101   CO2 mmol/L 27.0 27.0 26.0 24.0 24.0 25.0 25.0   BUN mg/dL 10 10 11 12 14 15 16   CREATININE mg/dL 1.56* 1.66* 1.49* 1.41* 1.43* 1.42* 1.40*   GLUCOSE mg/dL 101* 127* 104* 119* 95 102* 104*   CALCIUM mg/dL 8.8 8.5* 8.2* 8.4* 8.0* 8.1* 8.5*     Results from last 7 days   Lab Units 01/06/21 0222 01/04/21 0514 01/03/21 0319 01/02/21 0306 12/31/20  2142   BILIRUBIN mg/dL 0.4 0.4 0.5 0.4 0.5   ALK PHOS U/L 128* 122* 123* 117 136*   ALT (SGPT) U/L 12 17 17 18 26   AST (SGOT) U/L 14 22 17 25 25           Invalid input(s): TG, LDLCALC, LDLREALC  Results from last 7 days   Lab Units 01/01/21  0212 01/01/21  0146 12/31/20  2142   TSH uIU/mL 1.900  --   --    PROBNP pg/mL 7,980.0*  --  8,937.0*   TROPONIN T ng/mL <0.010  --  <0.010   PROCALCITONIN ng/mL  --   --  0.25   LACTATE mmol/L  --  0.9  --        Brief Urine Lab Results  (Last result in the past 365 days)      Color   Clarity   Blood   Leuk Est   Nitrite   Protein   CREAT   Urine HCG        01/01/21 1611 Yellow Cloudy  Comment:  Result checked  Large (3+) Small (1+) Negative 30 mg/dL (1+)               Microbiology Results Abnormal     Procedure Component Value - Date/Time    Blood Culture - Blood, Arm,  Right [[id number]] Collected: 01/01/21 0021    Lab Status: Final result Specimen: Blood from Arm, Right Updated: 01/06/21 0030     Blood Culture No growth at 5 days    Blood Culture - Blood, Arm, Right [[id number]] Collected: 12/31/20 2339    Lab Status: Final result Specimen: Blood from Arm, Right Updated: 01/05/21 2345     Blood Culture No growth at 5 days    Urine Culture - Urine, Urine, Clean Catch [[id number]]  (Normal) Collected: 01/01/21 1611    Lab Status: Final result Specimen: Urine, Clean Catch Updated: 01/02/21 1537     Urine Culture No growth    Respiratory Panel PCR w/COVID-19(SARS-CoV-2) CARRIE/BARRETT/KANDY/PAD/COR/MAD/MAXIMILIANO In-House, NP Swab in UTM/VTM, 3-4 HR TAT - Swab, Nasopharynx [[id number]]  (Normal) Collected: 01/01/21 0143    Lab Status: Final result Specimen: Swab from Nasopharynx Updated: 01/01/21 0244     ADENOVIRUS, PCR Not Detected     Coronavirus 229E Not Detected     Coronavirus HKU1 Not Detected     Coronavirus NL63 Not Detected     Coronavirus OC43 Not Detected     COVID19 Not Detected     Human Metapneumovirus Not Detected     Human Rhinovirus/Enterovirus Not Detected     Influenza A PCR Not Detected     Influenza B PCR Not Detected     Parainfluenza Virus 1 Not Detected     Parainfluenza Virus 2 Not Detected     Parainfluenza Virus 3 Not Detected     Parainfluenza Virus 4 Not Detected     RSV, PCR Not Detected     Bordetella pertussis pcr Not Detected     Bordetella parapertussis PCR Not Detected     Chlamydophila pneumoniae PCR Not Detected     Mycoplasma pneumo by PCR Not Detected    Narrative:      Fact sheet for providers: https://docs.[employer]/wp-content/uploads/RPA4850-7596-BL5.1-EUA-Provider-Fact-Sheet-3.pdf    Fact sheet for patients: https://docs.[employer]/wp-content/uploads/QKZ3033-4855-ZK8.1-EUA-Patient-Fact-Sheet-1.pdf    Test performed by PCR.          Xr Shoulder 2+ View Left    Result Date: 12/22/2020  Impression: No acute osseous abnormality.  Electronically Signed  By-Ho Urban MD On:12/22/2020 7:52 PM This report was finalized on [id number] by  Ho Urban MD.    Xr Chest 1 View    Result Date: 12/31/2020  Impression:  1. Cardiomegaly. 2. Interval development of right basilar consolidation felt to represent either pneumonia or atelectasis with a small pleural effusion.  Electronically Signed By-Raul Valdes MD On:12/31/2020 6:53 PM This report was finalized on [id number] by  Raul Valdes MD.    Xr Chest 1 View    Result Date: 12/22/2020  Impression: No acute cardiopulmonary process.  Electronically Signed By-Ho Urban MD On:12/22/2020 7:52 PM This report was finalized on [id number] by  Ho Urban MD.    Ct Chest Pulmonary Embolism    Result Date: 12/31/2020  Impression: 1. Large consolidation in the right lower lobe. This likely represents a combination of atelectasis and consolidative right lower lobe pneumonia. There is an additional consolidation in the right middle lobe also likely representing atelectasis and consolidative pneumonia. Continuous radiographic follow-up to complete resolution is recommended. 2. There is a moderately sized right-sided pleural effusion. There is a trace left-sided pleural effusion. There is an element of early pulmonary edema. 3. Subtle reticulonodular type infiltrate in the anterior aspect of the right upper lobe likely representing mild infectious bronchopneumonia. 4. Hepatomegaly with hepatic steatosis. 5. Emphysema. 6. Body wall edema. 7. No definitive pulmonary embolus. Slot 63 Electronically signed by:  Marvni Martinez M.D.  12/31/2020 8:59 PM    Mri Shoulder Left With & Without Contrast    Result Date: 12/24/2020  Impression: 1.Evidence for changes of septic arthropathy involving the left acromioclavicular joint. There is evidence for associated osteomyelitis involving the adjacent clavicle and acromion. 2.There is extensive edema and enhancement throughout the surrounding soft tissues overlying the region of the left  clavicle and left shoulder. These findings indicate extensive soft tissue infection throughout this region. There is evidence for spread infection to involve the soft tissues at the base of the left neck as well as at the anterior left lateral aspect of the thoracic inlet. 3.Evidence for developing myonecrosis and possible deep muscular soft tissue abscess along the superior margin of the trapezius musculature. This area of myonecrosis and possible developing abscess measures approximately 10 cm x 1.5 cm in axial dimensions. Electronically Signed: Sohan Eng MD 12/24/2020 13:41 EST    Ir Inject/asp Large Joint Or Bursa    Result Date: 12/24/2020  Impression: Technically successful fluoroscopically guided 22-gauge left shoulder joint aspiration, as described.  Electronically Signed By-Jeane Torres MD On:12/24/2020 10:25 AM This report was finalized on [id number] by  Jeane Torres MD.      Results for orders placed during the hospital encounter of 12/31/20   Duplex Venous Upper Extremity - Left CAR    Narrative · Acute left upper extremity superficial thrombophlebitis noted in the   basilic (upper arm), basilic (forearm), cephalic (upper arm), cephalic   (forearm) and median cubital.  · All other left sided vessels appear normal.          Results for orders placed during the hospital encounter of 12/31/20   Duplex Venous Upper Extremity - Left CAR    Narrative · Acute left upper extremity superficial thrombophlebitis noted in the   basilic (upper arm), basilic (forearm), cephalic (upper arm), cephalic   (forearm) and median cubital.  · All other left sided vessels appear normal.          Results for orders placed during the hospital encounter of 12/31/20   Adult Transthoracic Echo Limited W/ Cont if Necessary Per Protocol    Narrative · Estimated left ventricular EF = 60% Left ventricular systolic function   is normal.     Indications  Shortness of breath    Technically satisfactory study.  Mitral valve is  structurally normal.  Tricuspid valve is structurally normal.  Moderate tricuspid regurgitation.  Aortic valve is structurally normal.  Pulmonic valve could not be well visualized.  No evidence for mitral or aortic regurgitation is seen by Doppler study.  Left atrium is normal in size.  Right atrium is enlarged  Left ventricle is normal in size and contractility with ejection fraction   of 60%.  Right ventricle is enlarged.  Atrial septum is intact.  Aorta is normal.  No pericardial effusion or intracardiac thrombus is seen.    Impression  Moderate tricuspid regurgitation.  Right atrium right ventricle enlargement.  Otherwise structurally and functionally normal cardiac valves.  Left ventricular size and contractility is normal with ejection fraction   of 60%.  No pericardial effusion is present.               Test Results Pending at Discharge        Procedures Performed           Consults:   Consults     Date and Time Order Name Status Description    1/4/2021 1214 Inpatient Cardiology Consult Completed     1/1/2021 1137 Inpatient Nephrology Consult Completed     1/1/2021 0934 Inpatient Infectious Diseases Consult Completed     1/1/2021 0933 Inpatient Nephrology Consult      12/31/2020 2337 Hospitalist (on-call MD unless specified) Completed     12/24/2020 1455 Inpatient Nephrology Consult Completed     12/23/2020 1609 Inpatient Cardiology Consult Completed     12/23/2020 1208 Inpatient Infectious Diseases Consult Completed     12/23/2020 1208 Inpatient Orthopedic Surgery Consult Completed     12/22/2020 2017 Hospitalist (on-call MD unless specified) Completed             Discharge Details        Discharge Medications      New Medications      Instructions Start Date   aspirin  MG tablet   325 mg, Oral, Daily   Start Date: January 7, 2021     bumetanide 1 MG tablet  Commonly known as: BUMEX   1 mg, Oral, 2 Times Daily      DAPTOmycin 450 mg in sodium chloride 0.9 % 50 mL   6 mg/kg (450 mg), Intravenous, Every  24 Hours      doxycycline 100 MG capsule  Commonly known as: MONODOX   100 mg, Oral, 2 Times Daily      nicotine 21 MG/24HR patch  Commonly known as: NICODERM CQ   1 patch, Transdermal, Nightly      oxyCODONE 5 MG immediate release tablet  Commonly known as: Roxicodone   5 mg, Oral, Every 4 Hours PRN         Stop These Medications    cefTRIAXone 2 g in sodium chloride 0.9 % 100 mL IVPB     miconazole 2 % vaginal cream  Commonly known as: MICOTIN            Allergies   Allergen Reactions   • Rocephin [Ceftriaxone] Rash   • Penicillins Hives         Discharge Disposition:  Home-Health Care [facility]    Diet:  Hospital:  Diet Order   Procedures   • Diet Cardiac; Healthy Heart         Discharge Activity:   Activity Instructions     Activity as Tolerated              CODE STATUS:    Code Status and Medical Interventions:   Ordered at: 12/31/20 5068     Code Status:    CPR     Medical Interventions (Level of Support Prior to Arrest):    Full           Additional Instructions for the Follow-ups that You Need to Schedule     Call MD With Problems / Concerns   As directed      Instructions: Call [phone] or email [email or web address] for problems or concerns.    Order Comments: Instructions: Call [phone] or email [email or web address] for problems or concerns.          Discharge Follow-up with PCP   As directed       Currently Documented PCP:    Danie Dee MD    PCP Phone Number:    [phone]     Follow Up Details: 2 to 3 days via telehealth if possible                 Condition on Discharge:      Stable      This patient has been examined wearing appropriate Personal Protective Equipment. 01/06/21      Electronically signed by Magali Singh MD, 01/06/21, 5:05 PM EST.      Time: I spent 55 minutes on this discharge activity which included face-to-face encounter with the patient/reviewing the data in the system/coordination of the care with the nursing staff as well as consultants/documentation/entering  orders.      Electronically signed by Magali Singh MD at 01/06/21 1594

## 2021-01-07 NOTE — CONSULTS
picc team consult:    Procedure explained to patient and informed consent obtained.  accucath exchanged over wire to make SL picc line without difficulty.  MSBP utilized.  Dark venous blood return noted and line flushes without difficulty.

## 2021-01-07 NOTE — PROGRESS NOTES
Case Management Discharge Note      Final Note: home with [facility] to do labs and nursing for iv abx they are providing             Dialysis/Infusion Coordination complete    Service Provider Selected Services Address Phone Fax Patient Preferred    [facility]  Infusion and IV Therapy [id number] [address] [phone] --                       Final Discharge Disposition Code: 01 - home or self-care

## 2021-01-07 NOTE — NURSING NOTE
Patient's script for Oxycodone was [facility] to [facility]. Contacted [facility] to verify medication was received, but pharmacy stated the medication was out of stock. Dr. Singh was notified that pharmacy did not have medication in stock. Patient wanted medication sent to another [facility] that opens in the morning. Patient refused to wait for response from Dr. Singh before being discharged. I confirmed with [facility] in [place] that [facility] [place] [facility] the [facility] in [facility]. Dr. Singh is going to send the medication to [facility] in [place]. The medication order for the original [facility] in [place] has been cancelled with the pharmacist. Patient was notified via phone that the medication would be sent to [facility] in [place] to  in the morning. Voicemail was left on patient's phone.    Windy Mesa RN

## 2021-01-11 ENCOUNTER — APPOINTMENT (OUTPATIENT)
Dept: INFUSION THERAPY | Facility: HOSPITAL | Age: 46
End: 2021-01-11

## 2021-01-11 ENCOUNTER — READMISSION MANAGEMENT (OUTPATIENT)
Dept: CALL CENTER | Facility: HOSPITAL | Age: 46
End: 2021-01-11

## 2021-01-11 NOTE — OUTREACH NOTE
Sepsis Week 1 Survey      Responses   [facility] patient discharged from?  [facility]   Does the patient have one of the following disease processes/diagnoses(primary or secondary)?  Sepsis   Week 1 attempt successful?  Yes   Call start time  1815   Call end time  1827   Discharge diagnosis  Pneumonia, severe sepsis, left shoulder septic joint, acute on chronic right systolic CHF, MSSA   Medication alerts for this patient  start aspirin   Meds reviewed with patient/caregiver?  Yes   Is the patient having any side effects they believe may be caused by any medication additions or changes?  No   Does the patient have all medications related to this admission filled (includes all antibiotics, inhalers, nebulizers,steroids,etc.)  Yes   Is the patient taking all medications as directed (includes completed medication regime)?  Yes   Does the patient have a primary care provider?   Yes   Does the patient have an appointment with their PCP within 7 days of discharge?  Yes   Has the patient kept scheduled appointments due by today?  N/A   Has home health visited the patient within 72 hours of discharge?  N/A   Psychosocial issues?  No   Did the patient receive a copy of their discharge instructions?  Yes   Nursing interventions  Reviewed instructions with patient   What is the patient's perception of their health status since discharge?  Improving   Nursing interventions  Nurse provided patient education   Is the patient/caregiver able to teach back Sepsis?  S - Shivering,fever or very cold, P - Pale or discolored skin, S - Short of breath, S - Sleepy, difficult to arouse,confused   Nursing interventions  Nurse provided reassurance to patient   Is patient/caregiver able to teach back steps to recovery at home?  Set small, achievable goals for return to baseline health, Rest and regain strength, Talk about feelings with family/friends, Eat a balanced diet, Exercise as tolerated, Make a list of questions for PCP appoinment    Is the patient/caregiver able to teach back signs and symptoms of worsening condition:  Fever, Edema, Shortness of breath/rapid respiratory rate   If the patient is a current smoker, are they able to teach back resources for cessation?  Smoking cessation medications   Is the patient/caregiver able to teach back the hierarchy of who to call/visit for symptoms/problems? PCP, Specialist, Home health nurse, Urgent Care, ED, 911  Yes   Week 1 call completed?  Yes   Wrap up additional comments  She was tearful on the call. Still has stitches, gave # and address of surgeon for removal this week. Voiced complaints of no pain medication.          Babs Le RN

## 2021-01-12 ENCOUNTER — APPOINTMENT (OUTPATIENT)
Dept: INFUSION THERAPY | Facility: HOSPITAL | Age: 46
End: 2021-01-12

## 2021-01-13 ENCOUNTER — APPOINTMENT (OUTPATIENT)
Dept: INFUSION THERAPY | Facility: HOSPITAL | Age: 46
End: 2021-01-13

## 2021-01-14 ENCOUNTER — APPOINTMENT (OUTPATIENT)
Dept: INFUSION THERAPY | Facility: HOSPITAL | Age: 46
End: 2021-01-14

## 2021-01-15 ENCOUNTER — APPOINTMENT (OUTPATIENT)
Dept: INFUSION THERAPY | Facility: HOSPITAL | Age: 46
End: 2021-01-15

## 2021-01-16 ENCOUNTER — APPOINTMENT (OUTPATIENT)
Dept: INFUSION THERAPY | Facility: HOSPITAL | Age: 46
End: 2021-01-16

## 2021-01-17 ENCOUNTER — APPOINTMENT (OUTPATIENT)
Dept: INFUSION THERAPY | Facility: HOSPITAL | Age: 46
End: 2021-01-17

## 2021-01-18 ENCOUNTER — APPOINTMENT (OUTPATIENT)
Dept: INFUSION THERAPY | Facility: HOSPITAL | Age: 46
End: 2021-01-18

## 2021-01-19 ENCOUNTER — APPOINTMENT (OUTPATIENT)
Dept: INFUSION THERAPY | Facility: HOSPITAL | Age: 46
End: 2021-01-19

## 2021-01-20 ENCOUNTER — APPOINTMENT (OUTPATIENT)
Dept: INFUSION THERAPY | Facility: HOSPITAL | Age: 46
End: 2021-01-20

## 2021-01-20 ENCOUNTER — READMISSION MANAGEMENT (OUTPATIENT)
Dept: CALL CENTER | Facility: HOSPITAL | Age: 46
End: 2021-01-20

## 2021-01-20 NOTE — OUTREACH NOTE
Sepsis Week 2 Survey      Responses   [facility] patient discharged from?  [facility]   Does the patient have one of the following disease processes/diagnoses(primary or secondary)?  Sepsis   Week 2 attempt successful?  Yes   Call start time  1310   Call end time  1328   Medication alerts for this patient  Pt has PICC line receiving antibiotics daily.    Meds reviewed with patient/caregiver?  Yes   Is the patient taking all medications as directed (includes completed medication regime)?  Yes   Comments regarding appointments  Pt does go for dressing changes.   Has the patient kept scheduled appointments due by today?  Yes   Week 2 call completed?  Yes   Wrap up additional comments  Pt again very tearful stating shw was treated badly and has been accused of being IV drug user. Pt very angry. Pt still wanting more pain medication. Pt is receiving IV antibiotics daily.          Yuik Bar RN

## 2021-01-21 ENCOUNTER — APPOINTMENT (OUTPATIENT)
Dept: INFUSION THERAPY | Facility: HOSPITAL | Age: 46
End: 2021-01-21

## 2021-01-22 ENCOUNTER — APPOINTMENT (OUTPATIENT)
Dept: INFUSION THERAPY | Facility: HOSPITAL | Age: 46
End: 2021-01-22

## 2021-01-23 ENCOUNTER — APPOINTMENT (OUTPATIENT)
Dept: INFUSION THERAPY | Facility: HOSPITAL | Age: 46
End: 2021-01-23

## 2021-01-24 ENCOUNTER — APPOINTMENT (OUTPATIENT)
Dept: INFUSION THERAPY | Facility: HOSPITAL | Age: 46
End: 2021-01-24

## 2021-01-25 ENCOUNTER — APPOINTMENT (OUTPATIENT)
Dept: INFUSION THERAPY | Facility: HOSPITAL | Age: 46
End: 2021-01-25

## 2021-01-26 ENCOUNTER — READMISSION MANAGEMENT (OUTPATIENT)
Dept: CALL CENTER | Facility: HOSPITAL | Age: 46
End: 2021-01-26

## 2021-01-26 ENCOUNTER — APPOINTMENT (OUTPATIENT)
Dept: INFUSION THERAPY | Facility: HOSPITAL | Age: 46
End: 2021-01-26

## 2021-01-26 NOTE — OUTREACH NOTE
Sepsis Week 3 Survey      Responses   [facility] patient discharged from?  [facility]   Does the patient have one of the following disease processes/diagnoses(primary or secondary)?  Sepsis   Week 3 attempt successful?  Yes   Call start time  1841   Call end time  1843   Is the patient taking all medications as directed (includes completed medication regime)?  Yes   What is the patient's perception of their health status since discharge?  Improving   Is the patient/caregiver able to teach back the hierarchy of who to call/visit for symptoms/problems? PCP, Specialist, Home health nurse, Urgent Care, ED, 911  Yes   Additional teach back comments  Call was brief.  States she is trying to find a pain management doctor. Asked if she has a PCP and states no she is looking for one also.  Offered the [facility] # and pt hung up.   Revoked  No further contact(revokes)-requires comment          Hillary Sadler LPN

## 2021-01-27 ENCOUNTER — APPOINTMENT (OUTPATIENT)
Dept: INFUSION THERAPY | Facility: HOSPITAL | Age: 46
End: 2021-01-27

## 2021-01-28 ENCOUNTER — APPOINTMENT (OUTPATIENT)
Dept: INFUSION THERAPY | Facility: HOSPITAL | Age: 46
End: 2021-01-28

## 2021-01-29 ENCOUNTER — APPOINTMENT (OUTPATIENT)
Dept: INFUSION THERAPY | Facility: HOSPITAL | Age: 46
End: 2021-01-29

## 2021-01-30 ENCOUNTER — APPOINTMENT (OUTPATIENT)
Dept: INFUSION THERAPY | Facility: HOSPITAL | Age: 46
End: 2021-01-30

## 2021-01-31 ENCOUNTER — APPOINTMENT (OUTPATIENT)
Dept: INFUSION THERAPY | Facility: HOSPITAL | Age: 46
End: 2021-01-31

## 2021-02-01 ENCOUNTER — APPOINTMENT (OUTPATIENT)
Dept: INFUSION THERAPY | Facility: HOSPITAL | Age: 46
End: 2021-02-01

## 2021-02-02 ENCOUNTER — APPOINTMENT (OUTPATIENT)
Dept: INFUSION THERAPY | Facility: HOSPITAL | Age: 46
End: 2021-02-02

## 2021-02-03 ENCOUNTER — APPOINTMENT (OUTPATIENT)
Dept: INFUSION THERAPY | Facility: HOSPITAL | Age: 46
End: 2021-02-03

## 2021-02-04 ENCOUNTER — APPOINTMENT (OUTPATIENT)
Dept: INFUSION THERAPY | Facility: HOSPITAL | Age: 46
End: 2021-02-04

## 2021-02-05 ENCOUNTER — APPOINTMENT (OUTPATIENT)
Dept: INFUSION THERAPY | Facility: HOSPITAL | Age: 46
End: 2021-02-05

## 2021-02-06 ENCOUNTER — APPOINTMENT (OUTPATIENT)
Dept: INFUSION THERAPY | Facility: HOSPITAL | Age: 46
End: 2021-02-06

## 2021-02-07 ENCOUNTER — APPOINTMENT (OUTPATIENT)
Dept: INFUSION THERAPY | Facility: HOSPITAL | Age: 46
End: 2021-02-07

## 2021-02-08 ENCOUNTER — APPOINTMENT (OUTPATIENT)
Dept: INFUSION THERAPY | Facility: HOSPITAL | Age: 46
End: 2021-02-08

## 2021-02-09 ENCOUNTER — APPOINTMENT (OUTPATIENT)
Dept: INFUSION THERAPY | Facility: HOSPITAL | Age: 46
End: 2021-02-09

## 2022-11-03 ENCOUNTER — APPOINTMENT (OUTPATIENT)
Dept: CT IMAGING | Facility: HOSPITAL | Age: 47
End: 2022-11-03

## 2022-11-03 ENCOUNTER — HOSPITAL ENCOUNTER (OUTPATIENT)
Facility: HOSPITAL | Age: 47
Setting detail: OBSERVATION
Discharge: LEFT BEFORE DISCHARGE | End: 2022-11-04
Attending: EMERGENCY MEDICINE | Admitting: INTERNAL MEDICINE

## 2022-11-03 ENCOUNTER — APPOINTMENT (OUTPATIENT)
Dept: MRI IMAGING | Facility: HOSPITAL | Age: 47
End: 2022-11-03

## 2022-11-03 DIAGNOSIS — M54.9 BACK PAIN, UNSPECIFIED BACK LOCATION, UNSPECIFIED BACK PAIN LATERALITY, UNSPECIFIED CHRONICITY: Primary | ICD-10-CM

## 2022-11-03 DIAGNOSIS — M51.26 HERNIATED LUMBAR INTERVERTEBRAL DISC: ICD-10-CM

## 2022-11-03 LAB
ALBUMIN SERPL-MCNC: 4.3 G/DL (ref 3.5–5.2)
ALBUMIN/GLOB SERPL: 1.1 G/DL
ALP SERPL-CCNC: 150 U/L (ref 39–117)
ALT SERPL W P-5'-P-CCNC: 21 U/L (ref 1–33)
AMPHET+METHAMPHET UR QL: POSITIVE
ANION GAP SERPL CALCULATED.3IONS-SCNC: 12 MMOL/L (ref 5–15)
AST SERPL-CCNC: 20 U/L (ref 1–32)
BACTERIA UR QL AUTO: ABNORMAL /HPF
BARBITURATES UR QL SCN: NEGATIVE
BASOPHILS # BLD AUTO: 0.1 10*3/MM3 (ref 0–0.2)
BASOPHILS NFR BLD AUTO: 0.6 % (ref 0–1.5)
BENZODIAZ UR QL SCN: NEGATIVE
BILIRUB SERPL-MCNC: 0.8 MG/DL (ref 0–1.2)
BILIRUB UR QL STRIP: NEGATIVE
BUN SERPL-MCNC: 15 MG/DL (ref 6–20)
BUN/CREAT SERPL: 19.2 (ref 7–25)
CALCIUM SPEC-SCNC: 10 MG/DL (ref 8.6–10.5)
CANNABINOIDS SERPL QL: NEGATIVE
CHLORIDE SERPL-SCNC: 96 MMOL/L (ref 98–107)
CHOLEST SERPL-MCNC: 247 MG/DL (ref 0–200)
CLARITY UR: CLEAR
CO2 SERPL-SCNC: 28 MMOL/L (ref 22–29)
COCAINE UR QL: NEGATIVE
COLOR UR: YELLOW
CREAT SERPL-MCNC: 0.78 MG/DL (ref 0.57–1)
DEPRECATED RDW RBC AUTO: 52.5 FL (ref 37–54)
EGFRCR SERPLBLD CKD-EPI 2021: 95 ML/MIN/1.73
EOSINOPHIL # BLD AUTO: 0.4 10*3/MM3 (ref 0–0.4)
EOSINOPHIL NFR BLD AUTO: 3.6 % (ref 0.3–6.2)
ERYTHROCYTE [DISTWIDTH] IN BLOOD BY AUTOMATED COUNT: 15.1 % (ref 12.3–15.4)
GLOBULIN UR ELPH-MCNC: 4 GM/DL
GLUCOSE BLDC GLUCOMTR-MCNC: 144 MG/DL (ref 70–105)
GLUCOSE SERPL-MCNC: 157 MG/DL (ref 65–99)
GLUCOSE UR STRIP-MCNC: NEGATIVE MG/DL
HBA1C MFR BLD: 6.6 % (ref 3.5–5.6)
HCT VFR BLD AUTO: 49 % (ref 34–46.6)
HDLC SERPL-MCNC: 40 MG/DL (ref 40–60)
HGB BLD-MCNC: 16.5 G/DL (ref 12–15.9)
HGB UR QL STRIP.AUTO: NEGATIVE
HOLD SPECIMEN: NORMAL
HYALINE CASTS UR QL AUTO: ABNORMAL /LPF
KETONES UR QL STRIP: NEGATIVE
LDLC SERPL CALC-MCNC: 134 MG/DL (ref 0–100)
LDLC/HDLC SERPL: 3.16 {RATIO}
LEUKOCYTE ESTERASE UR QL STRIP.AUTO: ABNORMAL
LIPASE SERPL-CCNC: 41 U/L (ref 13–60)
LYMPHOCYTES # BLD AUTO: 1.8 10*3/MM3 (ref 0.7–3.1)
LYMPHOCYTES NFR BLD AUTO: 15.4 % (ref 19.6–45.3)
MCH RBC QN AUTO: 31.8 PG (ref 26.6–33)
MCHC RBC AUTO-ENTMCNC: 33.7 G/DL (ref 31.5–35.7)
MCV RBC AUTO: 94.3 FL (ref 79–97)
METHADONE UR QL SCN: NEGATIVE
MONOCYTES # BLD AUTO: 0.6 10*3/MM3 (ref 0.1–0.9)
MONOCYTES NFR BLD AUTO: 5.1 % (ref 5–12)
NEUTROPHILS NFR BLD AUTO: 75.3 % (ref 42.7–76)
NEUTROPHILS NFR BLD AUTO: 8.8 10*3/MM3 (ref 1.7–7)
NITRITE UR QL STRIP: NEGATIVE
NRBC BLD AUTO-RTO: 0.1 /100 WBC (ref 0–0.2)
OPIATES UR QL: NEGATIVE
OXYCODONE UR QL SCN: NEGATIVE
PH UR STRIP.AUTO: 6 [PH] (ref 5–8)
PLATELET # BLD AUTO: 350 10*3/MM3 (ref 140–450)
PMV BLD AUTO: 7.1 FL (ref 6–12)
POTASSIUM SERPL-SCNC: 4.1 MMOL/L (ref 3.5–5.2)
PROT SERPL-MCNC: 8.3 G/DL (ref 6–8.5)
PROT UR QL STRIP: NEGATIVE
RBC # BLD AUTO: 5.19 10*6/MM3 (ref 3.77–5.28)
RBC # UR STRIP: ABNORMAL /HPF
REF LAB TEST METHOD: ABNORMAL
SODIUM SERPL-SCNC: 136 MMOL/L (ref 136–145)
SP GR UR STRIP: 1.01 (ref 1–1.03)
SQUAMOUS #/AREA URNS HPF: ABNORMAL /HPF
TRIGL SERPL-MCNC: 404 MG/DL (ref 0–150)
TSH SERPL DL<=0.05 MIU/L-ACNC: 3 UIU/ML (ref 0.27–4.2)
UROBILINOGEN UR QL STRIP: ABNORMAL
VLDLC SERPL-MCNC: 73 MG/DL (ref 5–40)
WBC # UR STRIP: ABNORMAL /HPF
WBC NRBC COR # BLD: 11.7 10*3/MM3 (ref 3.4–10.8)
WHOLE BLOOD HOLD COAG: NORMAL

## 2022-11-03 PROCEDURE — 80307 DRUG TEST PRSMV CHEM ANLYZR: CPT | Performed by: EMERGENCY MEDICINE

## 2022-11-03 PROCEDURE — 25010000002 ENOXAPARIN PER 10 MG: Performed by: PHYSICIAN ASSISTANT

## 2022-11-03 PROCEDURE — G0378 HOSPITAL OBSERVATION PER HR: HCPCS

## 2022-11-03 PROCEDURE — 87086 URINE CULTURE/COLONY COUNT: CPT | Performed by: EMERGENCY MEDICINE

## 2022-11-03 PROCEDURE — 96374 THER/PROPH/DIAG INJ IV PUSH: CPT

## 2022-11-03 PROCEDURE — 25010000002 KETOROLAC TROMETHAMINE PER 15 MG: Performed by: EMERGENCY MEDICINE

## 2022-11-03 PROCEDURE — 36415 COLL VENOUS BLD VENIPUNCTURE: CPT | Performed by: PHYSICIAN ASSISTANT

## 2022-11-03 PROCEDURE — 83036 HEMOGLOBIN GLYCOSYLATED A1C: CPT | Performed by: PHYSICIAN ASSISTANT

## 2022-11-03 PROCEDURE — 74176 CT ABD & PELVIS W/O CONTRAST: CPT

## 2022-11-03 PROCEDURE — 72148 MRI LUMBAR SPINE W/O DYE: CPT

## 2022-11-03 PROCEDURE — 25010000002 ONDANSETRON PER 1 MG: Performed by: EMERGENCY MEDICINE

## 2022-11-03 PROCEDURE — 81001 URINALYSIS AUTO W/SCOPE: CPT | Performed by: EMERGENCY MEDICINE

## 2022-11-03 PROCEDURE — 80061 LIPID PANEL: CPT | Performed by: PHYSICIAN ASSISTANT

## 2022-11-03 PROCEDURE — 25010000002 LORAZEPAM PER 2 MG

## 2022-11-03 PROCEDURE — 99284 EMERGENCY DEPT VISIT MOD MDM: CPT

## 2022-11-03 PROCEDURE — 72146 MRI CHEST SPINE W/O DYE: CPT

## 2022-11-03 PROCEDURE — 25010000002 HYDROMORPHONE 1 MG/ML SOLUTION: Performed by: EMERGENCY MEDICINE

## 2022-11-03 PROCEDURE — 83690 ASSAY OF LIPASE: CPT | Performed by: EMERGENCY MEDICINE

## 2022-11-03 PROCEDURE — 96376 TX/PRO/DX INJ SAME DRUG ADON: CPT

## 2022-11-03 PROCEDURE — 82962 GLUCOSE BLOOD TEST: CPT

## 2022-11-03 PROCEDURE — 80050 GENERAL HEALTH PANEL: CPT | Performed by: EMERGENCY MEDICINE

## 2022-11-03 PROCEDURE — 87040 BLOOD CULTURE FOR BACTERIA: CPT | Performed by: PHYSICIAN ASSISTANT

## 2022-11-03 PROCEDURE — 96375 TX/PRO/DX INJ NEW DRUG ADDON: CPT

## 2022-11-03 PROCEDURE — 96372 THER/PROPH/DIAG INJ SC/IM: CPT

## 2022-11-03 PROCEDURE — 25010000002 KETOROLAC TROMETHAMINE PER 15 MG

## 2022-11-03 RX ORDER — DEXTROSE MONOHYDRATE 25 G/50ML
25 INJECTION, SOLUTION INTRAVENOUS
Status: DISCONTINUED | OUTPATIENT
Start: 2022-11-03 | End: 2022-11-04 | Stop reason: HOSPADM

## 2022-11-03 RX ORDER — POTASSIUM CHLORIDE 1.5 G/1.77G
40 POWDER, FOR SOLUTION ORAL AS NEEDED
Status: DISCONTINUED | OUTPATIENT
Start: 2022-11-03 | End: 2022-11-04 | Stop reason: HOSPADM

## 2022-11-03 RX ORDER — SODIUM CHLORIDE 0.9 % (FLUSH) 0.9 %
10 SYRINGE (ML) INJECTION EVERY 12 HOURS SCHEDULED
Status: DISCONTINUED | OUTPATIENT
Start: 2022-11-03 | End: 2022-11-04 | Stop reason: HOSPADM

## 2022-11-03 RX ORDER — KETOROLAC TROMETHAMINE 30 MG/ML
30 INJECTION, SOLUTION INTRAMUSCULAR; INTRAVENOUS EVERY 6 HOURS PRN
Status: DISCONTINUED | OUTPATIENT
Start: 2022-11-03 | End: 2022-11-04 | Stop reason: HOSPADM

## 2022-11-03 RX ORDER — NICOTINE POLACRILEX 4 MG
15 LOZENGE BUCCAL
Status: DISCONTINUED | OUTPATIENT
Start: 2022-11-03 | End: 2022-11-04 | Stop reason: HOSPADM

## 2022-11-03 RX ORDER — LORAZEPAM 2 MG/ML
INJECTION INTRAMUSCULAR
Status: COMPLETED
Start: 2022-11-03 | End: 2022-11-03

## 2022-11-03 RX ORDER — NITROGLYCERIN 0.4 MG/1
0.4 TABLET SUBLINGUAL
Status: DISCONTINUED | OUTPATIENT
Start: 2022-11-03 | End: 2022-11-04 | Stop reason: HOSPADM

## 2022-11-03 RX ORDER — CHOLECALCIFEROL (VITAMIN D3) 125 MCG
5 CAPSULE ORAL NIGHTLY PRN
Status: DISCONTINUED | OUTPATIENT
Start: 2022-11-03 | End: 2022-11-04 | Stop reason: HOSPADM

## 2022-11-03 RX ORDER — ONDANSETRON 2 MG/ML
4 INJECTION INTRAMUSCULAR; INTRAVENOUS EVERY 6 HOURS PRN
Status: DISCONTINUED | OUTPATIENT
Start: 2022-11-03 | End: 2022-11-04 | Stop reason: HOSPADM

## 2022-11-03 RX ORDER — ONDANSETRON 4 MG/1
4 TABLET, FILM COATED ORAL EVERY 6 HOURS PRN
Status: DISCONTINUED | OUTPATIENT
Start: 2022-11-03 | End: 2022-11-04 | Stop reason: HOSPADM

## 2022-11-03 RX ORDER — MAGNESIUM SULFATE HEPTAHYDRATE 40 MG/ML
2 INJECTION, SOLUTION INTRAVENOUS AS NEEDED
Status: DISCONTINUED | OUTPATIENT
Start: 2022-11-03 | End: 2022-11-04 | Stop reason: HOSPADM

## 2022-11-03 RX ORDER — INSULIN LISPRO 100 [IU]/ML
2-7 INJECTION, SOLUTION INTRAVENOUS; SUBCUTANEOUS
Status: DISCONTINUED | OUTPATIENT
Start: 2022-11-03 | End: 2022-11-04 | Stop reason: HOSPADM

## 2022-11-03 RX ORDER — ALUMINA, MAGNESIA, AND SIMETHICONE 2400; 2400; 240 MG/30ML; MG/30ML; MG/30ML
15 SUSPENSION ORAL EVERY 6 HOURS PRN
Status: DISCONTINUED | OUTPATIENT
Start: 2022-11-03 | End: 2022-11-04 | Stop reason: HOSPADM

## 2022-11-03 RX ORDER — HEPARIN SODIUM 5000 [USP'U]/ML
5000 INJECTION, SOLUTION INTRAVENOUS; SUBCUTANEOUS EVERY 12 HOURS
Status: DISCONTINUED | OUTPATIENT
Start: 2022-11-04 | End: 2022-11-04 | Stop reason: HOSPADM

## 2022-11-03 RX ORDER — LORAZEPAM 2 MG/ML
2 INJECTION INTRAMUSCULAR ONCE
Status: COMPLETED | OUTPATIENT
Start: 2022-11-03 | End: 2022-11-03

## 2022-11-03 RX ORDER — ONDANSETRON 2 MG/ML
4 INJECTION INTRAMUSCULAR; INTRAVENOUS ONCE
Status: COMPLETED | OUTPATIENT
Start: 2022-11-03 | End: 2022-11-03

## 2022-11-03 RX ORDER — AMOXICILLIN 250 MG
2 CAPSULE ORAL 2 TIMES DAILY
Status: DISCONTINUED | OUTPATIENT
Start: 2022-11-03 | End: 2022-11-04 | Stop reason: HOSPADM

## 2022-11-03 RX ORDER — ENOXAPARIN SODIUM 100 MG/ML
40 INJECTION SUBCUTANEOUS DAILY
Status: DISCONTINUED | OUTPATIENT
Start: 2022-11-03 | End: 2022-11-03

## 2022-11-03 RX ORDER — BISACODYL 10 MG
10 SUPPOSITORY, RECTAL RECTAL DAILY PRN
Status: DISCONTINUED | OUTPATIENT
Start: 2022-11-03 | End: 2022-11-04 | Stop reason: HOSPADM

## 2022-11-03 RX ORDER — POTASSIUM CHLORIDE 20 MEQ/1
40 TABLET, EXTENDED RELEASE ORAL AS NEEDED
Status: DISCONTINUED | OUTPATIENT
Start: 2022-11-03 | End: 2022-11-04 | Stop reason: HOSPADM

## 2022-11-03 RX ORDER — BISACODYL 5 MG/1
5 TABLET, DELAYED RELEASE ORAL DAILY PRN
Status: DISCONTINUED | OUTPATIENT
Start: 2022-11-03 | End: 2022-11-04 | Stop reason: HOSPADM

## 2022-11-03 RX ORDER — SODIUM CHLORIDE 0.9 % (FLUSH) 0.9 %
10 SYRINGE (ML) INJECTION AS NEEDED
Status: DISCONTINUED | OUTPATIENT
Start: 2022-11-03 | End: 2022-11-04 | Stop reason: HOSPADM

## 2022-11-03 RX ORDER — ACETAMINOPHEN 650 MG/1
650 SUPPOSITORY RECTAL EVERY 4 HOURS PRN
Status: DISCONTINUED | OUTPATIENT
Start: 2022-11-03 | End: 2022-11-04 | Stop reason: HOSPADM

## 2022-11-03 RX ORDER — ACETAMINOPHEN 325 MG/1
650 TABLET ORAL EVERY 4 HOURS PRN
Status: DISCONTINUED | OUTPATIENT
Start: 2022-11-03 | End: 2022-11-04 | Stop reason: HOSPADM

## 2022-11-03 RX ORDER — MAGNESIUM SULFATE 1 G/100ML
1 INJECTION INTRAVENOUS AS NEEDED
Status: DISCONTINUED | OUTPATIENT
Start: 2022-11-03 | End: 2022-11-04 | Stop reason: HOSPADM

## 2022-11-03 RX ORDER — POLYETHYLENE GLYCOL 3350 17 G/17G
17 POWDER, FOR SOLUTION ORAL DAILY PRN
Status: DISCONTINUED | OUTPATIENT
Start: 2022-11-03 | End: 2022-11-04 | Stop reason: HOSPADM

## 2022-11-03 RX ORDER — ONDANSETRON 2 MG/ML
4 INJECTION INTRAMUSCULAR; INTRAVENOUS ONCE
Status: DISCONTINUED | OUTPATIENT
Start: 2022-11-03 | End: 2022-11-03

## 2022-11-03 RX ORDER — KETOROLAC TROMETHAMINE 15 MG/ML
15 INJECTION, SOLUTION INTRAMUSCULAR; INTRAVENOUS ONCE
Status: COMPLETED | OUTPATIENT
Start: 2022-11-03 | End: 2022-11-03

## 2022-11-03 RX ORDER — OLANZAPINE 10 MG/2ML
1 INJECTION, POWDER, LYOPHILIZED, FOR SOLUTION INTRAMUSCULAR
Status: DISCONTINUED | OUTPATIENT
Start: 2022-11-03 | End: 2022-11-04 | Stop reason: HOSPADM

## 2022-11-03 RX ORDER — ACETAMINOPHEN 160 MG/5ML
650 SOLUTION ORAL EVERY 4 HOURS PRN
Status: DISCONTINUED | OUTPATIENT
Start: 2022-11-03 | End: 2022-11-04 | Stop reason: HOSPADM

## 2022-11-03 RX ADMIN — Medication 10 ML: at 21:29

## 2022-11-03 RX ADMIN — KETOROLAC TROMETHAMINE 30 MG: 30 INJECTION, SOLUTION INTRAMUSCULAR; INTRAVENOUS at 21:29

## 2022-11-03 RX ADMIN — HYDROMORPHONE HYDROCHLORIDE 0.5 MG: 1 INJECTION, SOLUTION INTRAMUSCULAR; INTRAVENOUS; SUBCUTANEOUS at 14:54

## 2022-11-03 RX ADMIN — ENOXAPARIN SODIUM 40 MG: 100 INJECTION SUBCUTANEOUS at 18:10

## 2022-11-03 RX ADMIN — ONDANSETRON 4 MG: 2 INJECTION INTRAMUSCULAR; INTRAVENOUS at 14:54

## 2022-11-03 RX ADMIN — LORAZEPAM 2 MG: 2 INJECTION INTRAMUSCULAR; INTRAVENOUS at 11:12

## 2022-11-03 RX ADMIN — LORAZEPAM 2 MG: 2 INJECTION INTRAMUSCULAR at 11:12

## 2022-11-03 RX ADMIN — KETOROLAC TROMETHAMINE 15 MG: 15 INJECTION, SOLUTION INTRAMUSCULAR; INTRAVENOUS at 09:14

## 2022-11-03 NOTE — ED PROVIDER NOTES
Subjective   History of Present Illness  Chief complaint: Patient is a pleasant 47-year-old.  She comes in with severe pain.  Its all across her low back.  She feels numbness in the front of her thighs.  She was working with the pegs a couple days ago.  She states that she noticed pain then but did not notice any overt injury.  She states that she has had a history of radiculopathy pain in the past.  She also states that she has not had a fever.  She does have a history in the chart of drug abuse and IV drug abuse.  She had a septic arthritis in the past.  But she states that she never did inject drugs.  She is not sure how she obtained the septic arthritis.  She states she has not been abusing any drugs recently.  No weakness in her legs.  No loss of bowel or bladder    Context: As above    Duration: 2 days    Timing: Progressive    Severity: Severe    Associated Symptoms: Negative step as noted above        PCP:  LMP: Hysterectomy        Review of Systems   Constitutional: Negative for fever.   HENT: Negative.    Respiratory: Negative for shortness of breath.    Gastrointestinal: Negative for abdominal pain.   Genitourinary: Positive for flank pain.   Musculoskeletal: Positive for back pain.   All other systems reviewed and are negative.      Past Medical History:   Diagnosis Date   • Allergic rhinitis 10/1/2015   • Anxiety 12/22/2020   • Cervical radiculopathy 12/22/2020   • Chronic low back pain 5/11/2015   • Depression 12/22/2020   • Drug abuse, IV (CMS/[facility]) 12/22/2020   • Hidradenitis suppurativa 9/11/2013   • Hyperlipidemia 5/11/2015   • Hypertension 12/22/2020   • Insomnia due to mental disorder 10/17/2014   • Restless leg syndrome 5/11/2015   • Tobacco use 5/11/2015   • Type 2 diabetes mellitus without complications (CMS/[facility]) 12/22/2020       Allergies   Allergen Reactions   • Rocephin [Ceftriaxone] Rash   • Penicillins Hives       Past Surgical History:   Procedure Laterality Date   • ARM DEBRIDEMENT Left  12/24/2020    Procedure: Arthrotomy of left shoulder with irrigation and decompression of trapezial abscess with irrigation and debridement;  Surgeon: Clayton Bergeron MD;  Location: [facility] MAIN OR;  Service: Orthopedics;  Laterality: Left;   • SHOULDER ACROMIOCLAVICULAR JOINT REPAIR Left 12/24/2020    Procedure: SHOULDER ACROMIOCLAVICULAR JOINT RESECTION;  Surgeon: Clayton Bergeron MD;  Location: [facility] MAIN OR;  Service: Orthopedics;  Laterality: Left;       Family History   Problem Relation Age of Onset   • Diabetes Mother    • Heart disease Mother    • COPD Father        Social History     Socioeconomic History   • Marital status:    Tobacco Use   • Smoking status: Every Day     Packs/day: 0.50     Types: Cigarettes   Substance and Sexual Activity   • Alcohol use: Not Currently   • Drug use: Yes     Types: IV, Methamphetamines   • Sexual activity: Never           Objective   Physical Exam  Vitals and nursing note reviewed.   Constitutional:       General: She is in acute distress.      Appearance: Normal appearance.   HENT:      Head: Normocephalic and atraumatic.   Eyes:      Extraocular Movements: Extraocular movements intact.      Pupils: Pupils are equal, round, and reactive to light.   Cardiovascular:      Rate and Rhythm: Normal rate and regular rhythm.      Pulses: Normal pulses.      Heart sounds: Normal heart sounds.   Pulmonary:      Effort: Pulmonary effort is normal.      Breath sounds: Normal breath sounds.   Abdominal:      Tenderness: There is no abdominal tenderness.   Musculoskeletal:         General: Normal range of motion.      Cervical back: Normal range of motion and neck supple.      Lumbar back: Tenderness and bony tenderness present.        Back:    Skin:     General: Skin is warm and dry.   Neurological:      General: No focal deficit present.      Mental Status: She is alert and oriented to person, place, and time.      Cranial Nerves: No cranial nerve deficit.       Sensory: No sensory deficit.      Motor: No weakness.      Coordination: Coordination normal.      Deep Tendon Reflexes: Reflexes normal.   Psychiatric:         Mood and Affect: Mood normal.         Thought Content: Thought content normal.         Procedures           ED Course  ED Course as of 11/03/22 1342   Thu Nov 03, 2022   1341 Delay in mri thoracic report [LH]      ED Course User Index  [LH] Harjeet Bart Mtz DO            Results for orders placed or performed during the hospital encounter of 11/03/22   Comprehensive Metabolic Panel    Specimen: Blood   Result Value Ref Range    Glucose 157 (H) 65 - 99 mg/dL    BUN 15 6 - 20 mg/dL    Creatinine 0.78 0.57 - 1.00 mg/dL    Sodium 136 136 - 145 mmol/L    Potassium 4.1 3.5 - 5.2 mmol/L    Chloride 96 (L) 98 - 107 mmol/L    CO2 28.0 22.0 - 29.0 mmol/L    Calcium 10.0 8.6 - 10.5 mg/dL    Total Protein 8.3 6.0 - 8.5 g/dL    Albumin 4.30 3.50 - 5.20 g/dL    ALT (SGPT) 21 1 - 33 U/L    AST (SGOT) 20 1 - 32 U/L    Alkaline Phosphatase 150 (H) 39 - 117 U/L    Total Bilirubin 0.8 0.0 - 1.2 mg/dL    Globulin 4.0 gm/dL    A/G Ratio 1.1 g/dL    BUN/Creatinine Ratio 19.2 7.0 - 25.0    Anion Gap 12.0 5.0 - 15.0 mmol/L    eGFR 95.0 >60.0 mL/min/1.73   Lipase    Specimen: Blood   Result Value Ref Range    Lipase 41 13 - 60 U/L   Urinalysis With Culture If Indicated - Urine, Clean Catch    Specimen: Urine, Clean Catch   Result Value Ref Range    Color, UA Yellow Yellow, Straw    Appearance, UA Clear Clear    pH, UA 6.0 5.0 - 8.0    Specific Gravity, UA 1.012 1.005 - 1.030    Glucose, UA Negative Negative    Ketones, UA Negative Negative    Bilirubin, UA Negative Negative    Blood, UA Negative Negative    Protein, UA Negative Negative    Leuk Esterase, UA Large (3+) (A) Negative    Nitrite, UA Negative Negative    Urobilinogen, UA 0.2 E.U./dL 0.2 - 1.0 E.U./dL   CBC Auto Differential    Specimen: Blood   Result Value Ref Range    WBC 11.70 (H) 3.40 - 10.80 10*3/mm3    RBC  5.19 3.77 - 5.28 10*6/mm3    Hemoglobin 16.5 (H) 12.0 - 15.9 g/dL    Hematocrit 49.0 (H) 34.0 - 46.6 %    MCV 94.3 79.0 - 97.0 fL    MCH 31.8 26.6 - 33.0 pg    MCHC 33.7 31.5 - 35.7 g/dL    RDW 15.1 12.3 - 15.4 %    RDW-SD 52.5 37.0 - 54.0 fl    MPV 7.1 6.0 - 12.0 fL    Platelets 350 140 - 450 10*3/mm3    Neutrophil % 75.3 42.7 - 76.0 %    Lymphocyte % 15.4 (L) 19.6 - 45.3 %    Monocyte % 5.1 5.0 - 12.0 %    Eosinophil % 3.6 0.3 - 6.2 %    Basophil % 0.6 0.0 - 1.5 %    Neutrophils, Absolute 8.80 (H) 1.70 - 7.00 10*3/mm3    Lymphocytes, Absolute 1.80 0.70 - 3.10 10*3/mm3    Monocytes, Absolute 0.60 0.10 - 0.90 10*3/mm3    Eosinophils, Absolute 0.40 0.00 - 0.40 10*3/mm3    Basophils, Absolute 0.10 0.00 - 0.20 10*3/mm3    nRBC 0.1 0.0 - 0.2 /100 WBC   Urine Drug Screen - Urine, Clean Catch    Specimen: Urine, Clean Catch   Result Value Ref Range    Amphet/Methamphet, Screen Positive (A) Negative    Barbiturates Screen, Urine Negative Negative    Benzodiazepine Screen, Urine Negative Negative    Cocaine Screen, Urine Negative Negative    Opiate Screen Negative Negative    THC, Screen, Urine Negative Negative    Methadone Screen, Urine Negative Negative    Oxycodone Screen, Urine Negative Negative   Urinalysis, Microscopic Only - Urine, Clean Catch    Specimen: Urine, Clean Catch   Result Value Ref Range    RBC, UA 0-2 (A) None Seen /HPF    WBC, UA 13-20 (A) None Seen /HPF    Bacteria, UA None Seen None Seen /HPF    Squamous Epithelial Cells, UA 3-6 (A) None Seen, 0-2 /HPF    Hyaline Casts, UA 3-6 None Seen /LPF    Methodology Automated Microscopy    Gold Top - SST   Result Value Ref Range    Extra Tube Hold for add-ons.    Light Blue Top   Result Value Ref Range    Extra Tube Hold for add-ons.            CT Abdomen Pelvis Without Contrast    Result Date: 11/3/2022   1. There is mild prominence of the right renal pelvis which may represent normal variation. Alternatively findings could represent a recently passed stone.  There is no evidence of current renal or ureteral calculi. 2. Diffuse hepatic steatosis 3. Other incidental findings as above   Electronically Signed By-Kobe Hunter On:11/3/2022 9:47 AM This report was finalized on [id number] by  Kobe Hunter, .    MRI Thoracic Spine Without Contrast    Result Date: 11/3/2022  1. No acute thoracic spine findings. No MR evidence of discitis/osteomyelitis. 2. Minor degenerative disc changes in the thoracic spine.  Electronically Signed By-Yany Farley MD On:11/3/2022 1:47 PM This report was finalized on [id number] by  Yany Farley MD.    MRI Lumbar Spine Without Contrast    Result Date: 11/3/2022  1. There is no evidence of discitis/osteomyelitis or epidural abscess on this noncontrast MR lumbar spine study. 2. L4-5 left paracentral disc extrusion with inferior migration, which may impinge upon the descending left L4 nerve root, with associated severe left lateral recess stenosis. 3. Moderate to severe bilateral neural foraminal stenosis L5-S1.. 4. Additional chronic, degenerative findings as described above.      Electronically Signed By-Yany Farley MD On:11/3/2022 12:17 PM This report was finalized on [id number] by  Yany Farley MD.                                 MDM  Number of Diagnoses or Management Options  Back pain, unspecified back location, unspecified back pain laterality, unspecified chronicity  Herniated lumbar intervertebral disc  Diagnosis management comments: Patient with severe worsening persistent back pain.  She does have a L4L5 central disc herniation.  Potentially causing her symptoms.  There is also history of septic arthritis in the past and drug use.  MRIs did not reveal any epidural abscess.  She still in severe pain.  She does have a mildly elevated white count.  However urine looks contaminated more than definitively infectious.  No signs of kidney stone on her CT scan.  Discussed with the hospitalist staff.  We will hold  antibiotics at this point and treat back pain and have neurosurgery consult on patient in the hospital.  Did discuss results with the patient.  She verbalizes understanding.       Amount and/or Complexity of Data Reviewed  Clinical lab tests: reviewed  Tests in the radiology section of CPT®: reviewed  Discussion of test results with the performing providers: yes  Discuss the patient with other providers: yes  Independent visualization of images, tracings, or specimens: yes    Patient Progress  Patient progress: stable      Final diagnoses:   None     Intractable back pain    L4-5 herniated disc  ED Disposition  ED Disposition     None          No follow-up provider specified.       Medication List      No changes were made to your prescriptions during this visit.          Bart Cosby,   11/03/22 4913

## 2022-11-03 NOTE — H&P
[facility] Medicine Services      Patient Name: Radha Curtis  : 1975  MRN: [id number]  Primary Care Physician:  Provider, No Known  Date of admission: 11/3/2022      Subjective      Chief Complaint: Back pain    History of Present Illness: Radha Curtis is a 47 y.o. female with a past medical history to include chronic low back pain, anxiety, hyperlipidemia, methamphetamine use hypertension and diabetes type 2 who presented to [facility] on 11/3/2022 complaining of low back for the last 2 to 3 days.  She reports she has a pet pig and was cleaning out the cage and her  noticed after that event she began complaining of low back pain.  She states that the pain level is a 10 out of 10.  Patient is not a reliable source of information, she is rolling around in bed calling out for her .  Urine drug test was positive for amphetamine, she denies intravenous use.  She states that her low back hurts feels like it stabbing and it radiates down both legs left worse than right.  She states that this is not happened before.  She denies having any lightheadedness, dizziness or syncopal episodes.  She denies any chest pains, shortness of breath.  She denies having any nausea or vomiting.  She denies having any abdominal pain, constipation or diarrhea.  She denies any issues with bowel or bladder.  She reports she can walk but is very painful.    Emergency room work-up vital signs stable.  Urine drug screen positive for methamphetamine.  UA negative.  Culture sensitivity pending.  Glucose 157.  Creatinine 0.78.  Potassium 4.1.  Chloride 96.  Alk phos 150.  Lipase 41.  White blood count 11.70.  Hemoglobin 16.5.  Platelets 350.     CT abdomen pelvis demonstrates there is mild prominence in the right renal pelvis which may represent normal variation.  Alternative findings could represent a recently passed stone.  There is no evidence of current renal or ureteral calculi.   Diffuse hepatic steatosis.    MRI lumbar There is no evidence of discitis/osteomyelitis or epidural abscess on   this noncontrast MR lumbar spine study. L4-5 left paracentral disc extrusion with inferior migration, which may impinge upon the descending left L4 nerve root, with associated   severe left lateral recess stenosis. Moderate to severe bilateral neural foraminal stenosis L5-S1..     MRI thoracic No acute thoracic spine findings. No MR evidence of   discitis/osteomyelitis. Minor degenerative disc changes in the thoracic spine.          Review of Systems   Constitutional: Negative.   HENT: Negative.    Eyes: Negative.    Cardiovascular: Negative.    Respiratory: Negative.    Endocrine: Negative.    Skin: Negative.    Musculoskeletal: Positive for back pain.   Gastrointestinal: Negative.    Genitourinary: Negative.    Neurological: Negative.    Psychiatric/Behavioral: Positive for substance abuse.          Personal History     Past Medical History:   Diagnosis Date   • Allergic rhinitis 10/1/2015   • Anxiety 12/22/2020   • Cervical radiculopathy 12/22/2020   • Chronic low back pain 5/11/2015   • Depression 12/22/2020   • Drug abuse, IV (CMS/[facility]) 12/22/2020   • Hidradenitis suppurativa 9/11/2013   • Hyperlipidemia 5/11/2015   • Hypertension 12/22/2020   • Insomnia due to mental disorder 10/17/2014   • Restless leg syndrome 5/11/2015   • Tobacco use 5/11/2015   • Type 2 diabetes mellitus without complications (CMS/[facility]) 12/22/2020       Past Surgical History:   Procedure Laterality Date   • ARM DEBRIDEMENT Left 12/24/2020    Procedure: Arthrotomy of left shoulder with irrigation and decompression of trapezial abscess with irrigation and debridement;  Surgeon: Clayton Bergeron MD;  Location: [facility] MAIN OR;  Service: Orthopedics;  Laterality: Left;   • SHOULDER ACROMIOCLAVICULAR JOINT REPAIR Left 12/24/2020    Procedure: SHOULDER ACROMIOCLAVICULAR JOINT RESECTION;  Surgeon: Clayton Bergeron MD;  Location:  [facility] MAIN OR;  Service: Orthopedics;  Laterality: Left;       Family History: family history includes COPD in her father; Diabetes in her mother; Heart disease in her mother. Otherwise pertinent FHx was reviewed and not pertinent to current issue.    Social History:  reports that she has been smoking cigarettes. She has been smoking an average of .5 packs per day. She does not have any smokeless tobacco history on file. She reports that she does not currently use alcohol. She reports current drug use. Drugs: IV and Methamphetamines.    Home Medications:  Prior to Admission Medications     None            Allergies:  Allergies   Allergen Reactions   • Rocephin [Ceftriaxone] Rash   • Penicillins Hives       Objective      Vitals:   Temp:  [97.8 °F (36.6 °C)] 97.8 °F (36.6 °C)  Heart Rate:  [] 103  Resp:  [18] 18  BP: (117-155)/(69-96) 117/84    Physical Exam  Constitutional:       General: She is not in acute distress.     Appearance: Normal appearance.   HENT:      Head: Normocephalic and atraumatic.   Cardiovascular:      Rate and Rhythm: Normal rate and regular rhythm.      Pulses: Normal pulses.      Heart sounds: Normal heart sounds.   Pulmonary:      Effort: Pulmonary effort is normal.      Breath sounds: Normal breath sounds.   Abdominal:      General: Bowel sounds are normal.      Palpations: Abdomen is soft.      Tenderness: There is abdominal tenderness.   Musculoskeletal:         General: Tenderness present.      Cervical back: Normal range of motion and neck supple.      Comments: Back pain, radiation down both legs   Skin:     General: Skin is warm and dry.   Neurological:      Mental Status: She is alert.            Result Review    Result Review:  I have personally reviewed the results from the time of this admission to 11/3/2022 17:07 EDT and agree with these findings:  [x]  Laboratory  [x]  Microbiology  [x]  Radiology  [x]  EKG/Telemetry   []  Cardiology/Vascular   []  Pathology  []  Old  records  []  Other:        Assessment & Plan        Active Hospital Problems:  Active Hospital Problems    Diagnosis    • **Back pain, unspecified back location, unspecified back pain laterality, unspecified chronicity      Plan:     Intractable low back pain   - MRI lumbar There is no evidence of discitis/osteomyelitis or epidural abscess on   this noncontrast MR lumbar spine study. L4-5 left paracentral disc extrusion with inferior migration, which may impinge upon the descending left L4 nerve root, with associated   severe left lateral recess stenosis. Moderate to severe bilateral neural foraminal stenosis L5-S1..   -MRI thoracic No acute thoracic spine findings. No MR evidence of   discitis/osteomyelitis. Minor degenerative disc changes in the thoracic spine.  - Spine consult     History of Drug Abuse/septic arthritis  - WBC 11.70  -Urine drug screen positive for methamphetamine  -blood cultures pending   -follow urine culture for findings CT abdomen pelvis demonstrates there is mild prominence in the right renal pelvis which may represent normal variation.  Alternative findings could represent a recently passed stone.  There is no evidence of current renal or ureteral calculi.  Diffuse hepatic steatosis  -Wait for abx on cultures     DM2  -Current glucose 157  -A1c  -Hold p.o. meds  -Sliding scale  -Check blood sugar before meals and at bedtime    Hypertension  -117/84  -No home meds  -Continue to monitor    Hyperlipidemia  -Check lipid panel  -Add statin if needed    DVT prophylaxis:  Medical DVT prophylaxis orders are present.    CODE STATUS:       Admission Status:  I believe this patient meets inpatient status.    I discussed the patient's findings and my recommendations with patient.        Signature: Electronically signed by Maryanne Jiménez PA-C, 11/03/22, 5:07 PM EDT.

## 2022-11-04 VITALS
DIASTOLIC BLOOD PRESSURE: 45 MMHG | TEMPERATURE: 97.8 F | BODY MASS INDEX: 31 KG/M2 | WEIGHT: 186.07 LBS | SYSTOLIC BLOOD PRESSURE: 99 MMHG | OXYGEN SATURATION: 95 % | HEART RATE: 98 BPM | HEIGHT: 65 IN | RESPIRATION RATE: 19 BRPM

## 2022-11-04 LAB
ANION GAP SERPL CALCULATED.3IONS-SCNC: 11 MMOL/L (ref 5–15)
BACTERIA SPEC AEROBE CULT: NORMAL
BASOPHILS # BLD AUTO: 0.1 10*3/MM3 (ref 0–0.2)
BASOPHILS NFR BLD AUTO: 1 % (ref 0–1.5)
BUN SERPL-MCNC: 25 MG/DL (ref 6–20)
BUN/CREAT SERPL: 35.7 (ref 7–25)
CALCIUM SPEC-SCNC: 10.1 MG/DL (ref 8.6–10.5)
CHLORIDE SERPL-SCNC: 97 MMOL/L (ref 98–107)
CO2 SERPL-SCNC: 27 MMOL/L (ref 22–29)
CREAT SERPL-MCNC: 0.7 MG/DL (ref 0.57–1)
DEPRECATED RDW RBC AUTO: 51.6 FL (ref 37–54)
EGFRCR SERPLBLD CKD-EPI 2021: 108.2 ML/MIN/1.73
EOSINOPHIL # BLD AUTO: 0.5 10*3/MM3 (ref 0–0.4)
EOSINOPHIL NFR BLD AUTO: 5.5 % (ref 0.3–6.2)
ERYTHROCYTE [DISTWIDTH] IN BLOOD BY AUTOMATED COUNT: 14.8 % (ref 12.3–15.4)
GLUCOSE BLDC GLUCOMTR-MCNC: 177 MG/DL (ref 70–105)
GLUCOSE BLDC GLUCOMTR-MCNC: 187 MG/DL (ref 70–105)
GLUCOSE SERPL-MCNC: 160 MG/DL (ref 65–99)
HCT VFR BLD AUTO: 47.8 % (ref 34–46.6)
HGB BLD-MCNC: 16 G/DL (ref 12–15.9)
LYMPHOCYTES # BLD AUTO: 2.4 10*3/MM3 (ref 0.7–3.1)
LYMPHOCYTES NFR BLD AUTO: 23.8 % (ref 19.6–45.3)
MAGNESIUM SERPL-MCNC: 2.2 MG/DL (ref 1.6–2.6)
MCH RBC QN AUTO: 31.8 PG (ref 26.6–33)
MCHC RBC AUTO-ENTMCNC: 33.5 G/DL (ref 31.5–35.7)
MCV RBC AUTO: 94.9 FL (ref 79–97)
MONOCYTES # BLD AUTO: 0.6 10*3/MM3 (ref 0.1–0.9)
MONOCYTES NFR BLD AUTO: 6.4 % (ref 5–12)
NEUTROPHILS NFR BLD AUTO: 6.4 10*3/MM3 (ref 1.7–7)
NEUTROPHILS NFR BLD AUTO: 63.3 % (ref 42.7–76)
NRBC BLD AUTO-RTO: 0.1 /100 WBC (ref 0–0.2)
PLATELET # BLD AUTO: 329 10*3/MM3 (ref 140–450)
PMV BLD AUTO: 7.3 FL (ref 6–12)
POTASSIUM SERPL-SCNC: 4.2 MMOL/L (ref 3.5–5.2)
RBC # BLD AUTO: 5.04 10*6/MM3 (ref 3.77–5.28)
SODIUM SERPL-SCNC: 135 MMOL/L (ref 136–145)
WBC NRBC COR # BLD: 10 10*3/MM3 (ref 3.4–10.8)

## 2022-11-04 PROCEDURE — G0378 HOSPITAL OBSERVATION PER HR: HCPCS

## 2022-11-04 PROCEDURE — 82962 GLUCOSE BLOOD TEST: CPT

## 2022-11-04 PROCEDURE — 83735 ASSAY OF MAGNESIUM: CPT | Performed by: PHYSICIAN ASSISTANT

## 2022-11-04 PROCEDURE — 63710000001 INSULIN LISPRO (HUMAN) PER 5 UNITS: Performed by: PHYSICIAN ASSISTANT

## 2022-11-04 PROCEDURE — 92610 EVALUATE SWALLOWING FUNCTION: CPT

## 2022-11-04 PROCEDURE — 25010000002 KETOROLAC TROMETHAMINE PER 15 MG

## 2022-11-04 PROCEDURE — 99204 OFFICE O/P NEW MOD 45 MIN: CPT

## 2022-11-04 PROCEDURE — 80048 BASIC METABOLIC PNL TOTAL CA: CPT | Performed by: PHYSICIAN ASSISTANT

## 2022-11-04 PROCEDURE — 85025 COMPLETE CBC W/AUTO DIFF WBC: CPT | Performed by: PHYSICIAN ASSISTANT

## 2022-11-04 PROCEDURE — 96376 TX/PRO/DX INJ SAME DRUG ADON: CPT

## 2022-11-04 RX ADMIN — INSULIN LISPRO 2 UNITS: 100 INJECTION, SOLUTION INTRAVENOUS; SUBCUTANEOUS at 09:42

## 2022-11-04 RX ADMIN — Medication 10 ML: at 07:11

## 2022-11-04 RX ADMIN — KETOROLAC TROMETHAMINE 30 MG: 30 INJECTION, SOLUTION INTRAMUSCULAR; INTRAVENOUS at 07:10

## 2022-11-04 RX ADMIN — Medication 10 ML: at 09:42

## 2022-11-04 NOTE — PROGRESS NOTES
Patient placed on my service 7 AM this morning.  Chart reviewed.  Several mentions of spine consult/neurosurgery consult by several providers but no consults were ordered by anyone.  Patient has not been seen by any specialist.  Will order neurosurgery consult for disc herniation and back pain.

## 2022-11-04 NOTE — CONSULTS
Neurosurgery Consultation      Patient: Radha Curtis    Sex: female    YOB: 1975    Date of Admission: 11/3/2022  8:39 AM    Admitting Dx: Acute pyelonephritis [N10]  Herniated lumbar intervertebral disc [M51.26]  Back pain, unspecified back location, unspecified back pain laterality, unspecified chronicity [M54.9]    Reason for Consult: Severe back pain and radicular symptoms      Subjective     HPI:  Patient is a 47 y.o. female with type 2 diabetes, IV drug abuse, and a history of septic arthritis who is being evaluated in the hospital for severe intractable back pain and radicular symptoms.  She describes back pain that radiates down the posterior aspect of her lower extremities and into her feet bilaterally.  This is associated with numbness and tingling.  This has been ongoing for 2 days and is not associated with any injury or inciting event.  Patient states she has not used drugs for at least 6 months, however her tox screen was positive for meth.  Patient denies urinary incontinence or retention and has no numbness in her inner thighs or groin.  She denies fever and chills or feeling sick recently.  Upon entering the room patient is laying down, crying, and restless, stating nobody will give her anything for pain.  Review of chart shows patient has been given Toradol.  Patient's speech and demeanor repeatedly shifts and calms throughout questioning.      PMH:  Past Medical History:   Diagnosis Date   • Allergic rhinitis 10/1/2015   • Anxiety 12/22/2020   • Cervical radiculopathy 12/22/2020   • Chronic low back pain 5/11/2015   • Depression 12/22/2020   • Drug abuse, IV ([facility]) 12/22/2020   • Hidradenitis suppurativa 9/11/2013   • Hyperlipidemia 5/11/2015   • Hypertension 12/22/2020   • Insomnia due to mental disorder 10/17/2014   • Restless leg syndrome 5/11/2015   • Tobacco use 5/11/2015   • Type 2 diabetes mellitus without complications ([facility]) 12/22/2020         Current  Facility-Administered Medications:   •  acetaminophen (TYLENOL) tablet 650 mg, 650 mg, Oral, Q4H PRN **OR** acetaminophen (TYLENOL) 160 MG/5ML solution 650 mg, 650 mg, Oral, Q4H PRN **OR** acetaminophen (TYLENOL) suppository 650 mg, 650 mg, Rectal, Q4H PRN, Maryanne Jiménez PA-C  •  aluminum-magnesium hydroxide-simethicone (MAALOX MAX) 400-400-40 MG/5ML suspension 15 mL, 15 mL, Oral, Q6H PRN, Maryanne Jiménez PA-C  •  sennosides-docusate (PERICOLACE) 8.6-50 MG per tablet 2 tablet, 2 tablet, Oral, BID **AND** polyethylene glycol (MIRALAX) packet 17 g, 17 g, Oral, Daily PRN **AND** bisacodyl (DULCOLAX) EC tablet 5 mg, 5 mg, Oral, Daily PRN **AND** bisacodyl (DULCOLAX) suppository 10 mg, 10 mg, Rectal, Daily PRN, Maryanne Jiménez PA-C  •  dextrose (D50W) (25 g/50 mL) IV injection 25 g, 25 g, Intravenous, Q15 Min PRN, Maryanne Jiménez PA-C  •  dextrose (GLUTOSE) oral gel 15 g, 15 g, Oral, Q15 Min PRN, Maryanne Jiménez PA-C  •  glucagon (human recombinant) (GLUCAGEN DIAGNOSTIC) 1 mg in sterile water (preservative free) 1 mL injection, 1 mg, Intramuscular, Q15 Min PRN, Maryanne Jiménez PA-C  •  heparin (porcine) 5000 UNIT/ML injection 5,000 Units, 5,000 Units, Subcutaneous, Q12H, Shiela Gutierrez APRN  •  insulin lispro (ADMELOG) injection 2-7 Units, 2-7 Units, Subcutaneous, TID With Meals, Maryanne Jiménez PA-C, 2 Units at 11/04/22 0942  •  ketorolac (TORADOL) injection 30 mg, 30 mg, Intravenous, Q6H PRN, Shiela Gutierrez, APRN, 30 mg at 11/04/22 0710  •  Magnesium Sulfate 2 gram infusion - Mg less than or equal to 1.5 mg/dL, 2 g, Intravenous, PRN **OR** Magnesium Sulfate 1 gram infusion - Mg 1.6-1.9 mg/dL, 1 g, Intravenous, PRN, Maryanne Jiménez PA-C  •  melatonin tablet 5 mg, 5 mg, Oral, Nightly PRN, Maryanne Jiménez PA-C  •  nitroglycerin (NITROSTAT) SL tablet 0.4 mg, 0.4 mg, Sublingual, Q5 Min PRN, Maryanne Jiménez PA-C  •  ondansetron (ZOFRAN) tablet 4 mg, 4 mg, Oral, Q6H PRN **OR** ondansetron (ZOFRAN) injection 4  mg, 4 mg, Intravenous, Q6H PRN, Maryanne Jiménez PA-C  •  potassium chloride (K-DUR,KLOR-CON) CR tablet 40 mEq, 40 mEq, Oral, PRN, Maryanne Jiménez PA-C  •  potassium chloride (KLOR-CON) packet 40 mEq, 40 mEq, Oral, PRN, Maryanne Jiménez PA-C  •  [COMPLETED] Insert peripheral IV, , , Once **AND** sodium chloride 0.9 % flush 10 mL, 10 mL, Intravenous, PRN, Hottman, Bart Mtz, DO, 10 mL at 11/04/22 0711  •  sodium chloride 0.9 % flush 10 mL, 10 mL, Intravenous, Q12H, Maryanne Jiménez PA-C, 10 mL at 11/04/22 0942  •  sodium chloride 0.9 % flush 10 mL, 10 mL, Intravenous, PRN, Maryanne Jiménez PA-C    Allergies   Allergen Reactions   • Rocephin [Ceftriaxone] Rash   • Penicillins Hives       Past Surgical History:   Procedure Laterality Date   • ARM DEBRIDEMENT Left 12/24/2020    Procedure: Arthrotomy of left shoulder with irrigation and decompression of trapezial abscess with irrigation and debridement;  Surgeon: Clayton Bergeron MD;  Location: [facility];  Service: Orthopedics;  Laterality: Left;   • SHOULDER ACROMIOCLAVICULAR JOINT REPAIR Left 12/24/2020    Procedure: SHOULDER ACROMIOCLAVICULAR JOINT RESECTION;  Surgeon: Clayton Bergeron MD;  Location: [facility];  Service: Orthopedics;  Laterality: Left;       Social History     Socioeconomic History   • Marital status:    Tobacco Use   • Smoking status: Every Day     Packs/day: 0.50     Years: 30.00     Pack years: 15.00     Types: Cigarettes   • Smokeless tobacco: Never   Vaping Use   • Vaping Use: Never used   Substance and Sexual Activity   • Alcohol use: Not Currently   • Drug use: Yes     Types: IV, Methamphetamines     Comment: Patient denies any substance abuse in her history   • Sexual activity: Yes     Partners: Male     Birth control/protection: Hysterectomy       Family History   Problem Relation Age of Onset   • Diabetes Mother    • Heart disease Mother    • COPD Father          Current Medications:  Scheduled Meds:heparin  (porcine), 5,000 Units, Subcutaneous, Q12H  insulin lispro, 2-7 Units, Subcutaneous, TID With Meals  senna-docusate sodium, 2 tablet, Oral, BID  sodium chloride, 10 mL, Intravenous, Q12H      Continuous Infusions:   PRN Meds: •  acetaminophen **OR** acetaminophen **OR** acetaminophen  •  aluminum-magnesium hydroxide-simethicone  •  senna-docusate sodium **AND** polyethylene glycol **AND** bisacodyl **AND** bisacodyl  •  dextrose  •  dextrose  •  glucagon (human recombinant)  •  ketorolac  •  magnesium sulfate **OR** magnesium sulfate in D5W 1g/100mL (PREMIX)  •  melatonin  •  nitroglycerin  •  ondansetron **OR** ondansetron  •  potassium chloride  •  potassium chloride  •  [COMPLETED] Insert peripheral IV **AND** sodium chloride  •  sodium chloride      14 point review of systems was completed and is negative except for what is noted in HPI      Objective     Vitals:    11/03/22 2033 11/04/22 0405 11/04/22 0700 11/04/22 1200   BP: 133/84 136/96 131/80 99/45   BP Location: Left arm Left arm Left arm Left arm   Patient Position: Lying Lying Lying Lying   Pulse: 101 105 104 98   Resp: 18 12 18 19   Temp: 98.6 °F (37 °C) 97.6 °F (36.4 °C) 98 °F (36.7 °C) 97.8 °F (36.6 °C)   TempSrc: Oral Oral Oral Oral   SpO2: 94% 95% 98% 95%   Weight:       Height:           Physical exam    General  -  awake, cooperative, restless and uncomfortable laying in bed  HEENT  - Normocephalic, atraumatic, PERRLA, EOM intact  Cardiac  - RRR, no peripheral edema  Respiratory  - Normal respiratory rate and effort  Abdomen  - Soft, NT/ND  Musculoskeletal  - No joint redness, swelling, or tenderness  Skin  - Warm and dry, no bleeding, bruising, or rash  NEUROLOGIC  - A/O x3  - Moves all extremities symmetrically with 5/5 strength  - Normal sensation throughout        RESULTS REVIEW:  Results from last 7 days   Lab Units 11/04/22  0322 11/03/22  0914   WBC 10*3/mm3 10.00 11.70*   HEMOGLOBIN g/dL 16.0* 16.5*   HEMATOCRIT % 47.8* 49.0*   PLATELETS  10*3/mm3 329 350        Results from last 7 days   Lab Units 11/04/22  0322 11/03/22  0914   SODIUM mmol/L 135* 136   POTASSIUM mmol/L 4.2 4.1   CHLORIDE mmol/L 97* 96*   CO2 mmol/L 27.0 28.0   BUN mg/dL 25* 15   CREATININE mg/dL 0.70 0.78   CALCIUM mg/dL 10.1 10.0   BILIRUBIN mg/dL  --  0.8   ALK PHOS U/L  --  150*   ALT (SGPT) U/L  --  21   AST (SGOT) U/L  --  20   GLUCOSE mg/dL 160* 157*              MRI Thoracic Spine Without Contrast    Result Date: 11/3/2022  MRI THORACIC SPINE WO CONTRAST-  Date of Exam: 11/3/2022 11:00 AM  Indication: back pain history of iv drug abusee  Comparison: MRI of the lumbar spine and CT abdomen pelvis 11/3/2022.  Technique: Multiplanar multisequence images of the thoracic spine were performed according to routine thoracic spine MRI protocol.  FINDINGS:   The thoracic vertebral bodies demonstrate normal height, normal alignment and normal marrow signal intensity. There is moderate diminished disc height at the T11-12 level with desiccation. There is minor endplate degenerative marrow changes and anterior osteophyte formation at T11-12. There is no evidence of discitis/osteomyelitis. No epidural fluid collection or abscess is seen, on this noncontrast examination. The included paraspinal soft tissues are within normal limits.  Minor disc bulges are present at the T4-5, T9-T10, and T11-12 levels, barely indenting the thecal sac. There is no significant thoracic spinal canal stenosis. No thoracic neural foraminal stenosis is seen.  The thoracic spinal cord maintains normal caliber and normal signal intensity.        Impression: 1. No acute thoracic spine findings. No MR evidence of discitis/osteomyelitis. 2. Minor degenerative disc changes in the thoracic spine.  Electronically Signed By-Yany Farley MD On:11/3/2022 1:47 PM This report was finalized on [id number] by  Yany Farley MD.    MRI Lumbar Spine Without Contrast    Result Date: 11/3/2022  MRI LUMBAR SPINE WO CONTRAST-   Date of Exam: 11/3/2022 10:30 AM  Indication: back pain history iv drug abuse  Comparison: CT abdomen and pelvis without contrast. Bone windows 11/3/2022.  Technique: Multiplanar multisequence images of the lumbar spine were performed according to routine lumbar spine MRI protocol.  FINDINGS:  The patient was unable to complete the entire examination, due to pain. Many of the images are degraded by motion. Some of the images were repeated.  The thoracic vertebral bodies demonstrate normal height and alignment. There is moderate diminished disc height at L4-5 and L5-S1. Type II Modic endplate degenerative changes are present at L4-5, and to lesser degree, L5-S1.  There is no evidence of significant disc edema or endplate edema. There is no MR evidence of discitis or osteomyelitis. No epidural fluid collection or abscess is appreciated. The imaged paraspinal soft tissues are within normal limits.  At T12-L1, there is minor disc bulge barely indenting the thecal sac. There is no significant canal or foraminal stenosis  At L1-2, there is no significant disc bulge, canal or foraminal stenosis.  At L2-3, there is mild disc bulge eccentric to the left subarticular region. There is borderline to mild canal stenosis. There is mild left neural foraminal stenosis. Right neural foramen is patent. Mild facet degenerative changes are noted.  At L3-4, there is a disc bulge eccentric to the left subarticular region. There is a superimposed left paracentral disc extrusion with 8 mm inferior migration with respect to the superior L4 endplate. The disc. Extrusion measures approximately 7 mm in AP by 10 mm transverse. This results in severe left lateral recess stenosis, mild to moderate central canal stenosis, and may impinge upon the descending left L4 nerve root. Mild to moderate facet arthropathy is noted, particularly on the right. There is mild bilateral neural foraminal stenosis.  At L4-5, mild concentric disc bulge is present.  There is mild left greater the right facet arthropathy. There is no significant canal stenosis. There is mild bilateral inferior neural foraminal narrowing.  At L5-S1, mild to moderate facet degenerative changes are present, greatest on the left. There is mild disc bulge which does not appear to impinge upon the thecal sac. There is moderate to severe bilateral neural foraminal stenosis.        Impression: 1. There is no evidence of discitis/osteomyelitis or epidural abscess on this noncontrast MR lumbar spine study. 2. L4-5 left paracentral disc extrusion with inferior migration, which may impinge upon the descending left L4 nerve root, with associated severe left lateral recess stenosis. 3. Moderate to severe bilateral neural foraminal stenosis L5-S1.. 4. Additional chronic, degenerative findings as described above.      Electronically Signed By-Yany Farley MD On:11/3/2022 12:17 PM This report was finalized on [id number] by  Yany Farley MD.                Assessment     MDM: This is a 47 y.o. female with obesity, type 2 diabetes, drug abuse, and a history of septic arthritis being seen in the hospital for acute back pain and radiculopathy.  She describes bilateral radicular symptoms along an L5 or S1 distribution but has no symptoms of neurogenic claudication and no red flags for cauda equina syndrome.  MRI shows severe left lateral recess stenosis at L4-5 and moderate to severe neuroforaminal stenosis at L5-S1. Given patient's lack of emergent signs or symptoms, she does not require surgical intervention at this time.  I would recommend patient undergo outpatient physical therapy and follow-up in our clinic if she continues to have pain.  Recommend conservative management while in the hospital with medications such as muscle relaxers and/or steroids.  Recommend PT evaluation.  If patient's pain is difficult to control it may be worth having pain management evaluate her for possible L5-S1 HARRY for acute  relief so that she can meaningfully engage with outpatient physical therapy.        Plan     1. Acute lumbar radiculopathy  - No emergent neurosurgical intervention necessary at this time  - Pain control per primary team  - Could consider pain management evaluation for possible L5-S1 HARRY  - May follow-up in neurosurgery clinic once she has completed outpatient physical therapy    2. Intractable low back pain  - Consider NSAIDs, muscle relaxers, and/or steroids for pain; will defer to primary team for management  - Recommend PT evaluation while in hospital    Neurosurgery will sign off at this time but will be available for any questions or concerns        I discussed my assessment and recommendations with Dr. Milligan and the nursing staff      Paolo Mirza PA-C  11/4/2022  13:22 EDT      Part of this note may be an electronic transcription/translation of spoken language to printed text using the Dragon Dictation System.

## 2022-11-04 NOTE — PLAN OF CARE
Goal Outcome Evaluation:      Patient was seen for evaluation to assess swallowing functions and to determine least restrictive diet. Patient is currently on regular diet. Patient complains of difficulty swallowing as dentures are too big. Patient is on a consistent carb diet but noted family or friend had brought in a large cup of chocolate milk. Our dietary department limits her chocolate milk to a small cup on her tray. Suspect patient is likely not complying with dietary restriction. Patient is edentulous  as she states she does not wear dentures because dentures are ill fitted. Oral mechanism examination revealed patient exhibits adequate ROM and mobility of lingual and labial structures. Patient agreed to elevate near 90 degrees for evaluation as she was experiencing back pain. Propped patient with 4 pillows behind her for proper positioning during meal. Patient took several small bites of food that was on her tray which included biscuit and gravy and scrambled eggs. Patient displayed a munch/chew pattern. Patient was able to clear oral cavity effectively between bites. The few bites she would agree to eat she managed. It is likely that she tends to order soft solids d/t no teeth. No s/s of aspiration were evident.  Patient took sips of thins via straw without any overt s/s of aspiration. No wet vocal quality was detected. Patient did not desire eating much. It is recommended that patient receive a mechanical ground diet at this time. ST will follow to assure safety and tolerance with recommended diet.

## 2022-11-04 NOTE — DISCHARGE SUMMARY
[facility] Medicine Services  AGAINST MEDICAL ADVICE discharge SUMMARY    Patient Name: Radha Curtis  : 1975  MRN: [id number]    Date of Admission: 11/3/2022  Discharge Diagnosis: Intractable back pain  Date of leaving AGAINST MEDICAL ADVICE: 2022  Primary Care Physician: Provider, No Known      Presenting Problem:   Acute pyelonephritis [N10]  Herniated lumbar intervertebral disc [M51.26]  Back pain, unspecified back location, unspecified back pain laterality, unspecified chronicity [M54.9]    Active and Resolved Hospital Problems:  Active Hospital Problems    Diagnosis POA   • **Back pain, unspecified back location, unspecified back pain laterality, unspecified chronicity [M54.9] Yes      Resolved Hospital Problems   No resolved problems to display.         Hospital Course     Hospital Course:  Radha Curtis is a 47 y.o. female who came to the hospital with severe back pain.  Imaging of the spine revealed that she had herniated disks.  Neurosurgery was consulted.  They recommended conservative measures first to see if this would help prior to doing any kind of surgery.  After neurosurgery evaluation the patient became angry and demanded higher dosages of narcotics.  Before the patient could be seen for the first time she stated she was leaving AGAINST MEDICAL ADVICE.  The patient was counseled and signed paperwork understanding all the  risks of leaving      22      Signature: Leopoldo Hubbard MD

## 2022-11-04 NOTE — CASE MANAGEMENT/SOCIAL WORK
Discharge Planning Assessment  [facility]     Patient Name: Radha Curtis  MRN: [id number]  Today's Date: 11/4/2022    Admit Date: 11/3/2022        Substance Abuse     Row Name 11/04/22 1328       Family Member Substance Use (#4)    Substance Use Comments SW spoke with Pt and significant other at bedside with Pt's permission. SW discussed that Pt has the ability to setup a Primary Care Provider by calling the number on the back of her insurance card. Pt shared that her card is at home. Pt's significant other stated he can bring the card to [facility]. SW spoke with Pt about positive UDS for amphet/methamphetamine. Pt declined use. Pt was bent in fetal position and began moaning in pain. She stated her pain is in her back. She added that MD talked to her about her options for pain medication and RN is aware of her need for pain management. SW informed Pt that RN was most likely getting the pain medication ready. SW shared information with staff at the Nurse's station.              Annabel Raines, MARYW, LSW, APHSW-C  Medical Social Worker  [facility]  [address] [place] [address]  Office: [phone]  Fax: [phone]

## 2022-11-04 NOTE — SIGNIFICANT NOTE
Case Management Discharge Note      Final Note: (P) pt left AMA         Selected Continued Care - Discharged on 11/4/2022 Admission date: 11/3/2022 - Discharge disposition: Left Against Medical Advice                  Final Discharge Disposition Code: (P) 07 - left AMA

## 2022-11-04 NOTE — NURSING NOTE
Pt has been c/o of back pain. Toradol given. Patient has been crying out she states pain medication that was received hasn't been effective. Neurosurgeon spoke with patient about steroid injection through pain management. Patient refused intervention. Patient demanded to leave AMA. MD notified.

## 2022-11-04 NOTE — THERAPY EVALUATION
Acute Care - Speech Language Pathology   Swallow Initial Evaluation  [facility]     Patient Name: Radha Curtis  : 1975  MRN: [id number]  Today's Date: 2022               Admit Date: 11/3/2022  Patient was not wearing a face mask during this therapy encounter. Therapist used appropriate personal protective equipment including mask, eye protection and gloves.  Mask used was standard procedure mask. Appropriate PPE was worn during the entire therapy session. Hand hygiene was completed before and after therapy session. Patient is not in enhanced droplet precautions.             Visit Dx:     ICD-10-CM ICD-9-CM   1. Back pain, unspecified back location, unspecified back pain laterality, unspecified chronicity  M54.9 724.5   2. Herniated lumbar intervertebral disc  M51.26 722.10     Patient Active Problem List   Diagnosis   • Severe sepsis ([facility])   • Allergic rhinitis   • Anxiety   • Cervical radiculopathy   • Chronic low back pain   • Depression   • Hidradenitis suppurativa   • Hyperlipidemia   • Hypertension   • Insomnia due to mental disorder   • Restless leg syndrome   • Tobacco use   • Type 2 diabetes mellitus without complications ([facility])   • Drug abuse, IV ([facility])   • Septic arthritis of AC joint ([facility])   • MRSA bacteremia   • S/P debridement   • MSSA bacteremia   • Congestive heart failure ([facility])   • Pneumonia due to infectious organism   • Morbidly obese ([facility])   • Back pain, unspecified back location, unspecified back pain laterality, unspecified chronicity     Past Medical History:   Diagnosis Date   • Allergic rhinitis 10/1/2015   • Anxiety 2020   • Cervical radiculopathy 2020   • Chronic low back pain 2015   • Depression 2020   • Drug abuse, IV ([facility]) 2020   • Hidradenitis suppurativa 2013   • Hyperlipidemia 2015   • Hypertension 2020   • Insomnia due to mental disorder 10/17/2014   • Restless leg syndrome 2015   • Tobacco use 2015   • Type 2  diabetes mellitus without complications ([facility]) 12/22/2020     Past Surgical History:   Procedure Laterality Date   • ARM DEBRIDEMENT Left 12/24/2020    Procedure: Arthrotomy of left shoulder with irrigation and decompression of trapezial abscess with irrigation and debridement;  Surgeon: Clayton Bergeron MD;  Location: [facility];  Service: Orthopedics;  Laterality: Left;   • SHOULDER ACROMIOCLAVICULAR JOINT REPAIR Left 12/24/2020    Procedure: SHOULDER ACROMIOCLAVICULAR JOINT RESECTION;  Surgeon: Clayton Bergeron MD;  Location: [facility];  Service: Orthopedics;  Laterality: Left;       SLP Recommendation and Plan  SLP Swallowing Diagnosis: oral dysphagia (11/04/22 1500)  SLP Diet Recommendation: ground, thin liquids (11/04/22 1500)  Recommended Precautions and Strategies: upright posture during/after eating, small bites of food and sips of liquid (11/04/22 1500)  SLP Rec. for Method of Medication Administration: meds whole, meds crushed, as tolerated (11/04/22 1500)     Monitor for Signs of Aspiration: yes, notify SLP if any concerns (11/04/22 1500)     Swallow Criteria for Skilled Therapeutic Interventions Met: demonstrates skilled criteria (11/04/22 1500)     Rehab Potential/Prognosis, Swallowing: good, to achieve stated therapy goals (11/04/22 1500)  Therapy Frequency (Swallow): PRN (11/04/22 1500)  Predicted Duration Therapy Intervention (Days): until discharge (11/04/22 1500)       SWALLOW EVALUATION (last 72 hours)     SLP Adult Swallow Evaluation     Row Name 11/04/22 1500          Document Type evaluation  -CB    Subjective Information no complaints  -CB    Patient Observations alert;poorly cooperative  -CB    Patient/Family/Caregiver Comments/Observations Patient was c/o back pain and would only sit up for a short bit before she would fail to continue with assessment any further.  -CB    Patient Effort adequate  -CB          Patient Profile Reviewed yes  -CB    Pertinent History Of Current  Problem Radha Curtis is a 47 y.o. female with a past medical history to include chronic low back pain, anxiety, hyperlipidemia, methamphetamine use hypertension and diabetes type 2 who presented [facility] on [facility]2022 complaining of low back for the last 2 to 3 days.  She reports she has a pet pig and was cleaning out the cage and her  noticed after that event she began complaining of low back pain.  She states that the pain level is a 10 out of 10.  Patient is not a reliable source of information, she is rolling around in bed calling out for her .  Urine drug test was positive for amphetamine, she denies intravenous use.  She states that her low back hurts feels like it stabbing and it radiates down both legs left worse than right.  She states that this is not happened before.  She denies having any lightheadedness, dizziness or syncopal episodes.  She denies any chest pains, shortness of breath.  She denies having any nausea or vomiting.  She denies having any abdominal pain, constipation or diarrhea.  She denies any issues with bowel or bladder.  She reports she can walk but is very painful.MRI lumbar There is no evidence of discitis/osteomyelitis or epidural abscess on   this noncontrast MR lumbar spine study. L4-5 left paracentral disc extrusion with inferior migration, which may impinge upon the descending left L4 nerve root, with associated   severe left lateral recess stenosis. Moderate to severe bilateral neural foraminal stenosis L5-S1  -CB    Current Method of Nutrition regular textures  -CB          Pain: FACES Scale, Pretreatment 6-->hurts even more  -CB    Posttreatment Pain Rating 6-->hurts even more  -CB    Pre/Posttreatment Pain Comment Nursing aware of pain.  -CB          Dentition Assessment edentulous  -CB    Secretion Management WNL/WFL  -CB    Mucosal Quality moist, healthy  -CB          Oral Motor General Assessment WFL  -CB    Oral Motor, Comment Oral  mechanism examination revealed adequate ROM and mobility of lingual and labial structures.  -CB          Respiratory Support Currently in Use room air  -CB    Eating/Swallowing Skills fed by SLP  -CB    Positioning During Eating upright in bed  -CB    Utensils Used spoon;straw  -CB    Consistencies Trialed soft textures;thin liquids  -CB          Clinical Swallow Evaluation Summary Patient was seen for evaluation to assess swallowing functions and to determine least restrictive diet. Patient is currently on regular diet. Patient complains of difficulty swallowing as dentures are too big. Patient is on a consistent carb diet but noted family or friend had brought in a large cup of chocolate milk. Our dietary department limits her chocolate milk to a small cup on her tray. Suspect patient is likely not complying with dietary restriction. Patient is edentulous  as she states she does not wear dentures because dentures are ill fitted. Oral mechanism examination revealed patient exhibits adequate ROM and mobility of lingual and labial structures. Patient agreed to elevate near 90 degrees for evaluation as she was experiencing back pain. Propped patient with 4 pillows behind her for proper positioning during meal. Patient took several small bites of food that was on her tray which included biscuit and gravy and scrambled eggs. Patient displayed a munch/chew pattern. Patient was able to clear oral cavity effectively between bites. The few bites she would agree to eat she managed. It is likely that she tends to order soft solids d/t no teeth. No s/s of aspiration were evident.  Patient took sips of thins via straw without any overt s/s of aspiration. No wet vocal quality was detected. Patient did not desire eating much. It is recommended that patient receive a mechanical ground diet at this time. ST will follow to assure safety and tolerance with recommended diet.  -CB          SLP Swallowing Diagnosis oral dysphagia  -CB     Functional Impact risk of aspiration/pneumonia  -CB    Rehab Potential/Prognosis, Swallowing good, to achieve stated therapy goals  -CB    Swallow Criteria for Skilled Therapeutic Interventions Met demonstrates skilled criteria  -CB          Therapy Frequency (Swallow) PRN  -CB    Predicted Duration Therapy Intervention (Days) until discharge  -CB    SLP Diet Recommendation ground;thin liquids  -CB    Recommended Precautions and Strategies upright posture during/after eating;small bites of food and sips of liquid  -CB    Oral Care Recommendations Oral Care BID/PRN;Swab  -CB    SLP Rec. for Method of Medication Administration meds whole;meds crushed;as tolerated  -CB    Monitor for Signs of Aspiration yes;notify SLP if any concerns  -CB          Swallow LTGs Swallow Long Term Goal (free text)  -CB    Swallow STGs diet tolerance goal selection (SLP)  -CB    Diet Tolerance Goal Selection (SLP) Swallow Short Term Goal 1  -CB          (LTG) Swallow Patient will tolerate safest and least restrictive diet without complications from aspiration.  -CB    Time Frame (Swallow Long Term Goal) by discharge  -CB          (STG) Swallow 1 Patient will participate in meal assessment  within 72 hours to assure safety and toleranace of recommended diet.  -CB    Time Frame (Swallow Short Term Goal 1) 1 week  -CB          User Key  (r) = Recorded By, (t) = Taken By, (c) = Cosigned By    Initials Name Effective Dates    Kimberly Kraft, SLP 09/21/21 -                 EDUCATION  The patient has been educated in the following areas:   Dysphagia (Swallowing Impairment) Oral Care/Hydration.        SLP GOALS     Row Name 11/04/22 1500          (LTG) Swallow Patient will tolerate safest and least restrictive diet without complications from aspiration.  -CB    Time Frame (Swallow Long Term Goal) by discharge  -CB          (STG) Swallow 1 Patient will participate in meal assessment  within 72 hours to assure safety and toleranace of  recommended diet.  -CB    Time Frame (Swallow Short Term Goal 1) 1 week  -CB          User Key  (r) = Recorded By, (t) = Taken By, (c) = Cosigned By    Initials Name Provider Type    Kimberly Kraft, SLP Speech and Language Pathologist                   Time Calculation:                NATHAN Barriga  11/4/2022

## 2022-11-04 NOTE — PAYOR COMM NOTE
"PATIENT WAS CHANGED TO OBSERVATION STATUS. PLEASE CANCEL THE INPATIENT CASE WITH PENDING AUTH # [id number]. THANKS          Radha Curtis (47 y.o. Female) 1975      11/04/22 0902  Initiate Observation Status  Once     Completed     Level of Care: Med/Surg    Diagnosis: Back pain, unspecified back location, unspecified back pain laterality, unspecified chronicity [6410108]    Admitting Physician: CHRISTIANO CHAPMAN [[id number]]    Attending Physician: BRANDON HUBBARD [[id number]]                        Lindsay Patton, RN MSN  /UR  [facility]  [phone] office  [phone] fax  [email or web address]    [facility]  NPI: [id number]  Tax: [phone]            Radha Curtis (47 y.o. Female)     Date of Birth   1975    Social Security Number       Address   [address] [place] [address] [place] [facility]    Home Phone   [phone]    MRN   [id number]       Islam   None    Marital Status                               Admission Date   11/3/22    Admission Type   Emergency    Admitting Provider   Christiano Chapman MD    Attending Provider   Brandon Hubbard MD    Department, Room/Bed   [facility] 2A PEDIATRICS, 205/1       Discharge Date       Discharge Disposition       Discharge Destination                               Attending Provider: Brandon Hubbard MD    Allergies: Rocephin [Ceftriaxone], Penicillins    Isolation: None   Infection: None   Code Status: Prior    Ht: 165.1 cm (65\")   Wt: 84.4 kg (186 lb 1.1 oz)    Admission Cmt: None   Principal Problem: Back pain, unspecified back location, unspecified back pain laterality, unspecified chronicity [M54.9]                 Active Insurance as of 11/3/2022     Primary Coverage     Payor Plan Insurance Group Employer/Plan Group    [facility] [id number]      Payor Plan Address Payor Plan Phone Number Payor Plan Fax Number Effective Dates    [address]   12/1/2020 - None " Entered    [facility] [id number]       Subscriber Name Subscriber Birth Date Member ID       PJ SANDOVAL 1975 [id number]                 Emergency Contacts      (Rel.) Home Phone Work Phone Mobile Phone    Aristeo Sandoval (Spouse) [phone] -- [phone]

## 2022-11-04 NOTE — CASE MANAGEMENT/SOCIAL WORK
Continued Stay Note  MATIAS White     Patient Name: Radha Curtis  MRN: [id number]  Today's Date: 11/4/2022    Admit Date: 11/3/2022    Plan: Home with spouse.   Discharge Plan     Row Name 11/04/22 6786       Plan    Plan Home with spouse.    Patient/Family in Agreement with Plan yes    Plan Comments Met with pt at bedside, she is IADLs and lives with her spouse.  Pt doesn't drive, her spouse does and will provide transportation at d/c.  Denies need of DME.  Pt is without a PCP.  MSW instructed patient to call the number on the back of her Medicaid card to set up new PCP.  Pharmacy verified.  Denies trouble affording home medications.    Row Name 11/04/22 1536       Plan    Plan Comments --                    Expected Discharge Date and Time     Expected Discharge Date Expected Discharge Time    Nov 4, 2022             Jackelin Peace RN

## 2022-11-08 LAB
BACTERIA SPEC AEROBE CULT: NORMAL
BACTERIA SPEC AEROBE CULT: NORMAL

## (undated) DEVICE — TBG IRRI TUR Y/TYP NONVENT 98IN LF

## (undated) DEVICE — OCCLUSIVE GAUZE STRIP OVERWRAP,3% BISMUTH TRIBROMOPHENATE IN PETROLATUM BLEND: Brand: XEROFORM

## (undated) DEVICE — 3M™ IOBAN™ 2 ANTIMICROBIAL INCISE DRAPE 6650EZ: Brand: IOBAN™ 2

## (undated) DEVICE — DRSNG WND GZ PAD BORDERED 4X8IN STRL

## (undated) DEVICE — SUT PROLN 2/0 CT2 30IN 8411H

## (undated) DEVICE — SPNG GZ AVANT 6PLY 4X4IN STRL PK/2

## (undated) DEVICE — SOL IRRIG H2O 1000ML STRL

## (undated) DEVICE — SUT PDS 0 CT 36IN DYED Z358T

## (undated) DEVICE — DRAPE,SHOULDER,BEACH CHAIR,STERILE: Brand: MEDLINE

## (undated) DEVICE — KT SURG TURNOVER 050

## (undated) DEVICE — SLV SCD CALF HEMOFORCE DVT THERP REPROC MD

## (undated) DEVICE — SOL IRR NACL 0.9PCT ARTHROMATIC 3000ML

## (undated) DEVICE — APPL CHLORAPREP HI/LITE 26ML ORNG

## (undated) DEVICE — SUT MNCRYL 3/0 PS2 18IN Y497G

## (undated) DEVICE — 3M™ STERI-DRAPE™ U-DRAPE 1015: Brand: STERI-DRAPE™

## (undated) DEVICE — DECANTER: Brand: UNBRANDED

## (undated) DEVICE — KT HEMOVAC INFCT CONTRL DRN 3/16IN EA/10KT

## (undated) DEVICE — CONTAINER,SPECIMEN,OR STERILE,4OZ: Brand: MEDLINE

## (undated) DEVICE — PK EXTREM 50

## (undated) DEVICE — PROXIMATE RH ROTATING HEAD SKIN STAPLERS (35 WIDE) CONTAINS 35 STAINLESS STEEL STAPLES: Brand: PROXIMATE

## (undated) DEVICE — SUT SILK 2/0 FS BLK 18IN 685G

## (undated) DEVICE — DRSNG WND BORDR/ADHS NONADHR/GZ LF 4X4IN STRL

## (undated) DEVICE — Device